# Patient Record
Sex: FEMALE | Race: WHITE | NOT HISPANIC OR LATINO | Employment: OTHER | URBAN - METROPOLITAN AREA
[De-identification: names, ages, dates, MRNs, and addresses within clinical notes are randomized per-mention and may not be internally consistent; named-entity substitution may affect disease eponyms.]

---

## 2023-09-07 LAB
ALANINE AMINOTRANSFERASE (SGPT) (U/L) IN SER/PLAS: 16 U/L (ref 7–45)
ALBUMIN (G/DL) IN SER/PLAS: 4.3 G/DL (ref 3.4–5)
ALKALINE PHOSPHATASE (U/L) IN SER/PLAS: 71 U/L (ref 33–136)
ANION GAP IN SER/PLAS: 12 MMOL/L (ref 10–20)
ASPARTATE AMINOTRANSFERASE (SGOT) (U/L) IN SER/PLAS: 18 U/L (ref 9–39)
BASOPHILS (10*3/UL) IN BLOOD BY AUTOMATED COUNT: 0.04 X10E9/L (ref 0–0.1)
BASOPHILS/100 LEUKOCYTES IN BLOOD BY AUTOMATED COUNT: 0.5 % (ref 0–2)
BILIRUBIN TOTAL (MG/DL) IN SER/PLAS: 1.3 MG/DL (ref 0–1.2)
CALCIUM (MG/DL) IN SER/PLAS: 9.3 MG/DL (ref 8.6–10.6)
CARBON DIOXIDE, TOTAL (MMOL/L) IN SER/PLAS: 28 MMOL/L (ref 21–32)
CHLORIDE (MMOL/L) IN SER/PLAS: 104 MMOL/L (ref 98–107)
COBALAMIN (VITAMIN B12) (PG/ML) IN SER/PLAS: 291 PG/ML (ref 211–911)
CREATININE (MG/DL) IN SER/PLAS: 0.78 MG/DL (ref 0.5–1.05)
EOSINOPHILS (10*3/UL) IN BLOOD BY AUTOMATED COUNT: 0.02 X10E9/L (ref 0–0.7)
EOSINOPHILS/100 LEUKOCYTES IN BLOOD BY AUTOMATED COUNT: 0.3 % (ref 0–6)
ERYTHROCYTE DISTRIBUTION WIDTH (RATIO) BY AUTOMATED COUNT: 14.4 % (ref 11.5–14.5)
ERYTHROCYTE MEAN CORPUSCULAR HEMOGLOBIN CONCENTRATION (G/DL) BY AUTOMATED: 30.5 G/DL (ref 32–36)
ERYTHROCYTE MEAN CORPUSCULAR VOLUME (FL) BY AUTOMATED COUNT: 98 FL (ref 80–100)
ERYTHROCYTES (10*6/UL) IN BLOOD BY AUTOMATED COUNT: 4.01 X10E12/L (ref 4–5.2)
GFR FEMALE: 81 ML/MIN/1.73M2
GLUCOSE (MG/DL) IN SER/PLAS: 105 MG/DL (ref 74–99)
HEMATOCRIT (%) IN BLOOD BY AUTOMATED COUNT: 39.4 % (ref 36–46)
HEMOGLOBIN (G/DL) IN BLOOD: 12 G/DL (ref 12–16)
IMMATURE GRANULOCYTES/100 LEUKOCYTES IN BLOOD BY AUTOMATED COUNT: 0.4 % (ref 0–0.9)
LEUKOCYTES (10*3/UL) IN BLOOD BY AUTOMATED COUNT: 7.5 X10E9/L (ref 4.4–11.3)
LYMPHOCYTES (10*3/UL) IN BLOOD BY AUTOMATED COUNT: 0.75 X10E9/L (ref 1.2–4.8)
LYMPHOCYTES/100 LEUKOCYTES IN BLOOD BY AUTOMATED COUNT: 9.9 % (ref 13–44)
MONOCYTES (10*3/UL) IN BLOOD BY AUTOMATED COUNT: 0.26 X10E9/L (ref 0.1–1)
MONOCYTES/100 LEUKOCYTES IN BLOOD BY AUTOMATED COUNT: 3.4 % (ref 2–10)
NEUTROPHILS (10*3/UL) IN BLOOD BY AUTOMATED COUNT: 6.44 X10E9/L (ref 1.2–7.7)
NEUTROPHILS/100 LEUKOCYTES IN BLOOD BY AUTOMATED COUNT: 85.5 % (ref 40–80)
NRBC (PER 100 WBCS) BY AUTOMATED COUNT: 0 /100 WBC (ref 0–0)
PLATELETS (10*3/UL) IN BLOOD AUTOMATED COUNT: 333 X10E9/L (ref 150–450)
POTASSIUM (MMOL/L) IN SER/PLAS: 3.7 MMOL/L (ref 3.5–5.3)
PROTEIN TOTAL: 6.9 G/DL (ref 6.4–8.2)
SODIUM (MMOL/L) IN SER/PLAS: 140 MMOL/L (ref 136–145)
UREA NITROGEN (MG/DL) IN SER/PLAS: 12 MG/DL (ref 6–23)

## 2023-09-12 LAB — METHYLMALONIC ACID, S: 0.13 UMOL/L (ref 0–0.4)

## 2023-10-10 ENCOUNTER — TELEPHONE (OUTPATIENT)
Dept: NEUROLOGY | Facility: HOSPITAL | Age: 68
End: 2023-10-10

## 2023-10-10 NOTE — TELEPHONE ENCOUNTER
Pt called stating that she had her last infusion on 9/19 at home.  Pt did not receive her IVIG meds from [facility] which was supposed to be at home last week.

## 2023-10-15 PROBLEM — I63.9 STROKE (MULTI): Status: ACTIVE | Noted: 2023-10-15

## 2023-10-15 PROBLEM — G70.00 MYASTHENIA (MULTI): Status: ACTIVE | Noted: 2023-10-15

## 2023-10-15 PROBLEM — I72.9 ANEURYSM (CMS-HCC): Status: ACTIVE | Noted: 2023-10-15

## 2023-10-15 PROBLEM — K21.9 ACID REFLUX: Status: ACTIVE | Noted: 2023-10-15

## 2023-10-15 PROBLEM — H53.2 DOUBLE VISION: Status: ACTIVE | Noted: 2023-10-15

## 2023-10-15 PROBLEM — R25.2 MUSCLE CRAMPS: Status: ACTIVE | Noted: 2023-10-15

## 2023-10-15 PROBLEM — G70.01: Status: ACTIVE | Noted: 2023-10-15

## 2023-10-15 PROBLEM — R53.83 FATIGUE: Status: ACTIVE | Noted: 2023-10-15

## 2023-10-15 PROBLEM — F41.9 ANXIETY: Status: ACTIVE | Noted: 2023-10-15

## 2023-10-15 PROBLEM — G47.00 INSOMNIA: Status: ACTIVE | Noted: 2023-10-15

## 2023-10-15 PROBLEM — M62.81 MUSCLE WEAKNESS: Status: ACTIVE | Noted: 2023-10-15

## 2023-10-15 RX ORDER — PYRIDOSTIGMINE BROMIDE 180 MG/1
1 TABLET, EXTENDED RELEASE ORAL NIGHTLY
COMMUNITY
End: 2024-06-03 | Stop reason: SDUPTHER

## 2023-10-15 RX ORDER — PANTOPRAZOLE SODIUM 40 MG/1
1 TABLET, DELAYED RELEASE ORAL DAILY
Status: ON HOLD | COMMUNITY
Start: 2022-01-19

## 2023-10-15 RX ORDER — PREDNISONE 5 MG/1
3 TABLET ORAL DAILY
COMMUNITY
Start: 2023-09-28 | End: 2023-12-27 | Stop reason: SDUPTHER

## 2023-10-15 RX ORDER — DIPHENHYDRAMINE HYDROCHLORIDE 50 MG/ML
50 INJECTION INTRAMUSCULAR; INTRAVENOUS
Status: ON HOLD | COMMUNITY
Start: 2023-01-09

## 2023-10-15 RX ORDER — SODIUM CHLORIDE 9 MG/ML
INJECTION, SOLUTION INTRAVENOUS
Status: ON HOLD | COMMUNITY
Start: 2023-03-30

## 2023-10-15 RX ORDER — UBIDECARENONE 75 MG
1 CAPSULE ORAL DAILY
Status: ON HOLD | COMMUNITY
Start: 2021-01-26

## 2023-10-15 RX ORDER — FOLIC ACID 1 MG/1
1 TABLET ORAL DAILY
COMMUNITY
Start: 2023-09-28 | End: 2023-11-08 | Stop reason: SDUPTHER

## 2023-10-15 RX ORDER — HUMAN IMMUNOGLOBULIN G 0.2 G/ML
LIQUID SUBCUTANEOUS
COMMUNITY
Start: 2023-06-27 | End: 2023-10-17

## 2023-10-15 RX ORDER — CEPHALEXIN 500 MG/1
1 CAPSULE ORAL 3 TIMES DAILY
Status: ON HOLD | COMMUNITY
Start: 2023-07-06

## 2023-10-15 RX ORDER — FERROUS SULFATE 325(65) MG
1 TABLET ORAL DAILY
COMMUNITY
Start: 2021-01-26 | End: 2024-03-18 | Stop reason: WASHOUT

## 2023-10-15 RX ORDER — PREDNISONE 10 MG/1
1 TABLET ORAL DAILY
Status: ON HOLD | COMMUNITY

## 2023-10-15 RX ORDER — METHOTREXATE 2.5 MG/1
5 TABLET ORAL
COMMUNITY
Start: 2023-10-02 | End: 2023-10-17 | Stop reason: SDUPTHER

## 2023-10-15 RX ORDER — FLUCONAZOLE 100 MG/1
TABLET ORAL
Status: ON HOLD | COMMUNITY
Start: 2022-11-15

## 2023-10-15 RX ORDER — CYCLOSPORINE 25 MG/1
1 CAPSULE, GELATIN COATED ORAL 2 TIMES DAILY
COMMUNITY
Start: 2023-06-05 | End: 2023-10-17

## 2023-10-15 RX ORDER — EPINEPHRINE 1 MG/ML
1 INJECTION INTRAMUSCULAR; INTRAVENOUS; SUBCUTANEOUS
Status: ON HOLD | COMMUNITY
Start: 2023-01-09

## 2023-10-15 RX ORDER — GABAPENTIN 100 MG/1
CAPSULE ORAL
COMMUNITY
Start: 2023-09-22 | End: 2023-12-27 | Stop reason: SDUPTHER

## 2023-10-15 RX ORDER — MUPIROCIN 20 MG/G
1 OINTMENT TOPICAL
Status: ON HOLD | COMMUNITY
Start: 2023-07-06

## 2023-10-15 RX ORDER — CYCLOSPORINE 50 MG/1
1 CAPSULE, LIQUID FILLED ORAL 2 TIMES DAILY
COMMUNITY
Start: 2023-05-18 | End: 2023-10-17

## 2023-10-15 RX ORDER — PYRIDOSTIGMINE BROMIDE 60 MG/1
1 TABLET ORAL 4 TIMES DAILY
Status: ON HOLD | COMMUNITY

## 2023-10-15 RX ORDER — DULOXETIN HYDROCHLORIDE 60 MG/1
1 CAPSULE, DELAYED RELEASE ORAL DAILY
Status: ON HOLD | COMMUNITY

## 2023-10-17 ENCOUNTER — OFFICE VISIT (OUTPATIENT)
Dept: NEUROLOGY | Facility: HOSPITAL | Age: 68
End: 2023-10-17
Payer: COMMERCIAL

## 2023-10-17 VITALS
DIASTOLIC BLOOD PRESSURE: 78 MMHG | TEMPERATURE: 96.8 F | HEART RATE: 69 BPM | RESPIRATION RATE: 18 BRPM | BODY MASS INDEX: 24.27 KG/M2 | SYSTOLIC BLOOD PRESSURE: 130 MMHG | HEIGHT: 63 IN | WEIGHT: 137 LBS

## 2023-10-17 DIAGNOSIS — G70.00 MYASTHENIA GRAVIS (MULTI): Primary | ICD-10-CM

## 2023-10-17 PROCEDURE — 99213 OFFICE O/P EST LOW 20 MIN: CPT | Performed by: PSYCHIATRY & NEUROLOGY

## 2023-10-17 PROCEDURE — 1036F TOBACCO NON-USER: CPT | Performed by: PSYCHIATRY & NEUROLOGY

## 2023-10-17 PROCEDURE — 1126F AMNT PAIN NOTED NONE PRSNT: CPT | Performed by: PSYCHIATRY & NEUROLOGY

## 2023-10-17 PROCEDURE — 99213 OFFICE O/P EST LOW 20 MIN: CPT | Mod: GC | Performed by: PSYCHIATRY & NEUROLOGY

## 2023-10-17 RX ORDER — METHOTREXATE 2.5 MG/1
17.5 TABLET ORAL
Qty: 175 TABLET | Refills: 0 | Status: SHIPPED | OUTPATIENT
Start: 2023-10-17 | End: 2024-03-18 | Stop reason: SDUPTHER

## 2023-10-17 ASSESSMENT — ENCOUNTER SYMPTOMS
OCCASIONAL FEELINGS OF UNSTEADINESS: 1
LOSS OF SENSATION IN FEET: 1

## 2023-10-17 ASSESSMENT — PAIN SCALES - GENERAL: PAINLEVEL: 0-NO PAIN

## 2023-10-17 NOTE — PATIENT INSTRUCTIONS
Great to see you in clinic today!  We'll plan to continue the IVIG every 3 weeks and see how you do.   For now, we'll increase the methotrexate to 17.5mg every week.   Keep supplementing with B12 daily and folate daily.   Next visit, we will talk about going down on the prednisone. For now, keep at 15mg daily.     Please give us a call if you need any refills.

## 2023-10-17 NOTE — PROGRESS NOTES
Date of Service: 10/17/2023  Patient: Page Huston  MRN: [id number]  Primary Care Physician: Annamarie Dickinson MD     History of Present Illness:    Ms. PAGE HUSTON is a 68 year woman who presents to neuromuscular clinic for follow-up of her seronegative myasthenia gravis (negative for both AChRab and MuskAb), diagnosed ~2017 on single fiber EMG. CT chest negative for thymoma.     She was and has been in a state of mild exacerbation of her MG for several months, with the following symptoms: generalized fatigue, dyspnea on exertion, frequent bothersome diplopia, mildly worsening dysphagia. While she had benefitted from immunoglobulin treatment in the past, she missed several treatments since June/July 2023 due to high out-of-pocket copay costs with home IVIG, and even higher costs when switched to subcutaneous Ig.     She was last seen in mid-Sept 2023.   Interval History:   Interval history:  -Overall, the co-pay issue has been resolved, she received a madison from [employer], which will help to cover her co-pay through next year.  - She was able to get one-time treatment of IVIG before her trip to [place] in late September 2023.  - There was some delay with receiving her IVIG delivered to her home, so her most recent home IVIG infusion through her port was on 10/12 and 10/13  -Her trip to [place] to visit her son went fairly well, but she does not tolerate warm weather well, and experienced frequent cramping every upper extremities and lower extremities, especially in her hands when she tries to use them.  -She notes continued double vision, blurry vision, symptoms seem to be worse at night, makes driving particularly difficult.  Saw her eye doctor who essentially told her her exam was normal at the time of examination.  -Some trouble with swallowing, food gets briefly stuck.  Had an episode with choking related to eating corn.  Also notes some weakness in her neck, more tasking with keeping her neck up  through the day.  Has noticed her voice sounds more hoarse.  -She continues to take prednisone 15 mg daily  -She continues the methotrexate 15 mg once a week, and reports she is supplementing with B12 and folate  -She reports taking Mestinon every 3-4 hours, about 4 tablets daily, really does feel it works and helps with an achiness and fatigue that she feels in her muscles, particularly when she is due for her next dose.    Allergies   Allergen Reactions    Prednisolone Unknown        Medications:    Current Outpatient Medications:     0.9 % sodium chloride (sodium chloride 0.9%) solution, As directed., Disp: , Rfl:     Adrenalin 1 mg/mL (1 mL) injection, 1 mL (1 mg). As directed., Disp: , Rfl:     cephalexin (Keflex) 500 mg capsule, Take 1 capsule (500 mg) by mouth 3 times a day., Disp: , Rfl:     cyanocobalamin (Vitamin B-12) 500 mcg tablet, Take 1 tablet (500 mcg) by mouth once daily., Disp: , Rfl:     diphenhydrAMINE (BENADryl) 50 mg/mL injection, 1 mL (50 mg). As directed., Disp: , Rfl:     DULoxetine (Cymbalta) 60 mg DR capsule, Take 1 capsule (60 mg) by mouth once daily., Disp: , Rfl:     ferrous sulfate 325 (65 Fe) MG tablet, Take 1 tablet (325 mg) by mouth once daily. With food, Disp: , Rfl:     fluconazole (Diflucan) 100 mg tablet, Take 2 tablets by mouth today and then 1 tablet every morninig for 14 days, Disp: , Rfl:     folic acid (Folvite) 1 mg tablet, Take 1 tablet (1 mg) by mouth once daily., Disp: , Rfl:     gabapentin (Neurontin) 100 mg capsule, TAKE ONE CAPSULE BY MOUTH AT BEDTIME FOR 3 DAYS THEN INCREASE TO 2 CAPSULES AT BEDTIME FOR 3 DAYS AND THEN INCREASE TO 3 CAPSULES AT BEDTIME, Disp: , Rfl:     immune globulin, human, (Gammagard Liquid 10%) infusion, INFUSE 60 GM DIVIDED OVER 2 DAYS EVERY 3 WEEKS VIA IMPLANTED PORT. PREMEDICATE WITH 650 MG ACETAMINOPHEN PO, 25 MG DIPHENHYDRAMINE PO,  MG HYDROCORTISONE IV PUSH. 1GM/KG. INFUSE OVER 3-4 HOURS, PATIENT CANNOT TOLERATE FASTER RATE.  "ADDITIONAL HYDRA, Disp: 600 mL, Rfl: 6    immune globulin, human, (Gammagard Liquid 10%) infusion, INFUSE 60 GM DIVIDED OVER 2 DAYS EVERY 3 WEEKS VIA IMPLANTED PORT. PATIENT CANNOT TOLERATE MORE THAN 20 GRAMS DAILY. PREMEDICATE WITH 650 MG ACETAMINOPHEN PO, 25 MG DIPHENHYDRAMINE PO,  MG HYDROCORTISONE IV PUSH. 1GM/KG. INFUSE OVER 3-4 HOURS, PAT, Disp: 600 mL, Rfl: 6    immune globulin, human, (Gammagard Liquid 10%) infusion, INFUSE 60 GM DIVIDED OVER 3 DAYS EVERY 3 WEEKS VIA IMPLANTED PORT. PATIENT CANNOT TOLERATE MORE THAN 20 GRAMS DAILY. PREMEDICATE WITH 650 MG ACETAMINOPHEN PO, 25 MG DIPHENHYDRAMINE PO,  MG HYDROCORTISONE IV PUSH. 1GM/KG. INFUSE OVER 3-4 HOURS, PAT, Disp: 600 mL, Rfl: 6    methotrexate (Trexall) 2.5 mg tablet, Take 7 tablets (17.5 mg total) by mouth 1 (one) time per week., Disp: 175 tablet, Rfl: 0    mupirocin (Bactroban) 2 % ointment, Apply 1 Application topically 3 times a day., Disp: , Rfl:     pantoprazole (ProtoNix) 40 mg EC tablet, Take 1 tablet (40 mg) by mouth once daily., Disp: , Rfl:     predniSONE (Deltasone) 10 mg tablet, Take 1 tablet (10 mg) by mouth once daily., Disp: , Rfl:     predniSONE (Deltasone) 5 mg tablet, Take 3 tablets (15 mg) by mouth once daily., Disp: , Rfl:     pyridostigmine (Mestinon) 180 mg ER tablet, Take 1 tablet (180 mg) by mouth once daily at bedtime., Disp: , Rfl:     pyridostigmine (Mestinon) 60 mg tablet, Take 1 tablet (60 mg) by mouth 4 times a day., Disp: , Rfl:     traZODone (Desyrel) 50 mg tablet, TAKE 1/2 TO 1 TABS AT BEDTIME, Disp: 30 tablet, Rfl: 1     Physical Exam:     /78   Pulse 69   Temp 36 °C (96.8 °F)   Resp 18   Ht 1.6 m (5' 3\")   Wt 62.1 kg (137 lb)   BMI 24.27 kg/m²     Brief Neurological Exam:  Voice sounds a little hoarser today compared to last visit.  Persistent left ptosis, stable compared to previous visits.  Extraocular movements are intact and full range, however does report horizontal diplopia at primary " "gaze, worsened with horizontal gaze.  Does better with puffed cheek test today than previously.  Tongue range of motion and strength is intact.  There was some fatigable weakness in her bilateral deltoids, though remaining motor exam was fairly normal.        Results:     The following labs, imaging, and results were personally reviewed and demonstrated:    Labs:  Lab Results   Component Value Date    HGBA1C 5.5 12/12/2021       Lab Results   Component Value Date    LVALLXDD23 291 09/07/2023     Lab Results   Component Value Date    IRON 18 (L) 05/07/2020    TIBC 223 (L) 05/07/2020    FERRITIN <8 (A) 05/07/2020     MAGNESIUM: No components found for: \"MAGNESIUM\"  No components found for: \"THRYOIDSTIMU\"  No results found for: \"NAINA\", \"ANATITER\"  No results found for: \"CKTOTAL\"  No results found for: \"SPEP\"  CBC:   Lab Results   Component Value Date    WBC 7.5 09/07/2023    HGB 12.0 09/07/2023    HCT 39.4 09/07/2023     09/07/2023     BMP:   Lab Results   Component Value Date     09/07/2023    K 3.7 09/07/2023     09/07/2023    CO2 28 09/07/2023    BUN 12 09/07/2023    CREATININE 0.78 09/07/2023    CALCIUM 9.3 09/07/2023    MG 2.13 10/09/2019    PHOS 3.5 12/14/2021     LFT:   Lab Results   Component Value Date    ALKPHOS 71 09/07/2023    BILITOT 1.3 (H) 09/07/2023    BILIDIR 0.1 05/04/2020    PROT 6.9 09/07/2023    ALBUMIN 4.3 09/07/2023    ALT 16 09/07/2023    AST 18 09/07/2023         Impression/Plan:     Problem List Items Addressed This Visit    None  Visit Diagnoses       Myasthenia gravis (CMS/HCC)    -  Primary    Relevant Medications    methotrexate (Trexall) 2.5 mg tablet          Impression:  Ms. AUTUMN BARNHART is a 68 year woman who presents to neuromuscular clinic for follow-up of her seronegative myasthenia gravis (negative for both AChRab and MuskAb), diagnosed ~2017 on single fiber EMG. CT chest negative for thymoma.     She was and has been in a state of mild exacerbation of her MG for " several months, with the following symptoms: generalized fatigue, dyspnea on exertion, frequent bothersome diplopia, mildly worsening dysphagia. While she had benefitted from immunoglobulin treatment in the past, she missed several treatments since June/July 2023 due to high out-of-pocket copay costs with home IVIG, and even higher costs when switched to subcutaneous Ig.     She is now back on track with her IVIG infusions at home through her port.  Though still fairly symptomatic, has only received 2 treatments thus far since resuming IVIG.    Plan:  - Continue home IVIG every 3 weeks, distributed over 2 days.  - Increase methotrexate from 15 mg to 17.5 mg every week (prescription updated today)  - Continue folate and B12 supplementation  - Continue Mestinon every 3-4 hours as needed  - Continue prednisone 15 mg daily, at next visit we will discuss decreasing dose if clinically stable or improved  - At next visit will see about rechecking B12 level  - We will monitor her dysphagia closely, she has declined to undergo a swallow evaluation with speech as this time    We will continue monitoring her to see if her symptoms improve with a few more consistent IVIG treatments.    She will call the office with any questions or concerns.      She has an appointment for follow up on 12/14 at 3:30PM.     Donta De Leon MD  Neuromuscular Fellow    The patient was seen, examined, and discussed with Dr. Shipley, Neuromuscular Attending.     -----------------------------------------------------------------------------------------------------------------------------  ATTENDING ATTESTATION    I saw patient with trainee and agree with the edits, history and exam that I helped formulate per above.    I personally spent 25 minutes on the day of the visit completing the review of the medical record and outside records, obtaining history and performing an appropriate physical exam, patient care, counseling and education, placing orders,  independently reviewing results, communicating with the patient/family and other providers, coordinating care and performing appropriate clinical documentation.    Elsy Shipley MD  Neuromuscular Neurology  [facility]  Office Phone Number: [phone]

## 2023-11-07 DIAGNOSIS — F41.9 ANXIETY: ICD-10-CM

## 2023-11-07 DIAGNOSIS — G47.00 INSOMNIA, UNSPECIFIED TYPE: ICD-10-CM

## 2023-11-08 ENCOUNTER — TELEPHONE (OUTPATIENT)
Dept: NEUROLOGY | Facility: HOSPITAL | Age: 68
End: 2023-11-08
Payer: COMMERCIAL

## 2023-11-08 DIAGNOSIS — G70.00 MYASTHENIA (MULTI): ICD-10-CM

## 2023-11-08 RX ORDER — FOLIC ACID 1 MG/1
1 TABLET ORAL DAILY
Qty: 90 TABLET | Refills: 3 | Status: ON HOLD | OUTPATIENT
Start: 2023-11-08 | End: 2024-11-07

## 2023-11-08 RX ORDER — TRAZODONE HYDROCHLORIDE 50 MG/1
TABLET ORAL
Qty: 30 TABLET | Refills: 11 | Status: ON HOLD | OUTPATIENT
Start: 2023-11-08 | End: 2024-11-07

## 2023-11-08 NOTE — TELEPHONE ENCOUNTER
----- Message from Rosales Álvarez RN sent at 11/8/2023 10:05 AM EST -----  Regarding: RE: Page Huston -  refills for Trazedone and Folic Acid  Contact: [phone]  Sent for your approval  ----- Message -----  From: Elsy Shipley MD  Sent: 11/8/2023   8:41 AM EST  To: Krissy Churchill; Rosales Álvarez RN  Subject: RE: Page Huston -  refills for Trazedone and #    [place],    Can you put in the folic acid as well?    I signed the trazadone.    Thanks!    Elsy  ----- Message -----  From: Krissy Churchill  Sent: 11/7/2023   1:16 PM EST  To: Elsy Shipley MD; Rosales Álvarez RN  Subject: Page Huston -  refills for Trazedone and Foli#    Patient just called in; needs refills on her Trazedone 50 mg -  1/2 to 1 at night; and folic acid 1 mg, taken once daily.  Pharmacy -  [facility].

## 2023-11-10 ENCOUNTER — APPOINTMENT (OUTPATIENT)
Dept: INFUSION THERAPY | Facility: CLINIC | Age: 68
End: 2023-11-10
Payer: COMMERCIAL

## 2023-12-14 ENCOUNTER — APPOINTMENT (OUTPATIENT)
Dept: NEUROLOGY | Facility: CLINIC | Age: 68
End: 2023-12-14
Payer: COMMERCIAL

## 2023-12-27 ENCOUNTER — TELEPHONE (OUTPATIENT)
Dept: NEUROLOGY | Facility: HOSPITAL | Age: 68
End: 2023-12-27

## 2023-12-27 DIAGNOSIS — G70.00 MYASTHENIA GRAVIS (MULTI): ICD-10-CM

## 2023-12-27 DIAGNOSIS — R25.2 MUSCLE CRAMPS: ICD-10-CM

## 2023-12-27 RX ORDER — PREDNISONE 5 MG/1
15 TABLET ORAL DAILY
Qty: 270 TABLET | Refills: 3 | Status: ON HOLD | OUTPATIENT
Start: 2023-12-27 | End: 2024-12-26

## 2023-12-27 RX ORDER — GABAPENTIN 100 MG/1
300 CAPSULE ORAL NIGHTLY
Qty: 270 CAPSULE | Refills: 3 | Status: ON HOLD | OUTPATIENT
Start: 2023-12-27 | End: 2024-12-26

## 2023-12-27 NOTE — TELEPHONE ENCOUNTER
Pt of [facility] needs a refill on Gabapentin 100mg, Prednisone 5mg.  [facility] [phone].

## 2024-01-31 ENCOUNTER — OFFICE VISIT (OUTPATIENT)
Dept: NEUROLOGY | Facility: HOSPITAL | Age: 69
End: 2024-01-31
Payer: COMMERCIAL

## 2024-01-31 VITALS
WEIGHT: 132 LBS | RESPIRATION RATE: 18 BRPM | TEMPERATURE: 96.6 F | DIASTOLIC BLOOD PRESSURE: 90 MMHG | HEIGHT: 63 IN | BODY MASS INDEX: 23.39 KG/M2 | SYSTOLIC BLOOD PRESSURE: 143 MMHG | HEART RATE: 73 BPM

## 2024-01-31 DIAGNOSIS — G70.00 MYASTHENIA GRAVIS (MULTI): ICD-10-CM

## 2024-01-31 DIAGNOSIS — E53.8 B12 DEFICIENCY: ICD-10-CM

## 2024-01-31 PROCEDURE — 1126F AMNT PAIN NOTED NONE PRSNT: CPT | Performed by: PSYCHIATRY & NEUROLOGY

## 2024-01-31 PROCEDURE — 1159F MED LIST DOCD IN RCRD: CPT | Performed by: PSYCHIATRY & NEUROLOGY

## 2024-01-31 PROCEDURE — 99213 OFFICE O/P EST LOW 20 MIN: CPT | Mod: GC | Performed by: PSYCHIATRY & NEUROLOGY

## 2024-01-31 PROCEDURE — 99213 OFFICE O/P EST LOW 20 MIN: CPT | Performed by: PSYCHIATRY & NEUROLOGY

## 2024-01-31 PROCEDURE — 1036F TOBACCO NON-USER: CPT | Performed by: PSYCHIATRY & NEUROLOGY

## 2024-01-31 ASSESSMENT — PAIN SCALES - GENERAL: PAINLEVEL: 0-NO PAIN

## 2024-01-31 NOTE — PROGRESS NOTES
Date of Service: 1/31/2024  Patient: Page Huston  MRN: [id number]  Referring Provider: No ref. provider found  Primary Care Physician: Annamarie Dickinson MD     History of Present Illness:    Ms. Huston is a 68 y.o. female who presents for evaluation of seronegative myasthenia gravis.     She continues to have blurred vision, even with wearing glasses. Hands are cramping. She is getting IVIG tomorrow. She does feel her symptoms will get better after IVIG. Despite getting IVIG, she continues to have double vision. She does have aching in her arms.     She is currently taking prednisone 10 mg, she lowered it from 15 sometimes last month. She has been open about her concern for weight gain and history of eating disorder, anorexia. Being on prednisone causes her a lot of worry with regards to weight gain. Thus, she cannot tolerate being on a high dose.     She also takes methotrexate 17.5 mg daily, Mestinon every 3.5 to 4 hours. Takes 180 ER Mestinon at night time only.    She does feel very symptomatic last week prior to her IVIG.    Frustrated.        Myasthenia Gravis History:    MG Type: Generalized, seronegative  Onset Date: 2008 or 2009  Onset Symptoms: intermittent generalized weakness, fatigability, intermittent double vision.  Immunosuppressant Medications: methotrexate currently, IVIG, prednisone 10 mg daily  Pyridostigmine: 60 mg every 3.5 to 4 hours; takes Mestinon 180 ER at night time only.  IVIG: yes  MG Crises: Yes  MG Exacerbations: Yes  Hospitalizations for MG: Yes  CT Scan: history of negative CT Chest  Comorbidities including malignancy and diabetes history: history of anorexia  Single Fiber (): No  Rep Stim ():  No  Labs: routine labs done  Failed Medications: azathioprine  Current symptoms: double vision, fatigue, extremity weakness     Ptosis: No  Diplopia: Yes  Hypernasal Speech: No  Dysarthria: No  Hoarseness: Mild  Hypophonia: No  Chewing Weakness/Fatigue: No  Dysphagia/choking: No  Arm  weakness: Aching, weakness is there, needs help with cutting foods at times  Leg Weakness: Tired as well, at night time  Dyspnea: On exertion.  Head drop/neck soreness: No  Tongue weakness: No      Review of Systems:  The systems were reviewed with pertinent positives and negatives documented in the HPI.     Problems Assessed/Relevant:  Problem List Items Addressed This Visit    None    No past medical history on file.  Past Surgical History:   Procedure Laterality Date    CT HEAD ANGIO W AND WO IV CONTRAST  3/17/2023    CT HEAD ANGIO W AND WO IV CONTRAST POR [id number] CT    HYSTERECTOMY  07/26/2017    Hysterectomy    IR CVC TUNNELED  5/5/2020    IR CVC TUNNELED 5/5/2020 [facility]GAC[facility]    MR HEAD ANGIO WO IV CONTRAST  2/16/2023    MR HEAD ANGIO WO IV CONTRAST POR CALLIE MR MOBILE    MR NECK ANGIO WO IV CONTRAST  2/16/2023    MR NECK ANGIO WO IV CONTRAST POR CALLIE LEVINE MOBILE     Family History   Problem Relation Name Age of Onset    Cancer Mother      Cancer Father      Cancer Sister      Cancer Brother       Social History     Tobacco Use    Smoking status: Never    Smokeless tobacco: Never   Substance Use Topics    Alcohol use: Yes     Comment: socially      Allergies   Allergen Reactions    Prednisolone Unknown        Medications:    Current Outpatient Medications:     0.9 % sodium chloride (sodium chloride 0.9%) solution, As directed., Disp: , Rfl:     Adrenalin 1 mg/mL (1 mL) injection, 1 mL (1 mg). As directed., Disp: , Rfl:     cephalexin (Keflex) 500 mg capsule, Take 1 capsule (500 mg) by mouth 3 times a day., Disp: , Rfl:     cyanocobalamin (Vitamin B-12) 500 mcg tablet, Take 1 tablet (500 mcg) by mouth once daily., Disp: , Rfl:     diphenhydrAMINE (BENADryl) 50 mg/mL injection, 1 mL (50 mg). As directed., Disp: , Rfl:     DULoxetine (Cymbalta) 60 mg DR capsule, Take 1 capsule (60 mg) by mouth once daily., Disp: , Rfl:     fluconazole (Diflucan) 100 mg tablet, Take 2 tablets by mouth today and then 1 tablet  every morninig for 14 days, Disp: , Rfl:     folic acid (Folvite) 1 mg tablet, Take 1 tablet (1 mg) by mouth once daily., Disp: 90 tablet, Rfl: 3    gabapentin (Neurontin) 100 mg capsule, Take 3 capsules (300 mg) by mouth once daily at bedtime., Disp: 270 capsule, Rfl: 3    immune globulin, human, (Gammagard Liquid 10%) infusion, INFUSE 60 GM DIVIDED OVER 2 DAYS EVERY 3 WEEKS VIA IMPLANTED PORT. PREMEDICATE WITH 650 MG ACETAMINOPHEN PO, 25 MG DIPHENHYDRAMINE PO,  MG HYDROCORTISONE IV PUSH. 1GM/KG. INFUSE OVER 3-4 HOURS, PATIENT CANNOT TOLERATE FASTER RATE. ADDITIONAL HYDRA, Disp: 600 mL, Rfl: 6    immune globulin, human, (Gammagard Liquid 10%) infusion, INFUSE 60 GM DIVIDED OVER 2 DAYS EVERY 3 WEEKS VIA IMPLANTED PORT. PATIENT CANNOT TOLERATE MORE THAN 20 GRAMS DAILY. PREMEDICATE WITH 650 MG ACETAMINOPHEN PO, 25 MG DIPHENHYDRAMINE PO,  MG HYDROCORTISONE IV PUSH. 1GM/KG. INFUSE OVER 3-4 HOURS, PAT, Disp: 600 mL, Rfl: 6    immune globulin, human, (Gammagard Liquid 10%) infusion, INFUSE 60 GM DIVIDED OVER 3 DAYS EVERY 3 WEEKS VIA IMPLANTED PORT. PATIENT CANNOT TOLERATE MORE THAN 20 GRAMS DAILY. PREMEDICATE WITH 650 MG ACETAMINOPHEN PO, 25 MG DIPHENHYDRAMINE PO,  MG HYDROCORTISONE IV PUSH. 1GM/KG. INFUSE OVER 3-4 HOURS, PAT, Disp: 600 mL, Rfl: 6    methotrexate (Trexall) 2.5 mg tablet, Take 7 tablets (17.5 mg total) by mouth 1 (one) time per week., Disp: 175 tablet, Rfl: 0    mupirocin (Bactroban) 2 % ointment, Apply 1 Application topically 3 times a day., Disp: , Rfl:     pantoprazole (ProtoNix) 40 mg EC tablet, Take 1 tablet (40 mg) by mouth once daily., Disp: , Rfl:     predniSONE (Deltasone) 10 mg tablet, Take 1 tablet (10 mg) by mouth once daily., Disp: , Rfl:     predniSONE (Deltasone) 5 mg tablet, Take 3 tablets (15 mg) by mouth once daily., Disp: 270 tablet, Rfl: 3    pyridostigmine (Mestinon) 180 mg ER tablet, Take 1 tablet (180 mg) by mouth once daily at bedtime., Disp: , Rfl:      "pyridostigmine (Mestinon) 60 mg tablet, Take 1 tablet (60 mg) by mouth 4 times a day., Disp: , Rfl:     traZODone (Desyrel) 50 mg tablet, TAKE 1/2 TO 1 TABS AT BEDTIME, Disp: 30 tablet, Rfl: 11    ferrous sulfate 325 (65 Fe) MG tablet, Take 1 tablet (325 mg) by mouth once daily. With food, Disp: , Rfl:        Physical Exam:     General Physical Exam:  /90   Pulse 73   Temp 35.9 °C (96.6 °F)   Resp 18   Ht 1.6 m (5' 3\")   Wt 59.9 kg (132 lb)   BMI 23.38 kg/m²      She is not in any acute distress.    Musculoskeletal: No scoliosis, lordosis, kyphosis, pes cavus, or hammertoes     Neurological Exam:   Mental status reveals: alert and oriented to person, place, and date. Speech is intact to conversation. Fund of knowledge is normal.     CN: Double vision in all directions; smile symmetric, no dysarthria during visit, did have a mild hoarse voice, no slurred speech    NF 5  Arm and leg strength 5 distally and proximally.    Results:     The following labs, imaging, and results were personally reviewed and demonstrated:    Labs:  Lab Results   Component Value Date    HGBA1C 5.5 12/12/2021       Lab Results   Component Value Date    [id number] 291 09/07/2023     VITAMIN D: No components found for: \"D25OHT\"  COPPER: No results found for: \"COPPER\"  ZINC: No components found for: \"ZINCLEVEL\"  B6: [ ]  FOLIC ACID: No components found for: \"FOLATELEVEL\"  IRON STUDIES:   Lab Results   Component Value Date    IRON 18 (L) 05/07/2020    TIBC 223 (L) 05/07/2020    FERRITIN <8 (A) 05/07/2020     MAGNESIUM: No components found for: \"MAGNESIUM\"  No components found for: \"THRYOIDSTIMU\"  No results found for: \"NAINA\", \"ANATITER\"  No results found for: \"CKTOTAL\"  No results found for: \"SPEP\"  CBC:   Lab Results   Component Value Date    WBC 7.5 09/07/2023    HGB 12.0 09/07/2023    HCT 39.4 09/07/2023     09/07/2023     BMP:   Lab Results   Component Value Date     09/07/2023    K 3.7 09/07/2023     09/07/2023 "    CO2 28 09/07/2023    BUN 12 09/07/2023    CREATININE 0.78 09/07/2023    CALCIUM 9.3 09/07/2023    MG 2.13 10/09/2019    PHOS 3.5 12/14/2021     LFT:   Lab Results   Component Value Date    ALKPHOS 71 09/07/2023    BILITOT 1.3 (H) 09/07/2023    BILIDIR 0.1 05/04/2020    PROT 6.9 09/07/2023    ALBUMIN 4.3 09/07/2023    ALT 16 09/07/2023    AST 18 09/07/2023       Impression/Plan:     Impression:  Page Huston is a 68 y.o. who presents for follow-up of seronegative myasthenia gravis on prednisone 10 mg daily, IVIG every 3 weeks. Has not tolerated azathioprine in the past, on methotrexate still with significant symptoms of constant double vision, fatigue, extremity weakness and some hoarseness of her voice.     Cannot increase prednisone high as she cannot tolerate a higher dose; she has a history of anorexia and related to this is concern for weight increase and body image. She is currently on prednisone 10 mg daily and she will see if she can tolerate a slightly higher dose of 15 mg daily.    Plan:  Plan:  - Continue home IVIG every 3 weeks, distributed over 2 days, will increase to 1.5 grams/kg ~ 90 grams over two days as despite immunosuppressant, steroids, she continues to have a high burden of symptoms.   - continue methotrexate 17.5 mg every week (prescription updated today)  - Continue folate and B12 supplementation  - Continue Mestinon every 3-4 hours as needed  - Increase prednisone 15 mg daily if tolerated. She has difficulty with higher dose of prednisone--cannot tolerate medication at higher dose.  - At next visit will see about rechecking B12 level  - referral to [facility]    -Counseled: You have myasthenia gravis and are immuno-suppressed.  You should avoid all LIVE vaccines.  You can receive all non-LIVE vaccines.    -Follow-up in [ ] months. Call our office for any questions, any new symptoms or worsening symptoms. If you are experiencing new or worsening difficulty swallowing, speaking or  breathing, go the the nearest emergency room to be admitted to the hospital for rescue therapy for your myasthenia gravis.    No orders of the defined types were placed in this encounter.       Myasthenia Gravis MEDS TO AVOID:  You have Myasthenia gravis and below are the medications that should not be used (contraindicated).     1. Absolute contraindications (are life-threatening)   Curare    D-penicillamine   Botulinum toxin- Botox   Interferon alpha  *               Neuromuscular paralytic agents used in general anesthesia such as succinycholine, rocuronium, vecuronium, etc.  2. Contraindications (should be avoided)   Antibiotics-  o Aminoglycosides- Gentamycin, Kanamycin, Amikacin, Neomycin, Streptomycin,      Tobramycin, Netilmycin, Paromomycin, spectinomycin,      Vancomycin  o Macrolides- Azithromycin (Z-pack), Erythromycin, Clarithromycin      (Biaxin), Telithromycin   o Fluoroquinolones Ciprofloxacin (Cipro), Norfloxacin, Levofloxacin (Levaquin)      Anti-malarials- Chloroquine, hydroxychloroquine (Plaquinal)   Anti-Fungals- Voriconazole   Anti-arrhythmics- Quinidine, Procainamide, Etafenone, Peruvoside   Magnesium- Oral tablets, IV magnesium replacement.     3. Use with Caution- may exacerbate weakness in some myasthenics   Antihypertensives  o Calcium channel blockers- Verapamil, Nifedipine, Felodipine   o Beta blockers- Propanalol, Atenolol, Acebutolol, Practolol, Oxprenolol, Sotalol,   Nadolol, and Ophthalmic Timolol   Lithium      Reviewed and approved by TAMY RIVERA on 1/31/24 at 10:01 AM.    I personally spent 25 minutes on the day of the visit completing the review of the medical record and outside records, obtaining history and performing an appropriate physical exam, patient care, counseling and education, placing orders, independently reviewing results, communicating with the patient/family and other providers, coordinating care and performing appropriate clinical documentation.

## 2024-02-07 ENCOUNTER — TELEPHONE (OUTPATIENT)
Dept: GASTROENTEROLOGY | Facility: CLINIC | Age: 69
End: 2024-02-07
Payer: COMMERCIAL

## 2024-02-07 NOTE — TELEPHONE ENCOUNTER
----- Message from Toma Pierre RN sent at 2/7/2024  1:55 PM EST -----  Regarding: COLONSCOPY  PT IS OUT, SHE IS NOT A SCREENING, ANEMIC, AND SEEING A DOCTOR IN [place]. THANK YOU.

## 2024-02-15 DIAGNOSIS — G70.01: ICD-10-CM

## 2024-02-15 DIAGNOSIS — H53.2 DOUBLE VISION: ICD-10-CM

## 2024-03-18 ENCOUNTER — OFFICE VISIT (OUTPATIENT)
Dept: NEUROLOGY | Facility: HOSPITAL | Age: 69
End: 2024-03-18
Payer: COMMERCIAL

## 2024-03-18 VITALS
HEART RATE: 75 BPM | TEMPERATURE: 95.3 F | DIASTOLIC BLOOD PRESSURE: 95 MMHG | SYSTOLIC BLOOD PRESSURE: 155 MMHG | RESPIRATION RATE: 18 BRPM

## 2024-03-18 DIAGNOSIS — E61.1 IRON DEFICIENCY: ICD-10-CM

## 2024-03-18 DIAGNOSIS — R53.83 OTHER FATIGUE: ICD-10-CM

## 2024-03-18 DIAGNOSIS — G70.00 MYASTHENIA GRAVIS (MULTI): Primary | ICD-10-CM

## 2024-03-18 PROCEDURE — 1036F TOBACCO NON-USER: CPT | Performed by: PSYCHIATRY & NEUROLOGY

## 2024-03-18 PROCEDURE — 1126F AMNT PAIN NOTED NONE PRSNT: CPT | Performed by: PSYCHIATRY & NEUROLOGY

## 2024-03-18 PROCEDURE — 99214 OFFICE O/P EST MOD 30 MIN: CPT | Mod: GC | Performed by: PSYCHIATRY & NEUROLOGY

## 2024-03-18 PROCEDURE — 1159F MED LIST DOCD IN RCRD: CPT | Performed by: PSYCHIATRY & NEUROLOGY

## 2024-03-18 PROCEDURE — 99214 OFFICE O/P EST MOD 30 MIN: CPT | Performed by: PSYCHIATRY & NEUROLOGY

## 2024-03-18 RX ORDER — METHOTREXATE 2.5 MG/1
20 TABLET ORAL
Qty: 96 TABLET | Refills: 0 | Status: ON HOLD | OUTPATIENT
Start: 2024-03-18 | End: 2024-06-16

## 2024-03-18 ASSESSMENT — PAIN SCALES - GENERAL: PAINLEVEL: 0-NO PAIN

## 2024-03-18 NOTE — PROGRESS NOTES
Neuromuscular Office Visit - Follow up/Subsequent Encounter    Date of Service: 3/18/2024  Patient: Page Huston  MRN: [id number]  Referring Provider: No ref. provider found  Primary Care Physician: Annamarie Dickinson MD       Impression/Plan:   Impression:  Page Huston is a 69 y.o. who presents for follow-up of seronegative myasthenia gravis on prednisone 15 mg daily, IVIG 1.5g/kg every 3 weeks. Has not tolerated azathioprine in the past, on methotrexate 17.5mg weekdly still with significant symptoms of constant double vision, fatigue, extremity weakness and some hoarseness of her voice.      Cannot increase prednisone high as she cannot tolerate a higher dose; she has a history of anorexia and related to this is concern for weight increase and body image. She is currently on prednisone 15 mg daily and tolerating okay.      Iron is low and %sat is low. Slightly low Hemoglobin.     Plan:  - Continue home IVIG every 3 weeks, distributed over 2 days, stay at 1.5 grams/kg ~ 90 grams over two days as despite immunosuppressant, steroids, she continues to have a high burden of symptoms.   - increase methotrexate to 20.0 mg every week (prescription updated today)  - Continue folate and B12 supplementation  - Continue Mestinon every 3-4 hours as needed  - Continue prednisone 15 mg daily as tolerated. She has difficulty with higher dose of prednisone--cannot tolerate medication at higher dose.  - Will see Dr. Neil April 4th for appointment regarding her persistent diplopia.      - Lab work for MG and for further workup of her chronic fatigue: LRP4 ab testing, TSH, T4, Iron, TIBC, transferrin, ferritin  - Referral to heme for evaluation and management of iron deficiency anemia    -Counseled: You have myasthenia gravis and are immuno-suppressed.  You should avoid all LIVE vaccines.  You can receive all non-LIVE vaccines.     -Follow-up in 3 months. Call our office for any questions, any new symptoms or worsening symptoms. If  you are experiencing new or worsening difficulty swallowing, speaking or breathing, go the the nearest emergency room to be admitted to the hospital for rescue therapy for your myasthenia gravis.    Medications to consider in subsequent visits (discussed with patient today) - francis Martinez MD  Neuromuscular Fellow    The patient was seen, examined, and discussed with Dr. Elsy Shipley, Neuromuscular Attending.         Myasthenia Gravis MEDS TO AVOID:  You have Myasthenia gravis and below are the medications that should not be used (contraindicated).     1. Absolute contraindications (are life-threatening)             Curare              D-penicillamine             Botulinum toxin- Botox             Interferon alpha  *               Neuromuscular paralytic agents used in general anesthesia such as succinycholine, rocuronium, vecuronium, etc.  2. Contraindications (should be avoided)             Antibiotics-  o          Aminoglycosides- Gentamycin, Kanamycin, Amikacin, Neomycin, Streptomycin,      Tobramycin, Netilmycin, Paromomycin, spectinomycin,      Vancomycin  o          Macrolides- Azithromycin (Z-pack), Erythromycin, Clarithromycin      (Biaxin), Telithromycin   o          Fluoroquinolones Ciprofloxacin (Cipro), Norfloxacin, Levofloxacin (Levaquin)                Anti-malarials- Chloroquine, hydroxychloroquine (Plaquinal)             Anti-Fungals- Voriconazole             Anti-arrhythmics- Quinidine, Procainamide, Etafenone, Peruvoside             Magnesium- Oral tablets, IV magnesium replacement.     3. Use with Caution- may exacerbate weakness in some myasthenics             Antihypertensives  o          Calcium channel blockers- Verapamil, Nifedipine, Felodipine   o          Beta blockers- Propanalol, Atenolol, Acebutolol, Practolol, Oxprenolol, Sotalol,   Nadolol, and Ophthalmic Timolol             Lithium      Orders Placed This Encounter   Procedures    LRP4 Autoantibody; [place]; 1483  - Miscellaneous Test     LRP4 Autoantibody  [facility], Test ID: 1483  Serum separator or Red top tube   Spin down sample than refrigerate   Minium volume: 1 mL     Standing Status:   Future     Standing Expiration Date:   3/18/2025     Order Specific Question:   What is the name of the test you wish to perform?     Answer:   LRP4 Autoantibody     Order Specific Question:   Which lab do you wish to perform this test? (For assistance call Client Services at [phone])     Answer:   [facility]     Order Specific Question:   What is the reference lab test ID? (For assistance call Client Services at [phone])     Answer:   [id number]     Order Specific Question:   Release result to MyChart     Answer:   Immediate    TSH     Standing Status:   Future     Standing Expiration Date:   3/18/2025     Order Specific Question:   Release result to MyChart     Answer:   Immediate [1]    T4     Standing Status:   Future     Standing Expiration Date:   3/18/2025     Order Specific Question:   Release result to MyChart     Answer:   Immediate [1]    Iron and TIBC     Standing Status:   Future     Standing Expiration Date:   3/18/2025     Order Specific Question:   Release result to MyChart     Answer:   Immediate [1]    Ferritin     Standing Status:   Future     Standing Expiration Date:   3/18/2025     Order Specific Question:   Release result to MyChart     Answer:   Immediate [1]    Transferrin     Standing Status:   Future     Standing Expiration Date:   3/18/2025     Order Specific Question:   Release result to MyChart     Answer:   Immediate [1]    Referral to Hematology and Oncology     Standing Status:   Future     Standing Expiration Date:   9/18/2024     Referral Priority:   Routine     Referral Type:   SCC Consult     Referral Reason:   Specialty Services Required     Requested Specialty:   Hematology and Oncology     Number of Visits Requested:   1          History of Present Illness:    Ms. Huston is a 69 y.o. female who  "presents for evaluation of seronegative myasthenia gravis. She was last seen 1/31/2024.     Interval History:   She expresses frustration as diplopia and fatigue remain persistent.   She continues to work as a  and the double vision makes it difficult for her to see numbers accurately and she struggles to \"focus\" the images together.     Fatigue remains a constant issue and some days limits her ability to do the things she needs to, wanting to sleep as the time.     She notes occasional ptosis, fighting to keep her eyes open.     She notes some benefit after each IVIG infusion. Last infusion was 4 days ago, does get mild headache with the infusions.     MG related Medications currently taking:  - IVIG 1.5g/kg every 3 weeks  - prednisone 15mg daily  - Methotrexate 20.0 mg weekly, Mestinon every 3.5 to 4 hours. Takes 180 ER Mestinon at night time only.     Myasthenia Gravis History:    MG Type: Generalized, seronegative  Onset Date: 2008 or 2009  Onset Symptoms: intermittent generalized weakness, fatigability, intermittent double vision.  Immunosuppressant Medications: methotrexate currently, IVIG, prednisone 10 mg daily  Pyridostigmine: 60 mg every 3.5 to 4 hours; takes Mestinon 180 ER at night time only.  IVIG: yes  MG Crises: Yes  MG Exacerbations: Yes  Hospitalizations for MG: Yes  CT Scan: history of negative CT Chest  Comorbidities including malignancy and diabetes history: history of anorexia  Single Fiber (): No  Rep Stim ():  No  Labs: routine labs done  Failed Medications: azathioprine  Current symptoms: double vision, fatigue, extremity weakness     Ptosis: Yes  Diplopia: Yes  Hypernasal Speech: No  Dysarthria: No  Hoarseness: Mild  Hypophonia: No  Chewing Weakness/Fatigue: No  Dysphagia/choking: No  Arm weakness: Aching, weakness is there, needs help with cutting foods at times  Leg Weakness: Tired as well, at night time  Dyspnea: On exertion.  Head drop/neck soreness: No  Tongue weakness: No      "   Review of Systems:  The systems were reviewed with pertinent positives and negatives documented in the HPI.     Allergies   Allergen Reactions    Prednisolone Unknown        Medications:    Current Outpatient Medications:     0.9 % sodium chloride (sodium chloride 0.9%) solution, As directed., Disp: , Rfl:     Adrenalin 1 mg/mL (1 mL) injection, 1 mL (1 mg). As directed., Disp: , Rfl:     cephalexin (Keflex) 500 mg capsule, Take 1 capsule (500 mg) by mouth 3 times a day., Disp: , Rfl:     cyanocobalamin (Vitamin B-12) 500 mcg tablet, Take 1 tablet (500 mcg) by mouth once daily., Disp: , Rfl:     diphenhydrAMINE (BENADryl) 50 mg/mL injection, 1 mL (50 mg). As directed., Disp: , Rfl:     DULoxetine (Cymbalta) 60 mg DR capsule, Take 1 capsule (60 mg) by mouth once daily., Disp: , Rfl:     ferrous sulfate 325 (65 Fe) MG tablet, Take 1 tablet (325 mg) by mouth once daily. With food, Disp: , Rfl:     fluconazole (Diflucan) 100 mg tablet, Take 2 tablets by mouth today and then 1 tablet every morninig for 14 days, Disp: , Rfl:     folic acid (Folvite) 1 mg tablet, Take 1 tablet (1 mg) by mouth once daily., Disp: 90 tablet, Rfl: 3    gabapentin (Neurontin) 100 mg capsule, Take 3 capsules (300 mg) by mouth once daily at bedtime., Disp: 270 capsule, Rfl: 3    methotrexate (Trexall) 2.5 mg tablet, Take 8 tablets (20 mg total) by mouth 1 (one) time per week., Disp: 96 tablet, Rfl: 0    mupirocin (Bactroban) 2 % ointment, Apply 1 Application topically 3 times a day., Disp: , Rfl:     pantoprazole (ProtoNix) 40 mg EC tablet, Take 1 tablet (40 mg) by mouth once daily., Disp: , Rfl:     predniSONE (Deltasone) 10 mg tablet, Take 1 tablet (10 mg) by mouth once daily., Disp: , Rfl:     predniSONE (Deltasone) 5 mg tablet, Take 3 tablets (15 mg) by mouth once daily., Disp: 270 tablet, Rfl: 3    pyridostigmine (Mestinon) 180 mg ER tablet, Take 1 tablet (180 mg) by mouth once daily at bedtime., Disp: , Rfl:     pyridostigmine (Mestinon)  60 mg tablet, Take 1 tablet (60 mg) by mouth 4 times a day., Disp: , Rfl:     traZODone (Desyrel) 50 mg tablet, TAKE 1/2 TO 1 TABS AT BEDTIME, Disp: 30 tablet, Rfl: 11     Physical Exam:     BP (!) 155/95   Pulse 75   Temp 35.2 °C (95.3 °F)   Resp 18     Brief Neurological Exam:  Diplopia worse with lateral gaze, mild with primary position, upgaze and downgaze.   Fatigueable ptosis after 20-30sec.  Face is symmetric. Tongue range of motion normal. Mild hoarseness to voice, no dysarthria.   Some decreased strength in left shoulder abduction, bilateral hip flexion 4/5  NF 5/5  Cannot stand from seated position with arms crossed.     Results:     The following labs, imaging, and results were personally reviewed and demonstrated:    Labs:    IRON STUDIES:   Lab Results   Component Value Date    IRON 18 (L) 05/07/2020    TIBC 223 (L) 05/07/2020    FERRITIN <8 (A) 05/07/2020       CBC:   Lab Results   Component Value Date    WBC 7.5 09/07/2023    HGB 12.0 09/07/2023    HCT 39.4 09/07/2023     09/07/2023       -----------------------------------------------------------------------------------------------------------------------------  ATTENDING ATTESTATION    I saw patient with trainee and agree with the edits, history and exam that I helped formulate per above.    I personally spent 30 minutes on the day of the visit completing the review of the medical record and outside records, obtaining history and performing an appropriate physical exam, patient care, counseling and education, independently reviewing results, communicating with the patient, coordinating care and performing appropriate clinical documentation.    Elsy Shipley MD  Neuromuscular Neurology  [facility]  Office Phone Number: [phone]

## 2024-03-18 NOTE — PATIENT INSTRUCTIONS
We will plan to send for another rarer antibody test which can cause myasthenia gravis.  This antibody is called LRP4. We will give you a printed order to have this drawn right before your next IVIG; please give this to your infusion nurse prior to receiving your next IVIG infusion.    In addition, we will re-check your iron studies and thyroid function.     For now, we will get you to a therapeutic level of methotrexate at 20mg every week (8 tablets).     When you see Dr. Neil in April, our main question for him is if you have a fixed ocular alignment deficit that can be corrected by prisms, or if there is a fluctuation in your ocular muscle strength that prisms would not help with and if there are other options to help with your double vision.     We will send a referral to hematology for them to evaluate for iron deficiency anemia and best way to treat this. Anemia is a common cause of fatigue.

## 2024-04-03 NOTE — PROGRESS NOTES
Assessment and Plan    03/26/2023 CTA head, which I personally reviewed, shows no lesion.  02/16/2023 MRI brain without contrast & MRA head & neck, which I personally reviewed, show bihemispheric primarily posterior periventricular white matter FLAIR lesions that are presumed ischemic.    ~2017 single fiber EMG positive.    Lab Results   Component Value Date/Time    [id number] 291 09/07/2023 0146    [id number] 149 (L) 05/12/2020 0336    [id number] 193 (L) 05/07/2020 0603    METHYLACID 0.13 09/07/2023 0146    FOLATE 9.6 05/12/2020 0336    FOLATE 14.5 05/07/2020 0603      12/06/2024 folate wnl.  12/29/2020 ESR 16. CRP <0.4 mg/dL.  02/27/2019 acetylcholine receptor binding, blocking & modulating antibodies, MuSK ab negative.    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    She has esotropia worse in both lateral gazes. 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 Cranial nerve (CN) VI palsies are the main other differential diagnosis consideration. She does not have much to suggest thyroid eye disease on examination. We discussed possible imaging for other causes and management with prisms and possibly strabismus surgery.    Plan    Follow up in 2-3 months with stereo plates. (Dilated 4/4/2024)

## 2024-04-04 ENCOUNTER — OFFICE VISIT (OUTPATIENT)
Dept: OPHTHALMOLOGY | Facility: CLINIC | Age: 69
End: 2024-04-04
Payer: COMMERCIAL

## 2024-04-04 DIAGNOSIS — G70.01: ICD-10-CM

## 2024-04-04 DIAGNOSIS — H53.2 DOUBLE VISION: ICD-10-CM

## 2024-04-04 DIAGNOSIS — H50.00 ESOTROPIA: Primary | ICD-10-CM

## 2024-04-04 PROCEDURE — 92060 SENSORIMOTOR EXAMINATION: CPT | Performed by: PSYCHIATRY & NEUROLOGY

## 2024-04-04 PROCEDURE — 99205 OFFICE O/P NEW HI 60 MIN: CPT | Performed by: PSYCHIATRY & NEUROLOGY

## 2024-04-04 ASSESSMENT — ENCOUNTER SYMPTOMS
CARDIOVASCULAR NEGATIVE: 0
HEMATOLOGIC/LYMPHATIC NEGATIVE: 0
RESPIRATORY NEGATIVE: 0
PSYCHIATRIC NEGATIVE: 0
NEUROLOGICAL NEGATIVE: 0
EYES NEGATIVE: 0
ENDOCRINE NEGATIVE: 0
MUSCULOSKELETAL NEGATIVE: 0
CONSTITUTIONAL NEGATIVE: 0
GASTROINTESTINAL NEGATIVE: 0
ALLERGIC/IMMUNOLOGIC NEGATIVE: 0

## 2024-04-04 ASSESSMENT — TONOMETRY
OS_IOP_MMHG: 12
OD_IOP_MMHG: 12
IOP_METHOD: GOLDMANN APPLANATION

## 2024-04-04 ASSESSMENT — CUP TO DISC RATIO
OS_RATIO: 0.4
OD_RATIO: 0.4

## 2024-04-04 ASSESSMENT — SLIT LAMP EXAM - LIDS
COMMENTS: NORMAL
COMMENTS: NORMAL

## 2024-04-04 ASSESSMENT — EXTERNAL EXAM - RIGHT EYE: OD_EXAM: NORMAL

## 2024-04-04 ASSESSMENT — VISUAL ACUITY
OS_SC: 20/30
METHOD: SNELLEN - LINEAR
CORRECTION_TYPE: GLASSES
OD_SC: 20/50+1
OS_PH_CC: 20/20-1
OD_PH_CC: 20/30

## 2024-04-04 ASSESSMENT — EXTERNAL EXAM - LEFT EYE: OS_EXAM: NORMAL

## 2024-04-04 NOTE — LETTER
April 4, 2024     Nilson Canchola    Patient: Page Huston   YOB: 1955   Date of Visit: 4/4/2024     Dear Dr. Nilson Canchola:    I am writing to share my findings regarding our shared patient Page Huston from her visit with me on 4/4/2024.    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    The diplopia started about 8 years ago. She was diagnosed with diplopia.    She reports constant diplopia. She has seen ophthalmologist Dr. Nilson Canchola and neuromuscular neurologist Dr. Elsy Shipley with myasthenia gravis diagnosed. Treatment at her last visit with Dr. Shipley 3/18/2024 was prednisone, IVIG and methotrexate with intolerance of azathioprine in the past.     She reports offset is horizontal. Her last time without diplopia was about 8 months ago. She does have some ptosis moreso on the left. She has glasses prism that is not helpful enough. She can achieve single vision with them, but if she looks around, it takes her some time to adjust.  Last edited by Riaz Neil MD PhD on 4/4/2024  1:31 PM.        Diagnoses    Diagnoses and all orders for this visit:  Esotropia (Primary)  Myasthenia gravis with exacerbation, generalized (CMS/HCC)  -     Referral to Neurology  Double vision  -     Referral to Neurology    Assessment and Plan    03/26/2023 CTA head, which I personally reviewed, shows no lesion.  02/16/2023 MRI brain without contrast & MRA head & neck, which I personally reviewed, show bihemispheric primarily posterior periventricular white matter FLAIR lesions that are presumed ischemic.    ~2017 single fiber EMG positive.    Lab Results   Component Value Date/Time    [id number] 291 09/07/2023 0146    [id number] 149 (L) 05/12/2020 0336    HETVOYHP18 193 (L) 05/07/2020 0603    METHYLACID 0.13 09/07/2023 0146    FOLATE 9.6 05/12/2020 0336    FOLATE 14.5 05/07/2020 0603      12/06/2024 folate  wnl.  12/29/2020 ESR 16. CRP <0.4 mg/dL.  02/27/2019 acetylcholine receptor binding, blocking & modulating antibodies, MuSK ab negative.    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    She has esotropia worse in both lateral gazes. 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 Cranial nerve (CN) VI palsies are the main other differential diagnosis consideration. She does not have much to suggest thyroid eye disease on examination. We discussed possible imaging for other causes and management with prisms and possibly strabismus surgery.    Plan    Follow up in 2-3 months with stereo plates. (Dilated 4/4/2024)      Below you will find my full examination. I appreciate the opportunity to see Page Huston today and to share in her care with you. Please contact me if you have questions for me regarding this visit or if I can be of assistance to another of your patients with neuro-ophthalmological problems.    Sincerely,    Riaz Neil MD PhD    CC:   Elsy Shipley MD      Base Eye Exam       Visual Acuity (Snellen - Linear)         Right Left    Dist sc 20/50+1 20/30    Dist ph cc 20/30 20/20-1      Correction: Glasses              Tonometry (Goldmann Applanation, 1:12 PM)         Right Left    Pressure 12 12              Pupils         Dark Light Shape React APD    Right 5 3 Round Brisk None    Left 5 3 Round Brisk None              Extraocular Movement         Right Left     -- 0 --   0  0   -- 0 --    -- 0 --   0  -2   -- 0 --                 Neuro/Psych       Oriented x3: Yes              Dilation       Both eyes: 1% Mydriacyl & 2.5% Dmitry  @ 1:22 PM                  Additional Tests       Color         Right Left    Ishihara 11 11              Stereo       Fly: +    Animals: 0    Circles: 0                  Cranial Nerves       CN V         Right Left    Overall  Normal Normal              CN IX-X         Right Left    Overall Normal Normal              CN VII         Right Left    Overall Normal Normal              CN XI         Right Left    Overall Normal Normal              CN VIII         Right Left    Overall Normal Normal              CN XII         Right Left    Overall Normal Normal                  Strabismus Exam       Reading #1   (Edited by: Riaz Neil MD PhD)      Method: Bolwell distance without glasses      Distance Near Near +3DS N Bifocals                      - - 0 - -  ET 12 - - 0 - -                      ET 14 0  0  ET 9 0  -2  ET 18                     - - 0 - -  ET 9 - - 0 - -                           Reading #2   (Edited by: Riaz Neil MD PhD)      Method: Bolwell distance with prism glasses on, 9 PD MARC OU      Distance Near Near +3DS N Bifocals                      - - - - - -   - - - - - -                       - -  - -  Ortho  - -  - -                       - - - - - -   - - - - - -                               Slit Lamp and Fundus Exam       External Exam         Right Left    External Normal Normal              Slit Lamp Exam         Right Left    Lids/Lashes Normal Normal    Conjunctiva/Sclera Pinguecula Pinguecula    Cornea Clear Clear    Anterior Chamber Deep and quiet Deep and quiet    Iris Round and reactive Round and reactive    Lens 1+ Nuclear sclerosis, 2+ Cortical cataract 1+ Nuclear sclerosis, 1+ Cortical cataract    Anterior Vitreous Normal Normal              Fundus Exam         Right Left    Disc Normal Normal    C/D Ratio 0.4 0.4    Macula few drusen few drusen    Vessels Normal Normal    Periphery drusen along arcades & other vessels drusen along arcades & other vessels

## 2024-06-03 ENCOUNTER — OFFICE VISIT (OUTPATIENT)
Dept: NEUROLOGY | Facility: HOSPITAL | Age: 69
End: 2024-06-03
Payer: COMMERCIAL

## 2024-06-03 DIAGNOSIS — D50.9 IRON DEFICIENCY ANEMIA, UNSPECIFIED IRON DEFICIENCY ANEMIA TYPE: ICD-10-CM

## 2024-06-03 DIAGNOSIS — G70.00 MYASTHENIA GRAVIS (MULTI): Primary | ICD-10-CM

## 2024-06-03 PROCEDURE — 99214 OFFICE O/P EST MOD 30 MIN: CPT | Performed by: PSYCHIATRY & NEUROLOGY

## 2024-06-03 PROCEDURE — 99214 OFFICE O/P EST MOD 30 MIN: CPT | Mod: GC | Performed by: PSYCHIATRY & NEUROLOGY

## 2024-06-03 PROCEDURE — 1036F TOBACCO NON-USER: CPT | Performed by: PSYCHIATRY & NEUROLOGY

## 2024-06-03 RX ORDER — PYRIDOSTIGMINE BROMIDE 180 MG/1
180 TABLET, EXTENDED RELEASE ORAL NIGHTLY
Qty: 30 TABLET | Refills: 11 | Status: ON HOLD | OUTPATIENT
Start: 2024-06-03 | End: 2025-06-03

## 2024-06-03 NOTE — PROGRESS NOTES
Neuromuscular Office Visit - Follow up/Subsequent Encounter    Date of Service: 6/3/2024  Patient: Page Huston  MRN: [id number]  Referring Provider: No ref. provider found  Primary Care Physician: Annamarie Dickinson MD       Impression/Plan:   Page Huston is a 69 y.o. who presents for follow-up of seronegative myasthenia gravis on prednisone 10 mg daily, IVIG 1.5g/kg every 3 weeks. Has not tolerated azathioprine in the past, now on methotrexate 20 mg weekly with continued bothersome symptoms of constant double vision, daily fatigue, dyspnea with exertion, extremity weakness and some hoarseness of her voice affecting her quality of life.      Cannot increase prednisone; concern for weight gain, has a history of eating disorder. She recently decreased her prednisone to 10 mg daily.     Her prior iron studies showed low iron (Dec 2023), %sat (Dec 2023), low Hemoglobin 9.5 (May 2024).  She was recommended to undergo GI scope. She reports taking a daily iron oral supplementation. We wonder if iron infusions  might be a consideration as well as appropriate workup for causes of iron deficiency anemia - as they may be contributors to her fatigue and dyspnea with exertion. Fatigue and dyspnea with exertion can also be seen in myasthenia gravis.    Her diplopia, dysphagia are symptoms she is also currently experiencing.     She has failed high dose IVIG at 1.5 grams/kg every 3 weeks. We cannot increase prednisone due to side effects. She has not tolerated Imuran or cyclosporine in the past. Her myasthenia gravis has not been controlled with methotrexate which she is currently on and she is experiencing side effects of a change in taste and mouth pain with it.     Thus, she has tried 3 traditional immunosuppressants--Imuran, Cyclosporine and Methotrexate and either experienced severe side effects or on methotrexate currently--has not been controlled in terms of myasthenia gravis. The copay for Subcutaneous Ig was too high and  "not feasible.    Also not controlled on current overall regimen of IVIG 1.5 gram/kg every 3 weeks, methotrexate, and prednisone 10 mg daily, mestinon 60 mg QID and nightly Mestinon  mg. We cannot increase prednisone due to side effects as well.    Thus we are requesting approval for a medication that works in a different mechanism, a FcRn inhibitor, efgartigimod. There is clinical trial evidence supporting its use in seronegative myasthenia gravis patients. There is clinical trial data that supports it use in seronegative myasthenia gravis patients.     \" Art SINGH Jr, Merari V, Sushil T, Amadou C, Hima S, De Michelle JL, Patty H, Meiabeba A, Juaquin SR, Dana M, Qamar HERNANDEZ, [place] Bear B, Romy S, Keya FRANKLIN, Topher K, Stan J, Bety R; ADAPT+ Study Group. Long-term safety, tolerability, and efficacy of efgartigimod (ADAPT+): interim results from a phase 3 open-label extension study in participants with generalized myasthenia gravis. Front Neurol. 2024 Jan 17;14:6008242. doi: 10.3389/fneur.[id number]. PMID: 26790707; PMCID: [id number]. The major findings from this trial as it pertains to seronegative patients was that for seronegative patients, they had a similar significant reduction in MG-ADL score when compared to seropositive patients at a mean of week 3 in the cycle with receiving efgartigomid. A reduction in total IgG levels was also seen in seronegative patients.\"    Current MG ADL score: 11  MGFA Class IIIb    Plan:  - We will apply for insurance approval for Vyvgart; will obtain a quote on out of pocket costs  - She will continue methotrexate 20 mg weekly for now, prednisone 10mg, folate and B12 supplementation  - Referral to GI to screen for possible causes of her iron deficiency  - We will attempt to reach out to her PCP regarding appropriateness and possibility of Iron infusions for iron deficiency. Though, she is scheduled to see Hematology on 7/23/2024 regarding iron deficiency " anemia and was encouraged to make this appointment.   - Refilled mestinon ER today    - She is to stop by the lab for labs ordered since last visit: LRP4 ab testing, TSH, T4, Iron, TIBC, transferrin, ferritin     Donta De Leon MD  Neuromuscular Fellow    The patient was seen, examined, and discussed with  , Neuromuscular Attending.       Myasthenia Gravis MEDS TO AVOID:  You have Myasthenia gravis and below are the medications that should not be used (contraindicated).     1. Absolute contraindications (are life-threatening)             Curare              D-penicillamine             Botulinum toxin- Botox             Interferon alpha  *               Neuromuscular paralytic agents used in general anesthesia such as succinycholine, rocuronium, vecuronium, etc.  2. Contraindications (should be avoided)             Antibiotics-  o          Aminoglycosides- Gentamycin, Kanamycin, Amikacin, Neomycin, Streptomycin,      Tobramycin, Netilmycin, Paromomycin, spectinomycin,      Vancomycin  o          Macrolides- Azithromycin (Z-pack), Erythromycin, Clarithromycin      (Biaxin), Telithromycin   o          Fluoroquinolones Ciprofloxacin (Cipro), Norfloxacin, Levofloxacin (Levaquin)                Anti-malarials- Chloroquine, hydroxychloroquine (Plaquinal)             Anti-Fungals- Voriconazole             Anti-arrhythmics- Quinidine, Procainamide, Etafenone, Peruvoside             Magnesium- Oral tablets, IV magnesium replacement.     3. Use with Caution- may exacerbate weakness in some myasthenics             Antihypertensives  o          Calcium channel blockers- Verapamil, Nifedipine, Felodipine   o          Beta blockers- Propanalol, Atenolol, Acebutolol, Practolol, Oxprenolol, Sotalol,   Nadolol, and Ophthalmic Timolol             Lithium      History of Present Illness:    Ms. Huston is a 69 y.o. female who presents for evaluation of seronegative myasthenia gravis. She was last seen 3/18/2024.     Interval History:    Double vision remains a daily issue.  She wears corrective glasses.  She saw Dr. Riaz Neil in April 2024.    Fatigue remains a daily issue for her as well, and she finds himself often needing to take naps in the afternoon.  Her significant other tells her she has been harder to awaken from sleep than before.    She becomes short of breath with any increased levels of physical activity, brisk walking, working in the yard.     She continues working as a  difficult for her medical expenses, 5 days a week, 23 hours total /week.     She reports some acute back pain she has been dealing with, as well as a episode this past weekend of feeling disconnected/disoriented and mouth twisting/distorted in a strange position.  -------  She was unable to stop by the lab for the LRP 4, iron panel, and thyroid function studies ordered last visit.    Her most recent labs from [facility] home infusion in MAY 2024 showed hemoglobin of 9.5. Which is decreased from 10.5 from FEB 2024.   -------  Medications:  - Methotrexate, 20 mg weekly.  Recently she stopped the medication for 2 weeks as a trial and found that it improved her symptoms of sore/burning sensation mouth, as well as bitter taste/dysgeusia.  She has resumed it again recently.  - She continues the IVIG every 3 weeks  - She decreased her prednisone form 15mg to 10mg daily out of concern for weight gain  - She takes mestinon 3-4 times daily and takes 180 ER Mestinon at night time only.     Myasthenia Gravis History:    MG Type: Generalized, seronegative  Onset Date: 2008 or 2009  Onset Symptoms: intermittent generalized weakness, fatigability, intermittent double vision.  Immunosuppressant Medications: methotrexate currently, IVIG, prednisone 10 mg daily  Pyridostigmine: 60 mg every 3.5 to 4 hours; takes Mestinon 180 ER at night time only.  IVIG: yes  MG Crises: Yes  MG Exacerbations: Yes  Hospitalizations for MG: Yes  CT Scan: history of negative CT  Chest  Comorbidities including malignancy and diabetes history: history of anorexia  Single Fiber: No  Rep Stim:  No  Labs: routine labs done  Failed Medications: azathioprine, cyclosporine--both had side effects  Other medications: Copay for subcutaneous Ig high and thus not feasible to take, also during brief trial had infusion site skin changes  Current symptoms: double vision, fatigue, extremity weakness, dyspnea on exertion, dysphagia.     Ptosis: Mild, not today.  Diplopia: Yes  Hypernasal Speech: No  Dysarthria: No  Hoarseness: Mild  Hypophonia: No  Chewing Weakness/Fatigue: No  Dysphagia/choking: Some coughing with foods; sensation of something/globus sensation in her throat  Arm weakness: Aching, weakness is there, needs help with cutting foods at times due to hand cramps  Leg Weakness: Tired as well, at night time  Dyspnea: On exertion, with an increased level of physical activity (brisk walking, working in the yard  Head drop/neck soreness: No  Tongue weakness: No    SubQ immunoglobulin was tried in the past, however the co-pay was higher than IVIG, so she was switched back to IVIG.      Allergies   Allergen Reactions    Prednisolone Unknown      Medications:    Current Outpatient Medications:     0.9 % sodium chloride (sodium chloride 0.9%) solution, As directed., Disp: , Rfl:     Adrenalin 1 mg/mL (1 mL) injection, 1 mL (1 mg). As directed., Disp: , Rfl:     cephalexin (Keflex) 500 mg capsule, Take 1 capsule (500 mg) by mouth 3 times a day., Disp: , Rfl:     cyanocobalamin (Vitamin B-12) 500 mcg tablet, Take 1 tablet (500 mcg) by mouth once daily., Disp: , Rfl:     diphenhydrAMINE (BENADryl) 50 mg/mL injection, 1 mL (50 mg). As directed., Disp: , Rfl:     DULoxetine (Cymbalta) 60 mg DR capsule, Take 1 capsule (60 mg) by mouth once daily., Disp: , Rfl:     fluconazole (Diflucan) 100 mg tablet, Take 2 tablets by mouth today and then 1 tablet every morninig for 14 days, Disp: , Rfl:     folic acid  (Folvite) 1 mg tablet, Take 1 tablet (1 mg) by mouth once daily., Disp: 90 tablet, Rfl: 3    gabapentin (Neurontin) 100 mg capsule, Take 3 capsules (300 mg) by mouth once daily at bedtime., Disp: 270 capsule, Rfl: 3    methotrexate (Trexall) 2.5 mg tablet, Take 8 tablets (20 mg total) by mouth 1 (one) time per week., Disp: 96 tablet, Rfl: 0    mupirocin (Bactroban) 2 % ointment, Apply 1 Application topically 3 times a day., Disp: , Rfl:     pantoprazole (ProtoNix) 40 mg EC tablet, Take 1 tablet (40 mg) by mouth once daily., Disp: , Rfl:     predniSONE (Deltasone) 10 mg tablet, Take 1 tablet (10 mg) by mouth once daily., Disp: , Rfl:     predniSONE (Deltasone) 5 mg tablet, Take 3 tablets (15 mg) by mouth once daily., Disp: 270 tablet, Rfl: 3    pyridostigmine (Mestinon) 180 mg ER tablet, Take 1 tablet (180 mg) by mouth once daily at bedtime., Disp: , Rfl:     pyridostigmine (Mestinon) 60 mg tablet, Take 1 tablet (60 mg) by mouth 4 times a day., Disp: , Rfl:     traZODone (Desyrel) 50 mg tablet, TAKE 1/2 TO 1 TABS AT BEDTIME, Disp: 30 tablet, Rfl: 11     Physical Exam:     There were no vitals taken for this visit.    Brief Neurological Exam:  With corrective glasses off, horizontal diplopia is present with primary gaze, worsened by lateral gaze.     NO fatigable ptosis, Single breath count 25.     Neck flexion and extension were full strength.     Face is symmetric. Mild hoarseness to voice, no dysarthria.   Some decreased strength in left shoulder abduction which is stable compared to last visit.     Normal strength in bilateral lower extremities, though did develop cramping in her left thigh when testing knee flexion and extension.     Results:     The following labs, imaging, and results were personally reviewed and demonstrated:    Labs:  Lab Results   Component Value Date    HGBA1C 5.5 12/12/2021       Lab Results   Component Value Date    SBTSXIYR20 291 09/07/2023     IRON STUDIES:   Lab Results   Component  Value Date    IRON 18 (L) 05/07/2020    TIBC 223 (L) 05/07/2020    FERRITIN <8 (A) 05/07/2020     CBC:   Lab Results   Component Value Date    WBC 7.5 09/07/2023    HGB 12.0 09/07/2023    HCT 39.4 09/07/2023     09/07/2023     BMP:   Lab Results   Component Value Date     09/07/2023    K 3.7 09/07/2023     09/07/2023    CO2 28 09/07/2023    BUN 12 09/07/2023    CREATININE 0.78 09/07/2023    CALCIUM 9.3 09/07/2023    MG 2.13 10/09/2019    PHOS 3.5 12/14/2021     LFT:   Lab Results   Component Value Date    ALKPHOS 71 09/07/2023    BILITOT 1.3 (H) 09/07/2023    BILIDIR 0.1 05/04/2020    PROT 6.9 09/07/2023    ALBUMIN 4.3 09/07/2023    ALT 16 09/07/2023    AST 18 09/07/2023       -----------------------------------------------------------------------------------------------------------------------------  ATTENDING ATTESTATION    I saw patient with trainee and agree with the edits, history and exam that I helped formulate per above.    I personally spent 30 minutes on the day of the visit completing the review of the medical record and outside records, obtaining history and performing an appropriate physical exam, patient care, counseling and education, independently reviewing results, communicating with the patient, coordinating care.    Elsy Shipley MD  Neuromuscular Neurology  [facility]  Office Phone Number: [phone]

## 2024-06-03 NOTE — PATIENT INSTRUCTIONS
GI referral - evaluate for possible causes for iron deficiency.   2.   We will reach out to your PCP about Iron infusions.   3.   Please still go to your hematology appointment on 7/23.   4.   We will initiate process for Vyvgart and see if it can get approved and an estimate on cost.   5.   Please stop by the  lab when you get a chance.     I renewed the mestinon.     For the meantime, please continue the methotrexate until we hear back about the Vyvgart. And continued the prednisone 10mg daily.     We will follow up with you in September.

## 2024-06-05 ENCOUNTER — CLINICAL SUPPORT (OUTPATIENT)
Dept: EMERGENCY MEDICINE | Facility: HOSPITAL | Age: 69
End: 2024-06-05
Payer: COMMERCIAL

## 2024-06-05 ENCOUNTER — APPOINTMENT (OUTPATIENT)
Dept: OPHTHALMOLOGY | Facility: CLINIC | Age: 69
End: 2024-06-05
Payer: COMMERCIAL

## 2024-06-05 PROCEDURE — 36415 COLL VENOUS BLD VENIPUNCTURE: CPT | Performed by: EMERGENCY MEDICINE

## 2024-06-05 PROCEDURE — 83540 ASSAY OF IRON: CPT

## 2024-06-05 PROCEDURE — 80048 BASIC METABOLIC PNL TOTAL CA: CPT | Performed by: EMERGENCY MEDICINE

## 2024-06-05 PROCEDURE — 99285 EMERGENCY DEPT VISIT HI MDM: CPT

## 2024-06-05 PROCEDURE — 83880 ASSAY OF NATRIURETIC PEPTIDE: CPT | Performed by: EMERGENCY MEDICINE

## 2024-06-05 PROCEDURE — 84484 ASSAY OF TROPONIN QUANT: CPT | Performed by: EMERGENCY MEDICINE

## 2024-06-05 PROCEDURE — 82248 BILIRUBIN DIRECT: CPT

## 2024-06-05 PROCEDURE — 82728 ASSAY OF FERRITIN: CPT

## 2024-06-05 PROCEDURE — 80061 LIPID PANEL: CPT

## 2024-06-05 PROCEDURE — 93005 ELECTROCARDIOGRAM TRACING: CPT

## 2024-06-05 ASSESSMENT — COLUMBIA-SUICIDE SEVERITY RATING SCALE - C-SSRS
2. HAVE YOU ACTUALLY HAD ANY THOUGHTS OF KILLING YOURSELF?: NO
1. IN THE PAST MONTH, HAVE YOU WISHED YOU WERE DEAD OR WISHED YOU COULD GO TO SLEEP AND NOT WAKE UP?: NO
6. HAVE YOU EVER DONE ANYTHING, STARTED TO DO ANYTHING, OR PREPARED TO DO ANYTHING TO END YOUR LIFE?: NO

## 2024-06-05 ASSESSMENT — PAIN DESCRIPTION - LOCATION: LOCATION: CHEST

## 2024-06-05 ASSESSMENT — PAIN - FUNCTIONAL ASSESSMENT: PAIN_FUNCTIONAL_ASSESSMENT: 0-10

## 2024-06-05 ASSESSMENT — PAIN DESCRIPTION - FREQUENCY: FREQUENCY: INTERMITTENT

## 2024-06-05 ASSESSMENT — PAIN DESCRIPTION - DESCRIPTORS: DESCRIPTORS: SQUEEZING

## 2024-06-05 ASSESSMENT — PAIN SCALES - GENERAL: PAINLEVEL_OUTOF10: 7

## 2024-06-06 ENCOUNTER — APPOINTMENT (OUTPATIENT)
Dept: RADIOLOGY | Facility: HOSPITAL | Age: 69
End: 2024-06-06
Payer: COMMERCIAL

## 2024-06-06 ENCOUNTER — APPOINTMENT (OUTPATIENT)
Dept: CARDIOLOGY | Facility: HOSPITAL | Age: 69
End: 2024-06-06
Payer: COMMERCIAL

## 2024-06-06 ENCOUNTER — APPOINTMENT (OUTPATIENT)
Dept: OTHER | Facility: HOSPITAL | Age: 69
End: 2024-06-06
Payer: COMMERCIAL

## 2024-06-06 ENCOUNTER — HOSPITAL ENCOUNTER (INPATIENT)
Facility: HOSPITAL | Age: 69
End: 2024-06-06
Attending: STUDENT IN AN ORGANIZED HEALTH CARE EDUCATION/TRAINING PROGRAM | Admitting: STUDENT IN AN ORGANIZED HEALTH CARE EDUCATION/TRAINING PROGRAM
Payer: COMMERCIAL

## 2024-06-06 DIAGNOSIS — Z86.73 HISTORY OF TIA (TRANSIENT ISCHEMIC ATTACK): ICD-10-CM

## 2024-06-06 DIAGNOSIS — G70.01: ICD-10-CM

## 2024-06-06 DIAGNOSIS — E55.9 VITAMIN D DEFICIENCY: ICD-10-CM

## 2024-06-06 DIAGNOSIS — R07.9 CHEST PAIN, UNSPECIFIED TYPE: Primary | ICD-10-CM

## 2024-06-06 DIAGNOSIS — D50.9 IRON DEFICIENCY ANEMIA, UNSPECIFIED IRON DEFICIENCY ANEMIA TYPE: ICD-10-CM

## 2024-06-06 LAB
ABO GROUP (TYPE) IN BLOOD: NORMAL
ALBUMIN SERPL BCP-MCNC: 4.1 G/DL (ref 3.4–5)
ALP SERPL-CCNC: 72 U/L (ref 33–136)
ALT SERPL W P-5'-P-CCNC: 17 U/L (ref 7–45)
ANION GAP SERPL CALC-SCNC: 12 MMOL/L (ref 10–20)
ANTIBODY SCREEN: NORMAL
APPEARANCE UR: CLEAR
APTT PPP: 27 SECONDS (ref 27–38)
AST SERPL W P-5'-P-CCNC: 27 U/L (ref 9–39)
ATRIAL RATE: 69 BPM
BASOPHILS # BLD AUTO: 0.05 X10*3/UL (ref 0–0.1)
BASOPHILS NFR BLD AUTO: 0.8 %
BILIRUB DIRECT SERPL-MCNC: 0.1 MG/DL (ref 0–0.3)
BILIRUB SERPL-MCNC: 0.7 MG/DL (ref 0–1.2)
BILIRUB UR STRIP.AUTO-MCNC: NEGATIVE MG/DL
BNP SERPL-MCNC: 15 PG/ML (ref 0–99)
BUN SERPL-MCNC: 24 MG/DL (ref 6–23)
CA-I BLD-SCNC: 1.02 MMOL/L (ref 1.1–1.33)
CA-I BLD-SCNC: 1.37 MMOL/L (ref 1.1–1.33)
CALCIUM SERPL-MCNC: 9.4 MG/DL (ref 8.6–10.6)
CARDIAC TROPONIN I PNL SERPL HS: 14 NG/L (ref 0–34)
CARDIAC TROPONIN I PNL SERPL HS: 16 NG/L (ref 0–34)
CHLORIDE SERPL-SCNC: 102 MMOL/L (ref 98–107)
CHOLEST SERPL-MCNC: 200 MG/DL (ref 0–199)
CHOLESTEROL/HDL RATIO: 2.3
CO2 SERPL-SCNC: 27 MMOL/L (ref 21–32)
COLOR UR: COLORLESS
CREAT SERPL-MCNC: 0.76 MG/DL (ref 0.5–1.05)
D DIMER PPP FEU-MCNC: 1426 NG/ML FEU
EGFRCR SERPLBLD CKD-EPI 2021: 84 ML/MIN/1.73M*2
EOSINOPHIL # BLD AUTO: 0.05 X10*3/UL (ref 0–0.4)
EOSINOPHIL NFR BLD AUTO: 0.8 %
ERYTHROCYTE [DISTWIDTH] IN BLOOD BY AUTOMATED COUNT: 16.9 % (ref 11.5–14.5)
ERYTHROCYTE [DISTWIDTH] IN BLOOD BY AUTOMATED COUNT: 17.2 % (ref 11.5–14.5)
EST. AVERAGE GLUCOSE BLD GHB EST-MCNC: 117 MG/DL
FERRITIN SERPL-MCNC: 17 NG/ML (ref 8–150)
FIBRINOGEN PPP-MCNC: 321 MG/DL (ref 200–400)
FLUAV RNA RESP QL NAA+PROBE: NOT DETECTED
FLUBV RNA RESP QL NAA+PROBE: NOT DETECTED
GLUCOSE SERPL-MCNC: 90 MG/DL (ref 74–99)
GLUCOSE UR STRIP.AUTO-MCNC: NORMAL MG/DL
HBA1C MFR BLD: 5.7 %
HCT VFR BLD AUTO: 29.4 % (ref 36–46)
HCT VFR BLD AUTO: 35.3 % (ref 36–46)
HCYS SERPL-SCNC: 8.17 UMOL/L (ref 5–13.9)
HDLC SERPL-MCNC: 88.8 MG/DL
HGB BLD-MCNC: 11.3 G/DL (ref 12–16)
HGB BLD-MCNC: 9.8 G/DL (ref 12–16)
IMM GRANULOCYTES # BLD AUTO: 0.01 X10*3/UL (ref 0–0.5)
IMM GRANULOCYTES NFR BLD AUTO: 0.2 % (ref 0–0.9)
INR PPP: 1 (ref 0.9–1.1)
IRON SATN MFR SERPL: 8 % (ref 25–45)
IRON SERPL-MCNC: 36 UG/DL (ref 35–150)
KETONES UR STRIP.AUTO-MCNC: NEGATIVE MG/DL
LDLC SERPL CALC-MCNC: 94 MG/DL
LEUKOCYTE ESTERASE UR QL STRIP.AUTO: ABNORMAL
LYMPHOCYTES # BLD AUTO: 2.65 X10*3/UL (ref 0.8–3)
LYMPHOCYTES NFR BLD AUTO: 41.1 %
MCH RBC QN AUTO: 27 PG (ref 26–34)
MCH RBC QN AUTO: 27.5 PG (ref 26–34)
MCHC RBC AUTO-ENTMCNC: 32 G/DL (ref 32–36)
MCHC RBC AUTO-ENTMCNC: 33.3 G/DL (ref 32–36)
MCV RBC AUTO: 83 FL (ref 80–100)
MCV RBC AUTO: 84 FL (ref 80–100)
MONOCYTES # BLD AUTO: 0.68 X10*3/UL (ref 0.05–0.8)
MONOCYTES NFR BLD AUTO: 10.6 %
MUCOUS THREADS #/AREA URNS AUTO: NORMAL /LPF
NEUTROPHILS # BLD AUTO: 3 X10*3/UL (ref 1.6–5.5)
NEUTROPHILS NFR BLD AUTO: 46.5 %
NITRITE UR QL STRIP.AUTO: NEGATIVE
NON HDL CHOLESTEROL: 111 MG/DL (ref 0–149)
NRBC BLD-RTO: 0 /100 WBCS (ref 0–0)
NRBC BLD-RTO: 0 /100 WBCS (ref 0–0)
P AXIS: 60 DEGREES
P OFFSET: 192 MS
P ONSET: 141 MS
PH UR STRIP.AUTO: 7.5 [PH]
PLATELET # BLD AUTO: 242 X10*3/UL (ref 150–450)
PLATELET # BLD AUTO: 277 X10*3/UL (ref 150–450)
POTASSIUM SERPL-SCNC: 3.9 MMOL/L (ref 3.5–5.3)
PR INTERVAL: 156 MS
PROT SERPL-MCNC: 7.5 G/DL (ref 6.4–8.2)
PROT UR STRIP.AUTO-MCNC: NEGATIVE MG/DL
PROTHROMBIN TIME: 11.4 SECONDS (ref 9.8–12.8)
Q ONSET: 219 MS
QRS COUNT: 11 BEATS
QRS DURATION: 78 MS
QT INTERVAL: 402 MS
QTC CALCULATION(BAZETT): 430 MS
QTC FREDERICIA: 421 MS
R AXIS: 22 DEGREES
RBC # BLD AUTO: 3.56 X10*6/UL (ref 4–5.2)
RBC # BLD AUTO: 4.18 X10*6/UL (ref 4–5.2)
RBC # UR STRIP.AUTO: NEGATIVE /UL
RBC #/AREA URNS AUTO: NORMAL /HPF
RH FACTOR (ANTIGEN D): NORMAL
SARS-COV-2 RNA RESP QL NAA+PROBE: NOT DETECTED
SODIUM SERPL-SCNC: 137 MMOL/L (ref 136–145)
SP GR UR STRIP.AUTO: 1.02
T AXIS: 46 DEGREES
T OFFSET: 420 MS
TIBC SERPL-MCNC: 445 UG/DL (ref 240–445)
TRIGL SERPL-MCNC: 88 MG/DL (ref 0–149)
TSH SERPL-ACNC: 1.35 MIU/L (ref 0.44–3.98)
UIBC SERPL-MCNC: 409 UG/DL (ref 110–370)
UROBILINOGEN UR STRIP.AUTO-MCNC: NORMAL MG/DL
VENTRICULAR RATE: 69 BPM
VLDL: 18 MG/DL (ref 0–40)
WBC # BLD AUTO: 6.4 X10*3/UL (ref 4.4–11.3)
WBC # BLD AUTO: 7.5 X10*3/UL (ref 4.4–11.3)
WBC #/AREA URNS AUTO: NORMAL /HPF

## 2024-06-06 PROCEDURE — 99221 1ST HOSP IP/OBS SF/LOW 40: CPT

## 2024-06-06 PROCEDURE — 84484 ASSAY OF TROPONIN QUANT: CPT | Performed by: EMERGENCY MEDICINE

## 2024-06-06 PROCEDURE — 82330 ASSAY OF CALCIUM: CPT | Mod: 91

## 2024-06-06 PROCEDURE — C1752 CATH,HEMODIALYSIS,SHORT-TERM: HCPCS

## 2024-06-06 PROCEDURE — 36415 COLL VENOUS BLD VENIPUNCTURE: CPT | Performed by: EMERGENCY MEDICINE

## 2024-06-06 PROCEDURE — 2500000002 HC RX 250 W HCPCS SELF ADMINISTERED DRUGS (ALT 637 FOR MEDICARE OP, ALT 636 FOR OP/ED)

## 2024-06-06 PROCEDURE — 99223 1ST HOSP IP/OBS HIGH 75: CPT

## 2024-06-06 PROCEDURE — 2500000001 HC RX 250 WO HCPCS SELF ADMINISTERED DRUGS (ALT 637 FOR MEDICARE OP)

## 2024-06-06 PROCEDURE — 71046 X-RAY EXAM CHEST 2 VIEWS: CPT

## 2024-06-06 PROCEDURE — 83921 ORGANIC ACID SINGLE QUANT: CPT

## 2024-06-06 PROCEDURE — 2500000004 HC RX 250 GENERAL PHARMACY W/ HCPCS (ALT 636 FOR OP/ED)

## 2024-06-06 PROCEDURE — 84443 ASSAY THYROID STIM HORMONE: CPT

## 2024-06-06 PROCEDURE — 87636 SARSCOV2 & INF A&B AMP PRB: CPT

## 2024-06-06 PROCEDURE — 2500000004 HC RX 250 GENERAL PHARMACY W/ HCPCS (ALT 636 FOR OP/ED): Performed by: RADIOLOGY

## 2024-06-06 PROCEDURE — 6A551Z3 PHERESIS OF PLASMA, MULTIPLE: ICD-10-PCS | Performed by: STUDENT IN AN ORGANIZED HEALTH CARE EDUCATION/TRAINING PROGRAM

## 2024-06-06 PROCEDURE — 85025 COMPLETE CBC W/AUTO DIFF WBC: CPT | Performed by: STUDENT IN AN ORGANIZED HEALTH CARE EDUCATION/TRAINING PROGRAM

## 2024-06-06 PROCEDURE — 1100000001 HC PRIVATE ROOM DAILY

## 2024-06-06 PROCEDURE — 02HV33Z INSERTION OF INFUSION DEVICE INTO SUPERIOR VENA CAVA, PERCUTANEOUS APPROACH: ICD-10-PCS | Performed by: RADIOLOGY

## 2024-06-06 PROCEDURE — 83036 HEMOGLOBIN GLYCOSYLATED A1C: CPT

## 2024-06-06 PROCEDURE — 36556 INSERT NON-TUNNEL CV CATH: CPT | Performed by: RADIOLOGY

## 2024-06-06 PROCEDURE — 82330 ASSAY OF CALCIUM: CPT | Performed by: STUDENT IN AN ORGANIZED HEALTH CARE EDUCATION/TRAINING PROGRAM

## 2024-06-06 PROCEDURE — 77001 FLUOROGUIDE FOR VEIN DEVICE: CPT | Performed by: RADIOLOGY

## 2024-06-06 PROCEDURE — 36415 COLL VENOUS BLD VENIPUNCTURE: CPT

## 2024-06-06 PROCEDURE — P9045 ALBUMIN (HUMAN), 5%, 250 ML: HCPCS | Mod: JZ

## 2024-06-06 PROCEDURE — 81003 URINALYSIS AUTO W/O SCOPE: CPT | Performed by: STUDENT IN AN ORGANIZED HEALTH CARE EDUCATION/TRAINING PROGRAM

## 2024-06-06 PROCEDURE — 71275 CT ANGIOGRAPHY CHEST: CPT

## 2024-06-06 PROCEDURE — 36514 APHERESIS PLASMA: CPT

## 2024-06-06 PROCEDURE — 7100000009 HC PHASE TWO TIME - INITIAL BASE CHARGE

## 2024-06-06 PROCEDURE — 71046 X-RAY EXAM CHEST 2 VIEWS: CPT | Performed by: RADIOLOGY

## 2024-06-06 PROCEDURE — 2550000001 HC RX 255 CONTRASTS: Performed by: STUDENT IN AN ORGANIZED HEALTH CARE EDUCATION/TRAINING PROGRAM

## 2024-06-06 PROCEDURE — 83090 ASSAY OF HOMOCYSTEINE: CPT

## 2024-06-06 PROCEDURE — 85027 COMPLETE CBC AUTOMATED: CPT

## 2024-06-06 PROCEDURE — 85379 FIBRIN DEGRADATION QUANT: CPT

## 2024-06-06 PROCEDURE — 2500000001 HC RX 250 WO HCPCS SELF ADMINISTERED DRUGS (ALT 637 FOR MEDICARE OP): Performed by: STUDENT IN AN ORGANIZED HEALTH CARE EDUCATION/TRAINING PROGRAM

## 2024-06-06 PROCEDURE — 85384 FIBRINOGEN ACTIVITY: CPT | Performed by: STUDENT IN AN ORGANIZED HEALTH CARE EDUCATION/TRAINING PROGRAM

## 2024-06-06 PROCEDURE — 94150 VITAL CAPACITY TEST: CPT

## 2024-06-06 PROCEDURE — 36010 PLACE CATHETER IN VEIN: CPT | Performed by: RADIOLOGY

## 2024-06-06 PROCEDURE — C1894 INTRO/SHEATH, NON-LASER: HCPCS

## 2024-06-06 PROCEDURE — 2780000003 HC OR 278 NO HCPCS

## 2024-06-06 PROCEDURE — 2500000005 HC RX 250 GENERAL PHARMACY W/O HCPCS

## 2024-06-06 PROCEDURE — 86901 BLOOD TYPING SEROLOGIC RH(D): CPT | Performed by: STUDENT IN AN ORGANIZED HEALTH CARE EDUCATION/TRAINING PROGRAM

## 2024-06-06 PROCEDURE — 71275 CT ANGIOGRAPHY CHEST: CPT | Performed by: RADIOLOGY

## 2024-06-06 PROCEDURE — 85610 PROTHROMBIN TIME: CPT | Performed by: STUDENT IN AN ORGANIZED HEALTH CARE EDUCATION/TRAINING PROGRAM

## 2024-06-06 PROCEDURE — 94760 N-INVAS EAR/PLS OXIMETRY 1: CPT

## 2024-06-06 PROCEDURE — 2500000004 HC RX 250 GENERAL PHARMACY W/ HCPCS (ALT 636 FOR OP/ED): Performed by: STUDENT IN AN ORGANIZED HEALTH CARE EDUCATION/TRAINING PROGRAM

## 2024-06-06 PROCEDURE — 7100000010 HC PHASE TWO TIME - EACH INCREMENTAL 1 MINUTE

## 2024-06-06 PROCEDURE — 2720000007 HC OR 272 NO HCPCS

## 2024-06-06 RX ORDER — CALCIUM GLUCONATE 20 MG/ML
4223 INJECTION, SOLUTION INTRAVENOUS ONCE
Status: COMPLETED | OUTPATIENT
Start: 2024-06-06 | End: 2024-06-06

## 2024-06-06 RX ORDER — FERROUS SULFATE 325(65) MG
65 TABLET ORAL
Status: DISCONTINUED | OUTPATIENT
Start: 2024-06-07 | End: 2024-06-13 | Stop reason: HOSPADM

## 2024-06-06 RX ORDER — LIDOCAINE 560 MG/1
1 PATCH PERCUTANEOUS; TOPICAL; TRANSDERMAL DAILY
Status: DISCONTINUED | OUTPATIENT
Start: 2024-06-06 | End: 2024-06-13 | Stop reason: HOSPADM

## 2024-06-06 RX ORDER — CHOLECALCIFEROL (VITAMIN D3) 25 MCG
5000 TABLET ORAL DAILY
Status: DISCONTINUED | OUTPATIENT
Start: 2024-06-06 | End: 2024-06-13 | Stop reason: HOSPADM

## 2024-06-06 RX ORDER — PYRIDOSTIGMINE BROMIDE 180 MG/1
180 TABLET, EXTENDED RELEASE ORAL DAILY
Status: DISCONTINUED | OUTPATIENT
Start: 2024-06-06 | End: 2024-06-06

## 2024-06-06 RX ORDER — POLYETHYLENE GLYCOL 3350 17 G/17G
17 POWDER, FOR SOLUTION ORAL DAILY
Status: DISCONTINUED | OUTPATIENT
Start: 2024-06-06 | End: 2024-06-13 | Stop reason: HOSPADM

## 2024-06-06 RX ORDER — ENOXAPARIN SODIUM 100 MG/ML
40 INJECTION SUBCUTANEOUS EVERY 24 HOURS
Status: DISCONTINUED | OUTPATIENT
Start: 2024-06-06 | End: 2024-06-13 | Stop reason: HOSPADM

## 2024-06-06 RX ORDER — ACETAMINOPHEN 325 MG/1
650 TABLET ORAL EVERY 6 HOURS PRN
Status: DISCONTINUED | OUTPATIENT
Start: 2024-06-06 | End: 2024-06-13 | Stop reason: HOSPADM

## 2024-06-06 RX ORDER — PYRIDOSTIGMINE BROMIDE 60 MG/1
60 TABLET ORAL
Status: DISCONTINUED | OUTPATIENT
Start: 2024-06-06 | End: 2024-06-06

## 2024-06-06 RX ORDER — DULOXETIN HYDROCHLORIDE 60 MG/1
60 CAPSULE, DELAYED RELEASE ORAL DAILY
Status: DISCONTINUED | OUTPATIENT
Start: 2024-06-06 | End: 2024-06-13 | Stop reason: HOSPADM

## 2024-06-06 RX ORDER — FENTANYL CITRATE 50 UG/ML
INJECTION, SOLUTION INTRAMUSCULAR; INTRAVENOUS
Status: COMPLETED | OUTPATIENT
Start: 2024-06-06 | End: 2024-06-06

## 2024-06-06 RX ORDER — PYRIDOSTIGMINE BROMIDE 60 MG/1
60 TABLET ORAL 4 TIMES DAILY
Status: DISCONTINUED | OUTPATIENT
Start: 2024-06-06 | End: 2024-06-06

## 2024-06-06 RX ORDER — PREDNISONE 10 MG/1
10 TABLET ORAL DAILY
Status: DISCONTINUED | OUTPATIENT
Start: 2024-06-06 | End: 2024-06-06

## 2024-06-06 RX ORDER — ACETAMINOPHEN 325 MG/1
975 TABLET ORAL ONCE AS NEEDED
Status: COMPLETED | OUTPATIENT
Start: 2024-06-06 | End: 2024-06-06

## 2024-06-06 RX ORDER — METHOTREXATE 2.5 MG/1
20 TABLET ORAL
Status: DISCONTINUED | OUTPATIENT
Start: 2024-06-06 | End: 2024-06-13 | Stop reason: HOSPADM

## 2024-06-06 RX ORDER — HEPARIN SODIUM 1000 [USP'U]/ML
1000 INJECTION, SOLUTION INTRAVENOUS; SUBCUTANEOUS ONCE
Status: COMPLETED | OUTPATIENT
Start: 2024-06-06 | End: 2024-06-06

## 2024-06-06 RX ORDER — ALBUMIN HUMAN 50 G/1000ML
12.5 SOLUTION INTRAVENOUS
Status: COMPLETED | OUTPATIENT
Start: 2024-06-06 | End: 2024-06-06

## 2024-06-06 RX ORDER — CALCIUM CARBONATE 200(500)MG
1500 TABLET,CHEWABLE ORAL EVERY 5 MIN PRN
Status: DISCONTINUED | OUTPATIENT
Start: 2024-06-06 | End: 2024-06-06 | Stop reason: HOSPADM

## 2024-06-06 RX ORDER — TRAZODONE HYDROCHLORIDE 50 MG/1
25 TABLET ORAL NIGHTLY PRN
Status: DISCONTINUED | OUTPATIENT
Start: 2024-06-06 | End: 2024-06-09

## 2024-06-06 RX ORDER — CALCIUM GLUCONATE 20 MG/ML
1 INJECTION, SOLUTION INTRAVENOUS ONCE
Status: COMPLETED | OUTPATIENT
Start: 2024-06-06 | End: 2024-06-06

## 2024-06-06 RX ORDER — METHOTREXATE 2.5 MG/1
20 TABLET ORAL
Status: DISCONTINUED | OUTPATIENT
Start: 2024-06-06 | End: 2024-06-06

## 2024-06-06 RX ORDER — GABAPENTIN 300 MG/1
300 CAPSULE ORAL NIGHTLY
Status: DISCONTINUED | OUTPATIENT
Start: 2024-06-06 | End: 2024-06-13 | Stop reason: HOSPADM

## 2024-06-06 RX ORDER — PANTOPRAZOLE SODIUM 40 MG/1
40 TABLET, DELAYED RELEASE ORAL DAILY
Status: DISCONTINUED | OUTPATIENT
Start: 2024-06-06 | End: 2024-06-13 | Stop reason: HOSPADM

## 2024-06-06 RX ORDER — DOCUSATE SODIUM 100 MG/1
100 CAPSULE, LIQUID FILLED ORAL 2 TIMES DAILY
Status: DISCONTINUED | OUTPATIENT
Start: 2024-06-06 | End: 2024-06-13 | Stop reason: HOSPADM

## 2024-06-06 RX ORDER — PYRIDOSTIGMINE BROMIDE 180 MG/1
180 TABLET, EXTENDED RELEASE ORAL NIGHTLY
Status: DISCONTINUED | OUTPATIENT
Start: 2024-06-06 | End: 2024-06-13 | Stop reason: HOSPADM

## 2024-06-06 RX ORDER — NAPROXEN SODIUM 220 MG/1
324 TABLET, FILM COATED ORAL ONCE
Status: COMPLETED | OUTPATIENT
Start: 2024-06-06 | End: 2024-06-06

## 2024-06-06 RX ORDER — METHOTREXATE 2.5 MG/1
20 TABLET ORAL
Status: DISCONTINUED | OUTPATIENT
Start: 2024-06-09 | End: 2024-06-06

## 2024-06-06 RX ORDER — FOLIC ACID 1 MG/1
1 TABLET ORAL DAILY
Status: DISCONTINUED | OUTPATIENT
Start: 2024-06-06 | End: 2024-06-13 | Stop reason: HOSPADM

## 2024-06-06 RX ORDER — PREDNISONE 10 MG/1
10 TABLET ORAL DAILY
Status: DISCONTINUED | OUTPATIENT
Start: 2024-06-06 | End: 2024-06-13 | Stop reason: HOSPADM

## 2024-06-06 RX ORDER — PYRIDOSTIGMINE BROMIDE 60 MG/1
60 TABLET ORAL EVERY 4 HOURS
Status: DISCONTINUED | OUTPATIENT
Start: 2024-06-06 | End: 2024-06-07

## 2024-06-06 RX ADMIN — CALCIUM GLUCONATE 4223 MG: 20 INJECTION, SOLUTION INTRAVENOUS at 16:36

## 2024-06-06 RX ADMIN — ACETAMINOPHEN 650 MG: 325 TABLET ORAL at 17:55

## 2024-06-06 RX ADMIN — ACETAMINOPHEN 975 MG: 325 TABLET ORAL at 21:27

## 2024-06-06 RX ADMIN — PYRIDOSTIGMINE BROMIDE 60 MG: 60 TABLET ORAL at 13:13

## 2024-06-06 RX ADMIN — Medication 5000 UNITS: at 09:55

## 2024-06-06 RX ADMIN — ALBUMIN HUMAN 12.5 G: 0.05 INJECTION, SOLUTION INTRAVENOUS at 17:24

## 2024-06-06 RX ADMIN — ASPIRIN 81 MG 324 MG: 81 TABLET ORAL at 02:08

## 2024-06-06 RX ADMIN — LIDOCAINE 1 PATCH: 4 PATCH TOPICAL at 21:19

## 2024-06-06 RX ADMIN — GABAPENTIN 300 MG: 300 CAPSULE ORAL at 21:19

## 2024-06-06 RX ADMIN — HEPARIN SODIUM 1100 UNITS: 1000 INJECTION INTRAVENOUS; SUBCUTANEOUS at 17:49

## 2024-06-06 RX ADMIN — PREDNISONE 10 MG: 10 TABLET ORAL at 10:04

## 2024-06-06 RX ADMIN — ALBUMIN HUMAN 12.5 G: 0.05 INJECTION, SOLUTION INTRAVENOUS at 17:17

## 2024-06-06 RX ADMIN — PYRIDOSTIGMINE BROMIDE 180 MG: 180 TABLET ORAL at 21:18

## 2024-06-06 RX ADMIN — ENOXAPARIN SODIUM 40 MG: 40 INJECTION, SOLUTION SUBCUTANEOUS at 09:55

## 2024-06-06 RX ADMIN — POLYETHYLENE GLYCOL 3350 17 G: 17 POWDER, FOR SOLUTION ORAL at 21:22

## 2024-06-06 RX ADMIN — FOLIC ACID 1 MG: 1 TABLET ORAL at 09:55

## 2024-06-06 RX ADMIN — ACETAMINOPHEN 650 MG: 325 TABLET ORAL at 09:55

## 2024-06-06 RX ADMIN — TRAZODONE HYDROCHLORIDE 25 MG: 50 TABLET ORAL at 21:17

## 2024-06-06 RX ADMIN — DULOXETINE HYDROCHLORIDE 60 MG: 60 CAPSULE, DELAYED RELEASE ORAL at 09:55

## 2024-06-06 RX ADMIN — PYRIDOSTIGMINE BROMIDE 60 MG: 60 TABLET ORAL at 16:29

## 2024-06-06 RX ADMIN — ALBUMIN HUMAN 12.5 G: 0.05 INJECTION, SOLUTION INTRAVENOUS at 16:50

## 2024-06-06 RX ADMIN — IOHEXOL 80 ML: 350 INJECTION, SOLUTION INTRAVENOUS at 05:13

## 2024-06-06 RX ADMIN — DOCUSATE SODIUM 100 MG: 100 CAPSULE, LIQUID FILLED ORAL at 21:18

## 2024-06-06 RX ADMIN — PYRIDOSTIGMINE BROMIDE 60 MG: 60 TABLET ORAL at 21:18

## 2024-06-06 RX ADMIN — PANTOPRAZOLE SODIUM 40 MG: 40 TABLET, DELAYED RELEASE ORAL at 09:55

## 2024-06-06 RX ADMIN — ALBUMIN HUMAN 12.5 G: 0.05 INJECTION, SOLUTION INTRAVENOUS at 17:32

## 2024-06-06 RX ADMIN — ALBUMIN HUMAN 12.5 G: 0.05 INJECTION, SOLUTION INTRAVENOUS at 17:39

## 2024-06-06 RX ADMIN — ALBUMIN HUMAN 12.5 G: 0.05 INJECTION, SOLUTION INTRAVENOUS at 16:39

## 2024-06-06 RX ADMIN — ALBUMIN HUMAN 12.5 G: 0.05 INJECTION, SOLUTION INTRAVENOUS at 17:47

## 2024-06-06 RX ADMIN — ALBUMIN HUMAN 12.5 G: 0.05 INJECTION, SOLUTION INTRAVENOUS at 16:56

## 2024-06-06 RX ADMIN — ALBUMIN HUMAN 12.5 G: 0.05 INJECTION, SOLUTION INTRAVENOUS at 17:03

## 2024-06-06 RX ADMIN — ALBUMIN HUMAN 12.5 G: 0.05 INJECTION, SOLUTION INTRAVENOUS at 17:10

## 2024-06-06 RX ADMIN — CALCIUM GLUCONATE 1 G: 20 INJECTION, SOLUTION INTRAVENOUS at 13:11

## 2024-06-06 RX ADMIN — PYRIDOSTIGMINE BROMIDE 60 MG: 60 TABLET ORAL at 10:55

## 2024-06-06 RX ADMIN — METHOTREXATE 20 MG: 2.5 TABLET ORAL at 10:59

## 2024-06-06 RX ADMIN — DOCUSATE SODIUM 100 MG: 100 CAPSULE, LIQUID FILLED ORAL at 09:55

## 2024-06-06 RX ADMIN — FENTANYL CITRATE 50 MCG: 50 INJECTION, SOLUTION INTRAMUSCULAR; INTRAVENOUS at 15:25

## 2024-06-06 SDOH — ECONOMIC STABILITY: INCOME INSECURITY: IN THE LAST 12 MONTHS, WAS THERE A TIME WHEN YOU WERE NOT ABLE TO PAY THE MORTGAGE OR RENT ON TIME?: NO

## 2024-06-06 SDOH — SOCIAL STABILITY: SOCIAL INSECURITY: HAVE YOU HAD THOUGHTS OF HARMING ANYONE ELSE?: NO

## 2024-06-06 SDOH — SOCIAL STABILITY: SOCIAL INSECURITY: ARE YOU OR HAVE YOU BEEN THREATENED OR ABUSED PHYSICALLY, EMOTIONALLY, OR SEXUALLY BY ANYONE?: NO

## 2024-06-06 SDOH — ECONOMIC STABILITY: TRANSPORTATION INSECURITY
IN THE PAST 12 MONTHS, HAS LACK OF TRANSPORTATION KEPT YOU FROM MEETINGS, WORK, OR FROM GETTING THINGS NEEDED FOR DAILY LIVING?: NO

## 2024-06-06 SDOH — ECONOMIC STABILITY: HOUSING INSECURITY
IN THE LAST 12 MONTHS, WAS THERE A TIME WHEN YOU DID NOT HAVE A STEADY PLACE TO SLEEP OR SLEPT IN A SHELTER (INCLUDING NOW)?: NO

## 2024-06-06 SDOH — SOCIAL STABILITY: SOCIAL INSECURITY: ARE THERE ANY APPARENT SIGNS OF INJURIES/BEHAVIORS THAT COULD BE RELATED TO ABUSE/NEGLECT?: NO

## 2024-06-06 SDOH — ECONOMIC STABILITY: TRANSPORTATION INSECURITY
IN THE PAST 12 MONTHS, HAS THE LACK OF TRANSPORTATION KEPT YOU FROM MEDICAL APPOINTMENTS OR FROM GETTING MEDICATIONS?: NO

## 2024-06-06 SDOH — SOCIAL STABILITY: SOCIAL INSECURITY: DO YOU FEEL UNSAFE GOING BACK TO THE PLACE WHERE YOU ARE LIVING?: NO

## 2024-06-06 SDOH — SOCIAL STABILITY: SOCIAL INSECURITY: ABUSE: ADULT

## 2024-06-06 SDOH — SOCIAL STABILITY: SOCIAL INSECURITY: WERE YOU ABLE TO COMPLETE ALL THE BEHAVIORAL HEALTH SCREENINGS?: NO

## 2024-06-06 SDOH — SOCIAL STABILITY: SOCIAL INSECURITY: HAS ANYONE EVER THREATENED TO HURT YOUR FAMILY OR YOUR PETS?: NO

## 2024-06-06 SDOH — ECONOMIC STABILITY: HOUSING INSECURITY: IN THE LAST 12 MONTHS, HOW MANY PLACES HAVE YOU LIVED?: 1

## 2024-06-06 SDOH — ECONOMIC STABILITY: INCOME INSECURITY: HOW HARD IS IT FOR YOU TO PAY FOR THE VERY BASICS LIKE FOOD, HOUSING, MEDICAL CARE, AND HEATING?: NOT HARD AT ALL

## 2024-06-06 SDOH — SOCIAL STABILITY: SOCIAL INSECURITY: HAVE YOU HAD ANY THOUGHTS OF HARMING ANYONE ELSE?: NO

## 2024-06-06 SDOH — SOCIAL STABILITY: SOCIAL INSECURITY: DOES ANYONE TRY TO KEEP YOU FROM HAVING/CONTACTING OTHER FRIENDS OR DOING THINGS OUTSIDE YOUR HOME?: NO

## 2024-06-06 ASSESSMENT — COGNITIVE AND FUNCTIONAL STATUS - GENERAL
PATIENT BASELINE BEDBOUND: NO
PATIENT BASELINE BEDBOUND: NO
MOBILITY SCORE: 24
MOBILITY SCORE: 24
DAILY ACTIVITIY SCORE: 24

## 2024-06-06 ASSESSMENT — PATIENT HEALTH QUESTIONNAIRE - PHQ9
2. FEELING DOWN, DEPRESSED OR HOPELESS: NOT AT ALL
1. LITTLE INTEREST OR PLEASURE IN DOING THINGS: NOT AT ALL
SUM OF ALL RESPONSES TO PHQ9 QUESTIONS 1 & 2: 0

## 2024-06-06 ASSESSMENT — PAIN SCALES - GENERAL
PAINLEVEL_OUTOF10: 0 - NO PAIN
PAINLEVEL_OUTOF10: 10 - WORST POSSIBLE PAIN
PAINLEVEL_OUTOF10: 0 - NO PAIN
PAINLEVEL_OUTOF10: 9
PAINLEVEL_OUTOF10: 0 - NO PAIN
PAINLEVEL_OUTOF10: 6
PAINLEVEL_OUTOF10: 0 - NO PAIN

## 2024-06-06 ASSESSMENT — PAIN - FUNCTIONAL ASSESSMENT
PAIN_FUNCTIONAL_ASSESSMENT: 0-10

## 2024-06-06 ASSESSMENT — LIFESTYLE VARIABLES
HOW OFTEN DO YOU HAVE 6 OR MORE DRINKS ON ONE OCCASION: NEVER
HOW OFTEN DO YOU HAVE A DRINK CONTAINING ALCOHOL: 2-3 TIMES A WEEK
AUDIT-C TOTAL SCORE: 3
AUDIT-C TOTAL SCORE: 3
HOW MANY STANDARD DRINKS CONTAINING ALCOHOL DO YOU HAVE ON A TYPICAL DAY: 1 OR 2
SKIP TO QUESTIONS 9-10: 1

## 2024-06-06 ASSESSMENT — HEART SCORE
HISTORY: HIGHLY SUSPICIOUS
TROPONIN: LESS THAN OR EQUAL TO NORMAL LIMIT
RISK FACTORS: 1-2 RISK FACTORS
ECG: NORMAL
HEART SCORE: 5
AGE: 65+

## 2024-06-06 ASSESSMENT — ACTIVITIES OF DAILY LIVING (ADL)
FEEDING YOURSELF: INDEPENDENT
HEARING - RIGHT EAR: FUNCTIONAL
WALKS IN HOME: INDEPENDENT
TOILETING: INDEPENDENT
BATHING: INDEPENDENT
ADEQUATE_TO_COMPLETE_ADL: YES
GROOMING: INDEPENDENT
PATIENT'S MEMORY ADEQUATE TO SAFELY COMPLETE DAILY ACTIVITIES?: YES
DRESSING YOURSELF: INDEPENDENT
HEARING - LEFT EAR: FUNCTIONAL
JUDGMENT_ADEQUATE_SAFELY_COMPLETE_DAILY_ACTIVITIES: YES

## 2024-06-06 ASSESSMENT — PAIN DESCRIPTION - LOCATION
LOCATION: NECK
LOCATION: BACK

## 2024-06-06 ASSESSMENT — PAIN DESCRIPTION - DESCRIPTORS: DESCRIPTORS: SQUEEZING

## 2024-06-06 NOTE — SIGNIFICANT EVENT
"Post-Rounding Updates:    Page Huston is a 69 y.o. female with a PMHx of seronegative myasthenia gravis (follows with Dr. Shipley), iron deficiency anemia (pending endoscopy with GI to evaluate for bleed) who presents to [facility] for worsening dyspnea on exertion, chest pain and reported \"abnormal EKG\" c/f MG exacerbation vs cardiac etiology. Admitted to neurology.    Subjective  Pt seen in her bed this morning lying down. Says that SOB has remained the same with exertion and prolonged talking. C/o \"achey\" pain between her shoulder blades, which is non-radiating and is not tender to palpation. Pt notes that her left upper eyelid is drooping today and that she has worsening hoarseness of her voice. Also states that she has not received her MG medications since 6:30 PM last evening. Says that she feels weaker. Denies dysphagia and fevers/chills.    Objective  MG-specific exam:  Ptosis on sustained upgaze after 5 seconds left eye and 18 seconds right eye  Single breath count: 22  Neck flexion: 5-/5  Neck extension: 4/5  +Dysarthria    Mental State:  Orientation: A&Ox4  Language: Intact for comprehension, repetition, expression, naming  Thought processes: Logical, organized  Concentration: Intact  Fund of knowledge: Appropriate    Cranial Nerves  - I/III: PERRL  - II: Visual fields intact to confrontation bilaterally tested, individually, and together  - III, IV, VI: EOM full to pursuit without nystagmus. Ptosis of left eyelid.   - V: V1-V3 sensation intact bilaterally  - VII: Face muscles symmetric with smile, eye closure, eyebrow raising, and cheek puffing  - VIII: Intact to interview  - IX, X: Palate elevated symmetrically, bilaterally. Hoarseness of voice  - XI: 5/5 strength on shoulder shrug bilaterally  - XII: Tongue midline without atrophy or fasciculations, no thrush    MOTOR EXAM:  Muscle bulk and tone were normal in UE and LE  No fasciculations, tremor, or other abnormal movements present     STRENGTH:      R      " "    L  Deltoid             5       4+  Biceps              5        5  Triceps             5        5                   5         5     Hip flexion       5-         5-  Quadriceps      5-         5-  Hamstrings      5-         5-  DorsiFlex          5-        5-  PlantarFlex       5-        5-     REFLEXES:        R          L  Biceps              3          3  Triceps             3          3  Brachioradialis 3          3  Patellar             3         3  Achilles            2          2  Plantar             Down      Down  No clonus     COORDINATION: Intact on finger to nose bilaterally  SENSORY: Intact to light touch, pin prick in bilateral UE and LE   GAIT: Deferred due to pt safety.    Assessment  Page Huston is a 69 y.o. female with a PMHx of seronegative myasthenia gravis (follows with Dr. Shipley), iron deficiency anemia (pending outpatient endoscopy with GI to evaluate for bleed) who presents to [facility] for worsening dyspnea on exertion, chest pain and reported \"abnormal EKG\" c/f MG exacerbation vs cardiac etiology. Admitted to neurology for MG exacerbation    Pt has continued fatigue, mild neck flex/ext weakness, increased weakness LE>UE, worsening ptosis L>R, and worsening hoarseness of voice that may be attributed to MG exacerbation/flare. Pt still c/o dyspnea on exertion, substernal chest pain, new back pain that may be due to cardiac etiology despite normal trop/EKG. Iron deficiency anemia may be contributing to her fatigue and shortness of breath.     Plan  #Exertional Dyspnea  #Chest pain  ::LDL 94, A1C 5.7  ::TSH WNL  - TTE to evaluate for cardiac etiology  - Cardiology consult given exertional dyspnea and chest pain, appreciate recs    #MG exacerbation  #Seronegative MG  - Outpatient neuromuscular specialist, Dr Shipley, aware of admission  - PLEX x 3 days   - Hold IVIG to avoid risk of hypercoagulation in the setting of a potential cardiac etiology for SOB  - NIF/VC q6h  - C/w home mestinon IR " 60q4 and ER 180mg at bedtime  - C/w home methotrexate 20mg qweek and prednisone 10mg  - C/w home gabapentin 300mg at bedtime  - LRP4 Ab sent per Dr Shipley recs    #Normocytic anemia  ::baseline Hg 12, on admission Hg 9.8  ::Folic acid, B12 WNL  ::Iron 36, TIBC 445, %Sat 8, ferritin 17  - C/w home folic acid 1mg  - Daily CBC to trend hgb  - Monitor for s/s bleeding  - Homocysteine and MMA pending  - Consulted medicine, appreciate recs    Kristy Greenfield, MS3  [employer]    I participated and contributed to the above medical student note including the HPI, physical exam, assessment, and plan. I agree with the above information.    Kristi Sánchez MD  Neurology PGY2

## 2024-06-06 NOTE — CONSULTS
"Internal Medicine Consult Note    Page Huston is a 69 y.o. year old female patient with PMHx of MG (on prednisone, methotrexate and IVIG), bullemia, and Iron deficiency anemia who is admitted for CP and exertional SOB as well as concern for Myasthenic crisis. Internal medicine was consulted for evaluation of chronic anemia and management.      Subjective   The patient presented to [facility] for worsening dyspnea on exertion, chest pain and reported \"abnormal EKG\" c/f MG exacerbation vs cardiac etiology. Patient was seen by her PCP today and sent to the ED for abnormal EKG. She denies any radiating chest pain to her back or her abdomen. She denies history of MI, hypertension, diabetes. She states she has had progressively worsening chest pain for the past few weeks and shortness of breath and now has some conversational dyspnea. She also endorses weakness in her hands and numbness around her mouth. She has double vision consistent with her myasthenia.     Internal Medicine consult team consulted for evaluation of chronic anemia. Patient has previously followed with hematology, per last inpatient hematology note from 5/12/2020 at that time the patient was started on IV iron and EGD/colonoscopy were recommended to rule out bleed. Per my chart review it does not appear that the patient has had those procedures complete.    Per discussion with the patient she states that she started taking 45mg iron supplements daily roughly two months ago. She was planned to have an EGD completed but was not due to anesthesia concerns given her MG. She reports no melena, hematochezia or hematemesis.         Physical Exam:  Constitutional: No acute distress, normal appearing  Head/Neck: Normocephalic, Atraumatic, Trachea midline  Eyes: Diplopia present, L. Eyelid drooping at pt's baseline  ENT: Mucous membranes moist, no lesions, no nasal discharge  Cardio: Heart RRR, clear S1 & S2, no murmurs or rubs appreciated, capillary refill < 2 " sec  Respiratory: Lungs cta b/l, good respiratory effort, mildly diminshed aeration bilaterally, no crackles, rhonchi or wheezes appreciated  Abdominal: Soft, nontender, nondistended,  Extremities: No peripheral edema, radial and DP pulses 2+ b/l  Skin: warm and dry, no redness, bruising or bleeding  Neuro: Aox3, CN grossly intact, moving all 4 extremities  Behavioral: appropriate mood and behavior    Echo: 2024  - Pending    EC24:  Normal sinus rhythm  Low voltage QRS  Cannot rule out Anterior infarct , age undetermined  Abnormal ECG  When compared with ECG of 04-MAY-2020 17:41,  QRS duration has increased  Minimal criteria for Anterior infarct are now Present  T wave inversion no longer evident in Inferior leads  Nonspecific T wave abnormality now evident in Anterior leads           Objective     Vitals:    24 0737   BP:    Pulse: 81   Resp:    Temp:    SpO2:        No intake or output data in the 24 hours ending 24 1149    Results for orders placed or performed during the hospital encounter of 24 (from the past 24 hour(s))   ECG 12 lead   Result Value Ref Range    Ventricular Rate 69 BPM    Atrial Rate 69 BPM    MS Interval 156 ms    QRS Duration 78 ms    QT Interval 402 ms    QTC Calculation(Bazett) 430 ms    P Axis 60 degrees    R Axis 22 degrees    T Axis 46 degrees    QRS Count 11 beats    Q Onset 219 ms    P Onset 141 ms    P Offset 192 ms    T Offset 420 ms    QTC Fredericia 421 ms   Basic metabolic panel   Result Value Ref Range    Glucose 90 74 - 99 mg/dL    Sodium 137 136 - 145 mmol/L    Potassium 3.9 3.5 - 5.3 mmol/L    Chloride 102 98 - 107 mmol/L    Bicarbonate 27 21 - 32 mmol/L    Anion Gap 12 10 - 20 mmol/L    Urea Nitrogen 24 (H) 6 - 23 mg/dL    Creatinine 0.76 0.50 - 1.05 mg/dL    eGFR 84 >60 mL/min/1.73m*2    Calcium 9.4 8.6 - 10.6 mg/dL   B-Type Natriuretic Peptide   Result Value Ref Range    BNP 15 0 - 99 pg/mL   Troponin I, High Sensitivity, Initial   Result Value  Ref Range    Troponin I, High Sensitivity 16 0 - 34 ng/L   Hepatic function panel   Result Value Ref Range    Albumin 4.1 3.4 - 5.0 g/dL    Bilirubin, Total 0.7 0.0 - 1.2 mg/dL    Bilirubin, Direct 0.1 0.0 - 0.3 mg/dL    Alkaline Phosphatase 72 33 - 136 U/L    ALT 17 7 - 45 U/L    AST 27 9 - 39 U/L    Total Protein 7.5 6.4 - 8.2 g/dL   Ferritin   Result Value Ref Range    Ferritin 17 8 - 150 ng/mL   Iron and TIBC   Result Value Ref Range    Iron 36 35 - 150 ug/dL    UIBC 409 (H) 110 - 370 ug/dL    TIBC 445 240 - 445 ug/dL    % Saturation 8 (L) 25 - 45 %   Troponin, High Sensitivity, 1 Hour   Result Value Ref Range    Troponin I, High Sensitivity 14 0 - 34 ng/L   TSH with reflex to Free T4 if abnormal   Result Value Ref Range    Thyroid Stimulating Hormone 1.35 0.44 - 3.98 mIU/L   Sars-CoV-2 PCR   Result Value Ref Range    Coronavirus 2019, PCR Not Detected Not Detected   Influenza A, and B PCR   Result Value Ref Range    Flu A Result Not Detected Not Detected    Flu B Result Not Detected Not Detected   D-dimer, quantitative   Result Value Ref Range    D-Dimer Non VTE, Quant (ng/mL FEU) 1,426 (H) <=500 ng/mL FEU   CBC and Auto Differential   Result Value Ref Range    WBC 6.4 4.4 - 11.3 x10*3/uL    nRBC 0.0 0.0 - 0.0 /100 WBCs    RBC 3.56 (L) 4.00 - 5.20 x10*6/uL    Hemoglobin 9.8 (L) 12.0 - 16.0 g/dL    Hematocrit 29.4 (L) 36.0 - 46.0 %    MCV 83 80 - 100 fL    MCH 27.5 26.0 - 34.0 pg    MCHC 33.3 32.0 - 36.0 g/dL    RDW 16.9 (H) 11.5 - 14.5 %    Platelets 242 150 - 450 x10*3/uL    Neutrophils % 46.5 40.0 - 80.0 %    Immature Granulocytes %, Automated 0.2 0.0 - 0.9 %    Lymphocytes % 41.1 13.0 - 44.0 %    Monocytes % 10.6 2.0 - 10.0 %    Eosinophils % 0.8 0.0 - 6.0 %    Basophils % 0.8 0.0 - 2.0 %    Neutrophils Absolute 3.00 1.60 - 5.50 x10*3/uL    Immature Granulocytes Absolute, Automated 0.01 0.00 - 0.50 x10*3/uL    Lymphocytes Absolute 2.65 0.80 - 3.00 x10*3/uL    Monocytes Absolute 0.68 0.05 - 0.80 x10*3/uL     Eosinophils Absolute 0.05 0.00 - 0.40 x10*3/uL    Basophils Absolute 0.05 0.00 - 0.10 x10*3/uL   Coagulation Screen   Result Value Ref Range    Protime 11.4 9.8 - 12.8 seconds    INR 1.0 0.9 - 1.1    aPTT 27 27 - 38 seconds   Calcium, ionized   Result Value Ref Range    POCT Calcium, Ionized 1.02 (L) 1.1 - 1.33 mmol/L   Fibrinogen   Result Value Ref Range    Fibrinogen 321 200 - 400 mg/dL       CT angio chest for pulmonary embolism    Result Date: 6/6/2024  Interpreted By:  Ann Herrera,  and Simba Thompson STUDY: CT ANGIO CHEST FOR PULMONARY EMBOLISM;  6/6/2024 5:17 am   INDICATION: Signs/Symptoms:SOB, elevated dimer.   COMPARISON: None   ACCESSION NUMBER(S): [id number]   ORDERING CLINICIAN: PRINCE COLE   TECHNIQUE: Helical data acquisition of the chest was obtained after intravenous administration of 80 ML Omnipaque 350, as per PE protocol. Images were reformatted in coronal and sagittal planes. Axial and coronal maximum intensity projection (MIP) images were created and reviewed.   FINDINGS: POTENTIAL LIMITATIONS OF THE STUDY: The assessment is limited by respiratory motion.   HEART AND VESSELS: No discrete filling defects within the main pulmonary artery or its branches to segmental level. Please note that, assessment of subsegmental branches is limited and small peripheral emboli are not entirely excluded. Main pulmonary artery and its branches are normal in caliber.   The thoracic aorta normal in course and caliber. Mild/minimal coronary artery calcifications are seen. Please note, the study is not optimized for evaluation of coronary arteries.   Right chest MediPort in place with the tip in the distal SVC.   The cardiac chambers are not enlarged.   There is no pericardial effusion seen.   MEDIASTINUM AND ODALIS, LOWER NECK AND AXILLA: The visualized thyroid gland is within normal limits. No evidence of thoracic lymphadenopathy by CT criteria. Esophagus appears within normal limits as seen.    LUNGS AND AIRWAYS: The trachea and central airways are patent. No endobronchial lesion is seen.There are atelectatic changes of the dependent portions of bilateral lungs.   The bilateral lungs are clear without evidence of focal consolidation, pleural effusion, or pneumothorax.     UPPER ABDOMEN: Small hiatal hernia. There are two hypodense lesions in the visualized portion of the liver measuring up to 1 cm, incompletely characterized but statistically representing cysts or small hemangioma..       CHEST WALL AND OSSEOUS STRUCTURES: Chest wall is within normal limits. No acute osseous pathology.There are no suspicious osseous lesions.       1. No evidence of acute pulmonary embolism to segmental level. Please note that, assessment of subsegmental branches is limited and small peripheral emboli are not entirely excluded. 2. No CT evidence of acute process in the chest. 3. Mild/minimal coronary artery calcification.   I personally reviewed the images/study and I agree with the findings as stated by resident physician Dr. Jay Mariano . This study was interpreted at [place], [place].   MACRO: None   Signed by: Ann Mcgovern 6/6/2024 6:53 AM Dictation workstation:   [id number]    ECG 12 lead    Result Date: 6/6/2024  Normal sinus rhythm Low voltage QRS Cannot rule out Anterior infarct , age undetermined Abnormal ECG When compared with ECG of 04-MAY-2020 17:41, QRS duration has increased Minimal criteria for Anterior infarct are now Present T wave inversion no longer evident in Inferior leads Nonspecific T wave abnormality now evident in Anterior leads See ED provider note for full interpretation and clinical correlation Confirmed by Berkley Babcock ([id number]) on 6/6/2024 4:46:03 AM    XR chest 2 views    Result Date: 6/6/2024  Interpreted By:  Yinka Saucedo and Afshari Mirak Sohrab STUDY: XR CHEST 2 VIEWS;  6/6/2024 1:31 am   INDICATION:  Signs/Symptoms:chest vpain.   COMPARISON: None.   ACCESSION NUMBER(S): [id number]   ORDERING CLINICIAN: NICOLE CARRASCO   FINDINGS: Right chest MediPort in place with the tip overlying upper SVC.   No consolidation, pleural effusion, or pneumothorax. Heart size is normal. No acute osseous abnormality. Diffuse osteopenia. Multilevel spinal degenerative change. Upper abdomen and superficial soft tissues are unremarkable.       1. No acute cardiopulmonary process.   I personally reviewed the images/study and I agree with the findings as stated by resident physician Dr. Jay Mariano . This study was interpreted at [place], [place].   Signed by: Yinka Saucedo 6/6/2024 3:50 AM Dictation workstation:   [id number]          Assessment   Page Huston is a 69 y.o. year old female patient with PMHx of MG (on prednisone, methotrexate and IVIG), bullemia, and Iron deficiency anemia who is admitted for CP and exertional SOB as well as concern for Myasthenic crisis. Internal medicine was consulted for evaluation of chronic anemia and management. Patient has previously been seen by hematology and at that time was recommended for endoscopy to evaluate for GI bleed as well as plans to start on iron supplementation which does not appear to have been started.      #Iron Deficiency Anemia  #Chronic Anemia  #Exertional SOB  ::Baseline Hgb ~ 9.0-10.0  ::Hgb on admission 9.8  ::Iron Studies: Iron 36, TIBC 445, UIBC 409, %Saturation 8%, Ferritin 17, RDW 16.9  ::MMA 0.13, Trops 14   ::D-Dimer: 1426, CT-PE negative  ::TTE pending  ::Patient taking 45mg oral iron daily  Recommendations:  - Patient has low normal levels of ferritin and iron as well as low %saturation consistent with UVALDO  - Recommend oral Ferrous Sulfate 325mg daily  - Given lack of symptoms for GI bleed and patient's relatively stable Hgb of 9.8 an EGD and colonoscopy do not seem indicated at this time given the  patient's increased risk under anesthesia.   - Patient's symptoms seem more likely related to cause outside of anemia as patient Hgb is near baseline and there has been no acute change.       The internal medicine consult service will sign off at this time, we thank you for the consult.       This patient was seen and discussed with the attending physician.    Andre Mack MD, PGY-1

## 2024-06-06 NOTE — POST-PROCEDURE NOTE
Pagedung Hsuton [id number]       Procedure type: Plasma Exchange    Replacement Fluids: 100% Albumin     Procedure Completed Without complications.    Attending notified of completion status. See flow sheet(s) for additional details.  Post-Vitals listed below.    Post-procedure vitals:  Vitals 1810  BP: 164/81  Temp: 36.5 °C (97.7 °F)  Heart Rate: 59  Resp: 20  SpO2: 98 %        Ramil Reyes, RN

## 2024-06-06 NOTE — Clinical Note
Right internal jugular trialysis placed. Catheter aspirated, capped, flushed, and locked. CHG dressing placed. Dressing clean, dry, and intact. Pt received 50 mcg fentantyl IVP. VAA. Pt transferred to RPCU, RPCU RN received report.

## 2024-06-06 NOTE — CONSULTS
"GENERAL NEUROLOGY CONSULT NOTE    Page Huston is a 69 y.o. female with a PMHx of seronegative myasthenia gravis (follows with Dr. Shipley), iron deficiency anemia (pending endoscopy with GI to evaluate for bleed) who presents to [facility] for worsening dyspnea on exertion, chest pain and reported \"abnormal EKG\" c/f MG exacerbation vs cardiac etiology. Neurology consulted for further evaluation/management.    Relevant History per Chart Review:  MG Type: Generalized, seronegative  Onset Date: 2008 or 2009  Onset Symptoms: intermittent generalized weakness, fatigability, intermittent double vision.  Immunosuppressant Medications: methotrexate currently, IVIG, prednisone 10 mg daily  Pyridostigmine: 60 mg every 3.5 to 4 hours; takes Mestinon 180 ER at night time only.  IVIG: yes  MG Crises: Yes  MG Exacerbations: Yes  Hospitalizations for MG: Yes  CT Scan: history of negative CT Chest  Comorbidities including malignancy and diabetes history: history of anorexia  Single Fiber: No  Rep Stim:  No  Labs: routine labs done  Failed Medications: azathioprine  Current symptoms: double vision, fatigue, extremity weakness    She follows outpatient with Dr. Shipley (last seen on 6/3/2024). At that time she was complaining of diplopia, generalized fatigue, dyspnea on exertion, generalized weakness, dysphagia and hoarseness. Exam notable for horizontal diplopia with glasses off, Plan was to continue current regimen as below and apply for insurance approval for [employer].    Her current myasthenia regimen is:  - 10mg prednisone daily (cannot increase because of her chronic wei of bulimia- she has an eating disorder and her perceptions of her weight plays on her mind. This cannot be increased due to her fear of gaining weight)  - IVIG 1.5g/kg every 3 weeks (last dose 5/22/2024)  - Methotrexate 20mg weekly (side effects of taste change and mouth painful)  - Pyridostigmine ER 180mg qHS    She notably does have iron-deficiency anemia for " which she has been referred to GI for scope. She is on iron supplements PO but has been recommended IV iron which has not been started.     Last MG exacerbation was in 2021 for which she did 3 days of IVIG. She had an exacerbation in 2019 for which she got 4 days of PLEX.    History of Present Illness:   History obtained from interview with patient. Patient notes that over the past month, she has been having shortness of breath on exertion that has progressively gotten worse, limiting her exercise tolerance. It is now to the point that she gets short of breath with walking to the bathroom or even with prolonged talking. She is also c/o chest pain, described as substernal chest pressure without radiation. Of note, she had another episode of chest pain a week ago when she was shopping, described as severe substernal chest pressure, that resolved without intervention. Pt also notes generalized weakness, that feels worse than her baseline. She also endorses generalized fatigue at baseline, that has worsened over the past few weeks. She also notes intermittent numbness/tingling affecting her fingers and around the mouth. Otherwise, reports that her double vision and hoarseness is at baseline. She denies any dysphagia, neck weakness, fever/chills, URI/UTI symptoms, bloody stools, nausea/vomiting.     ROS: All systems reviewed and were negative except as above    Home Medications:    Current Outpatient Medications   Medication Instructions    0.9 % sodium chloride (sodium chloride 0.9%) solution As directed.    Adrenalin 1 mg, As directed.<BR>    cephalexin (Keflex) 500 mg capsule 1 capsule, oral, 3 times daily    cyanocobalamin (Vitamin B-12) 500 mcg tablet 1 tablet, oral, Daily    diphenhydrAMINE (BENADRYL) 50 mg, As directed.<BR>    DULoxetine (Cymbalta) 60 mg DR capsule 1 capsule, oral, Daily    fluconazole (Diflucan) 100 mg tablet Take 2 tablets by mouth today and then 1 tablet every morninig for 14 days    folic acid  (FOLVITE) 1 mg, oral, Daily    gabapentin (NEURONTIN) 300 mg, oral, Nightly    methotrexate (TREXALL) 20 mg, oral, Once Weekly    mupirocin (Bactroban) 2 % ointment 1 Application, Topical, 3 times daily RT    pantoprazole (ProtoNix) 40 mg EC tablet 1 tablet, oral, Daily    predniSONE (Deltasone) 10 mg tablet 1 tablet, oral, Daily    predniSONE (DELTASONE) 15 mg, oral, Daily    pyridostigmine (Mestinon) 60 mg tablet 1 tablet, oral, 4 times daily    pyridostigmine (MESTINON) 180 mg, oral, Nightly    traZODone (Desyrel) 50 mg tablet TAKE 1/2 TO 1 TABS AT BEDTIME     Past Medical History:    has no past medical history on file.    Past Surgical History:    has a past surgical history that includes Hysterectomy (07/26/2017); IR CVC tunneled (5/5/2020); MR angio head wo IV contrast (2/16/2023); MR angio neck wo IV contrast (2/16/2023); and CT angio head w and wo IV contrast (3/17/2023).    Allergies:   No Known Allergies    Family History:   Family History   Problem Relation Name Age of Onset    Cancer Mother      Cancer Father      Cancer Sister      Cancer Brother         Past Social History:   Social History     Tobacco Use    Smoking status: Never    Smokeless tobacco: Never   Substance Use Topics    Alcohol use: Yes     Comment: socially    Drug use: Never       Vitals:   Temperature:  [36.6 °C (97.9 °F)] 36.6 °C (97.9 °F)  Heart Rate:  [80] 80  Respirations:  [16] 16  BP: (154)/(95) 154/95    Physical Exam:   MG-specific exam:  Eyelid ptosis noted on sustained upgaze starting at 30 seconds  Single breath count: 19, 22  Neck flexion: 5-/5  Neck extension: 4/5  NIF: -30, -35    GENERAL APPEARANCE:  No distress, alert, interactive and cooperative.     CARDIOVASCULAR: Regular rate and rhythm. Radial pulses +2 and equal. No swelling, varicosities, edema, or tenderness to palpation.      MENTAL STATE:   Orientation was normal to time, place and person. Language testing was normal for comprehension, repetition, expression,  and naming. Able to follow complex commands across midline.    OPHTHALMOSCOPIC:   The ophthalmoscopic exam was deferred.    CRANIAL NERVES:   CN 2   Visual fields full to confrontation.   CN 3, 4, 6   Pupils round, 4 mm in diameter, equally reactive to light. Lids symmetric; no ptosis at baseline. EOMs normal alignment, full range with normal saccades, pursuit and convergence. No nystagmus.   CN 5   Facial sensation intact bilaterally.   CN 7   Normal and symmetric facial strength. Nasolabial folds symmetric.   CN 8   Hearing intact to conversation.  CN 9/10  Palate elevates symmetrically.   CN 11   Normal strength of shoulder shrug and neck turning.   CN 12   Tongue midline, with normal bulk and strength; no fasciculations.     MOTOR:   Muscle bulk and tone were normal in both upper and lower extremities.   No fasciculations, tremor or other abnormal movements were present.                                  R          L  Shoulder abd       5          4+  Elbow flex.           5-         5-  Elbow ext.            5           5        5      5    Hip flex.              5-         5-  Knee flex.            5          5  Knee ext.             5          5  Dorsiflex             5          5  Plantarflex         5          5    REFLEXES:                       R          L  Biceps:         3          3  Triceps:        3          3  Knee:           3          3  Ankle:          2          2    Babinski: toes downgoing to plantar stimulation. No clonus or other pathologic reflexes present.     SENSORY:   In both upper and lower extremities, sensation was intact to light touch and pin-prick.    COORDINATION:    In both upper extremities, finger-nose-finger was intact without dysmetria or overshoot.   In both lower extremities, heel-to-shin was intact.      GAIT:   Testing was deferred for pt safety.     Labs:   Troponin 16  BNP 15  Glucose 90  RFP unremarkable    Imaging:  No MRI head results found for the past 14  "days  No CT head results found for the past 14 days      Assessment:  Page Huston is a 69 y.o. female with a PMHx of seronegative myasthenia gravis (follows with Dr. Shipley), iron deficiency anemia (pending endoscopy with GI to evaluate for bleed) who presents to [facility] for worsening dyspnea on exertion, chest pain and reported \"abnormal EKG\" c/f MG exacerbation vs cardiac etiology. Neurology consulted for further evaluation/management. Pt c/o progressive dyspnea on exertion, substernal chest pain and worsened generalized fatigue/weakness as well as intermittent numbness/tingling. Exam notable for eyelid ptosis on sustained upgaze, single breath count ~20, NIF -30, mild proximal weakness, mild neck flex/ext weakness. Despite normal trop/EKG, pt's exertional dyspnea and chest pain c/f cardiac etiology vs related to her iron deficiency anemia. However, given presence of non-specific symptoms of generalized fatigue/weakness, cannot definitively rule out MG exacerbation/flare. Would recommend completing cardiac workup per ED. If all workup negative for acute abnormalities then, will plan to admit to Neurology service for treatment of presumed MG exacerbation, likely with PLEX x5 days.     Recommendations:  - Cardiac workup per ED  - C/w pyridostigmine ER 180mg qHS  - If all workup negative for acute abnormalities, will plan to admit to neurology for treatment of MG exacerbation, likely with PLEX x5 days  - Avoid all meds listed below due to risk of exacerbating MG    Myasthenia Gravis MEDS TO AVOID:  1. Absolute contraindications (are life-threatening)             Curare              D-penicillamine             Botulinum toxin- Botox             Interferon alpha  *               Neuromuscular paralytic agents used in general anesthesia such as succinycholine, rocuronium, vecuronium, etc.    2. Contraindications (should be avoided)             Antibiotics-  o          Aminoglycosides- Gentamycin, Kanamycin, Amikacin, " Neomycin, Streptomycin,      Tobramycin, Netilmycin, Paromomycin, spectinomycin,      Vancomycin  o          Macrolides- Azithromycin (Z-pack), Erythromycin, Clarithromycin      (Biaxin), Telithromycin   o          Fluoroquinolones Ciprofloxacin (Cipro), Norfloxacin, Levofloxacin (Levaquin)                Anti-malarials- Chloroquine, hydroxychloroquine (Plaquinal)             Anti-Fungals- Voriconazole             Anti-arrhythmics- Quinidine, Procainamide, Etafenone, Peruvoside             Magnesium- Oral tablets, IV magnesium replacement.    3. Use with Caution- may exacerbate weakness in some myasthenics             Antihypertensives  o          Calcium channel blockers- Verapamil, Nifedipine, Felodipine   o          Beta blockers- Propanalol, Atenolol, Acebutolol, Practolol, Oxprenolol, Sotalol,   Nadolol, and Ophthalmic Timolol             Lithium      This patient will be formally staffed with the attending, Dr. Magdalena Jamison, in the morning.    Demetrio Dailey MD  PGY-2, Neurology

## 2024-06-06 NOTE — CONSULTS
Reason for consult:   Therapeutic plasma exchange (TPE) for Myasthenia gravis - Acute, short-term treatment (ASFA 2023: Category I, Grade 1B).    HPI:   Page Huston is a 69 y.o. female with a PMHx of seronegative myasthenia gravis who presented to [facility] for worsening dyspnea on exertion, chest pain and abnormal EKG. She also developed symptoms of ptosis and hoarseness of voice. Cardiac up work up was negative.     Transfusion Medicine was consulted for a series of TPE procedures to help with further management.    Past medical history:   History reviewed. No pertinent past medical history.     Past surgical history:  Past Surgical History:   Procedure Laterality Date    CT HEAD ANGIO W AND WO IV CONTRAST  3/17/2023    CT HEAD ANGIO W AND WO IV CONTRAST POR [id number] CT    HYSTERECTOMY  07/26/2017    Hysterectomy    IR CVC TUNNELED  5/5/2020    IR CVC TUNNELED 5/5/2020 [facility] CLINICAL LEGACY    MR HEAD ANGIO WO IV CONTRAST  2/16/2023    MR HEAD ANGIO WO IV CONTRAST POR [facility] MR [facility]BILE    MR NECK ANGIO WO IV CONTRAST  2/16/2023    MR NECK ANGIO WO IV CONTRAST POR CALLIE MR MOBILE        Allergies:   No Known Allergies    ROS (limited):  ROS per chart.    Medications:    Current Facility-Administered Medications:     acetaminophen (Tylenol) tablet 650 mg, 650 mg, oral, q6h PRN, Kristi Sánchez MD, 650 mg at 06/06/24 0955    cholecalciferol (Vitamin D-3) tablet 5,000 Units, 5,000 Units, oral, Daily, Kristi Sánchez MD, 5,000 Units at 06/06/24 0955    docusate sodium (Colace) capsule 100 mg, 100 mg, oral, BID, Severine L Kako, MD, 100 mg at 06/06/24 0955    DULoxetine (Cymbalta) DR capsule 60 mg, 60 mg, oral, Daily, Kristi Sánchez MD, 60 mg at 06/06/24 0955    enoxaparin (Lovenox) syringe 40 mg, 40 mg, subcutaneous, q24h, Severine L Kako, MD, 40 mg at 06/06/24 0955    folic acid (Folvite) tablet 1 mg, 1 mg, oral, Daily, Severine L Kako, MD, 1 mg at 06/06/24 0955    gabapentin (Neurontin) capsule 300 mg, 300 mg, oral, Nightly,  "Severine L Kako, MD    methotrexate (Trexall) tablet 20 mg, 20 mg, oral, Every Sunday, Kristi Sánchez MD, 20 mg at 06/06/24 1059    pantoprazole (ProtoNix) EC tablet 40 mg, 40 mg, oral, Daily, Severine L Kako, MD, 40 mg at 06/06/24 0955    polyethylene glycol (Glycolax, Miralax) packet 17 g, 17 g, oral, Daily, Severine L Kako, MD    predniSONE (Deltasone) tablet 10 mg, 10 mg, oral, Daily, Kristi Sánchez MD, 10 mg at 06/06/24 1004    pyridostigmine (Mestinon) ER tablet 180 mg, 180 mg, oral, Nightly, Severine L Kako, MD    pyridostigmine (Mestinon) tablet 60 mg, 60 mg, oral, q4h, Kristi Sánchez MD, 60 mg at 06/06/24 1313    traZODone (Desyrel) tablet 25 mg, 25 mg, oral, Nightly PRN, Severine L Kako, MD     Vitals:  Visit Vitals  BP (!) 148/108   Pulse 65   Temp 36.2 °C (97.2 °F) (Temporal)   Resp 20   Ht 1.6 m (5' 3\")   Wt 59.9 kg (132 lb)   SpO2 98%   BMI 23.38 kg/m²   Smoking Status Never   BSA 1.63 m²        Labs:  WBC   Date/Time Value Ref Range Status   06/06/2024 03:48 AM 6.4 4.4 - 11.3 x10*3/uL Final     Hemoglobin   Date/Time Value Ref Range Status   06/06/2024 03:48 AM 9.8 (L) 12.0 - 16.0 g/dL Final     Hematocrit   Date/Time Value Ref Range Status   06/06/2024 03:48 AM 29.4 (L) 36.0 - 46.0 % Final     Platelets   Date/Time Value Ref Range Status   06/06/2024 03:48  150 - 450 x10*3/uL Final        Protime   Date/Time Value Ref Range Status   06/06/2024 06:19 AM 11.4 9.8 - 12.8 seconds Final     INR   Date/Time Value Ref Range Status   06/06/2024 06:19 AM 1.0 0.9 - 1.1 Final     aPTT   Date/Time Value Ref Range Status   06/06/2024 06:19 AM 27 27 - 38 seconds Final     Fibrinogen   Date/Time Value Ref Range Status   06/06/2024 06:19  200 - 400 mg/dL Final        POCT Calcium, Ionized   Date/Time Value Ref Range Status   06/06/2024 06:19 AM 1.02 (L) 1.1 - 1.33 mmol/L Final     Comment:     The performance characteristics of ionized calcium tested  in heparinized plasma or serum have been validated by " the  individual  laboratory site where testing is performed.   Testing on heparinized plasma or serum is not approved by   the FDA; however, such approval is not necessary.        Assessment/Plan:  69 y.o. female with Myasthenia gravis - Acute, short-term treatment (ASFA 2023: Category I, Grade 1B)  1. Explained the procedure and obtained consent from the patient.  2. Vascular access to be placed and maintained by clinical team.  3. First TPE (3 total) tentatively scheduled for 6/6/2024.

## 2024-06-06 NOTE — SIGNIFICANT EVENT
Sent in by PCP/neurology. Hx of myasthenia gravis, having CP and SOB, neuro asked to be paged when roomed for potential MG crisis.

## 2024-06-06 NOTE — ED TRIAGE NOTES
Patient reports chest pain and shortness of breath on exertion for 3 weeks. Patient was seen at her PCP today who sent her to the ED for abnormal EKG.

## 2024-06-06 NOTE — ED PROVIDER NOTES
CC: Chest Pain     History provided by: Patient  Limitations to History: None    HPI:  Patient is a 69-year-old female with history of myasthenia gravis on prednisone, iron deficiency anemia who presents with chest pain.  She also endorses shortness of breath on exertion for 3 weeks.  Patient was seen by her PCP today and sent to the ED for abnormal EKG.  Additionally, there is concern for myasthenic crisis and neurology is aware and they will evaluate patient in the ED. my evaluation, patient endorses a pressure-like feeling on top of her chest.  She denies any radiating chest pain to her back or her abdomen.  She denies history of MI, hypertension, diabetes.  She states she has had progressively worsening chest pain for the past few weeks and shortness of breath and now has some conversational dyspnea.  She also endorses weakness in her hands and numbness around her mouth.  She has double vision consistent with her myasthenia.  She notes some difficulty swallowing.  She denies history of blood clots, malignancy, fevers, chills, leg swelling, blood thinner use.  Patient states her PCP said she had T wave inversions in 3 leads in her EKG.    External Records Reviewed:  I reviewed prior ED visits, Care Everywhere, discharge summaries and outpatient records as appropriate.   ???????????????????????????????????????????????????????????????  Triage Vitals:  T 36.6 °C (97.9 °F)  HR 80  BP (!) 154/95  RR 16  O2 100 % None (Room air)    Physical Exam  Vitals and nursing note reviewed.   Constitutional:       General: She is not in acute distress.     Appearance: Normal appearance.   HENT:      Head: Normocephalic and atraumatic.   Eyes:      Conjunctiva/sclera: Conjunctivae normal.   Cardiovascular:      Rate and Rhythm: Normal rate and regular rhythm.   Pulmonary:      Effort: Pulmonary effort is normal. No respiratory distress.      Breath sounds: Normal breath sounds.   Abdominal:      General: Abdomen is flat.       Palpations: Abdomen is soft.   Musculoskeletal:         General: Normal range of motion.      Cervical back: Normal range of motion and neck supple.   Skin:     General: Skin is warm and dry.   Neurological:      General: No focal deficit present.      Mental Status: She is alert and oriented to person, place, and time. Mental status is at baseline.      GCS: GCS eye subscore is 4. GCS verbal subscore is 5. GCS motor subscore is 6.      Cranial Nerves: Cranial nerves 2-12 are intact.      Motor: Motor function is intact.      Coordination: Coordination is intact.      Gait: Gait is intact.      Comments: Perioral numbness, 4-5 strength bilateral handgrip, no pronator drift, 5 out of 5 strength with plantarflexion no ptosis, extraocular movements intact   Psychiatric:         Mood and Affect: Mood normal.         Behavior: Behavior normal.        ???????????????????????????????????????????????????????????????  ED Course/Treatment/Medical Decision Making  MDM:  Patient is a 69-year-old female who presents with chest pain shortness of breath.  Differential diagnoses are broad and include ACS, PE, DVT, myasthenia crisis.  No signs of fluid overload on examination.  Overall, neurologic exam is unremarkable.  She has subjective numbness around her mouth, strength is 4 out of 5 in bilateral hands, ambulatory, no facial droop, ptosis or slurred speech.  Neurology at bedside evaluating patient as well.  Patient given 324 mg of aspirin for chest pain.  She has not had a cardiac workup in the past.  Patient has a low risk Wells score and I will obtain D-dimer.  I consulted respiratory therapy for NIF.      ED Course:  ED Course as of 06/06/24 0536   Thu Jun 06, 2024   0019 EKG reviewed normal sinus rhythm rate 69, DE interval 1 and 56 ms, QRS 78 ms, QTc 430 ms, no acute ST segment elevations or depressions, normal axis, appears similar to prior [SA]   0158 D-dimer ordered and patient given 324mg of aspirin. NIF wnl at -35 [SA]    0434 D-dimer elevated will follow up with CT PE  [SA]   0434 CBC, troponin, BNP overall wnl there is anemia of 9.8 however no signs of overt bleeding [SA]   0436 Plan for admission to medicine vs neurology for MG exacerbation and cardiac risk stratification, patient agreeable [SA]   0536 CT PE preliminary read negative, accepted by neurology [SA]      ED Course User Index  [SA] Alexandra Melissa DO         Diagnoses as of 06/06/24 0536   Chest pain, unspecified type       EKG Interpretation:  See ED Course/Below:    Independent Interpretation of Studies:  I independently interpreted labs/imaging as stated in ED Course or below.    Differential diagnoses considered include but are not limited to: See MDM/Below:    Social Determinants Limiting Care:  None identified    Discussion of Management with Other Providers: See MDM/Below:    Disposition:  Admitted    Alexandra Melissa, EM, PGY-2    I reviewed the case with the attending ED physician. The attending ED physician agrees with the plan. Patient and/or patient´s representative was counseled regarding labs, imaging, likely diagnosis, and plan. All questions were answered.    Disclaimer: This note was dictated by speech recognition.  Attempt at proofreading was made to minimize errors.  Errors in transcription may be present.  Please call if questions.    Procedures ? Crispy Gamer last updated 6/6/2024 5:36 AM        Alexandra Melissa DO  Resident  06/06/24 0536

## 2024-06-06 NOTE — PROGRESS NOTES
06/06/24 1012   Discharge Planning   Living Arrangements Alone   Assistance Needed None   Type of Residence Private residence   Number of Stairs to Enter Residence 0   Number of Stairs Within Residence 0   Do you have animals or pets at home? Yes   Type of Animals or Pets 1 dog   Who is requesting discharge planning? Provider   Home or Post Acute Services None   Patient expects to be discharged to: Home     Met with patient and introduced myself as Care Coordinator and member of the discharge planning team.  Patient was admitted for further evaluation of complaints of chest pain. She has a history of Myasthenia Gravis. She plans to discharge to home. She is independent in ADL's. She does not use an assistive device. Her PCP is Dr. Dickinson. She uses [facility]. No home care needs were identified. Care Coordinator will continue to follow for home going needs. Palma Gong RN

## 2024-06-06 NOTE — H&P
"GENERAL NEUROLOGY H&P  *Copied from initial consult note     Page Huston is a 69 y.o. female with a PMHx of seronegative myasthenia gravis (follows with Dr. Shipley), iron deficiency anemia (pending endoscopy with GI to evaluate for bleed) who presents to [facility] for worsening dyspnea on exertion, chest pain and reported \"abnormal EKG\" c/f MG exacerbation vs cardiac etiology. Neurology consulted for further evaluation/management.     Relevant History per Chart Review:  MG Type: Generalized, seronegative  Onset Date: 2008 or 2009  Onset Symptoms: intermittent generalized weakness, fatigability, intermittent double vision.  Immunosuppressant Medications: methotrexate currently, IVIG, prednisone 10 mg daily  Pyridostigmine: 60 mg every 3.5 to 4 hours; takes Mestinon 180 ER at night time only.  IVIG: yes  MG Crises: Yes  MG Exacerbations: Yes  Hospitalizations for MG: Yes  CT Scan: history of negative CT Chest  Comorbidities including malignancy and diabetes history: history of anorexia  Single Fiber: No  Rep Stim:  No  Labs: routine labs done  Failed Medications: azathioprine  Current symptoms: double vision, fatigue, extremity weakness     She follows outpatient with Dr. Shipley (last seen on 6/3/2024). At that time she was complaining of diplopia, generalized fatigue, dyspnea on exertion, generalized weakness, dysphagia and hoarseness. Exam notable for horizontal diplopia with glasses off, Plan was to continue current regimen as below and apply for insurance approval for [employer].     Her current myasthenia regimen is:  - 10mg prednisone daily (cannot increase because of her chronic wei of bulimia- she has an eating disorder and her perceptions of her weight plays on her mind. This cannot be increased due to her fear of gaining weight)  - IVIG 1.5g/kg every 3 weeks (last dose 5/22/2024)  - Methotrexate 20mg weekly (side effects of taste change and mouth painful)  - Pyridostigmine ER 180mg qHS     She notably does have " iron-deficiency anemia for which she has been referred to GI for scope. She is on iron supplements PO but has been recommended IV iron which has not been started.      Last MG exacerbation was in 2021 for which she did 3 days of IVIG. She had an exacerbation in 2019 for which she got 4 days of PLEX.     History of Present Illness:   History obtained from interview with patient. Patient notes that over the past month, she has been having shortness of breath on exertion that has progressively gotten worse, limiting her exercise tolerance. It is now to the point that she gets short of breath with walking to the bathroom or even with prolonged talking. She is also c/o chest pain, described as substernal chest pressure without radiation. Of note, she had another episode of chest pain a week ago when she was shopping, described as severe substernal chest pressure, that resolved without intervention. Pt also notes generalized weakness, that feels worse than her baseline. She also endorses generalized fatigue at baseline, that has worsened over the past few weeks. She also notes intermittent numbness/tingling affecting her fingers and around the mouth. Otherwise, reports that her double vision and hoarseness is at baseline. She denies any dysphagia, neck weakness, fever/chills, URI/UTI symptoms, bloody stools, nausea/vomiting.      ROS: All systems reviewed and were negative except as above     Home Medications:         Current Outpatient Medications   Medication Instructions    0.9 % sodium chloride (sodium chloride 0.9%) solution As directed.    Adrenalin 1 mg, As directed.<BR>    cephalexin (Keflex) 500 mg capsule 1 capsule, oral, 3 times daily    cyanocobalamin (Vitamin B-12) 500 mcg tablet 1 tablet, oral, Daily    diphenhydrAMINE (BENADRYL) 50 mg, As directed.<BR>    DULoxetine (Cymbalta) 60 mg DR capsule 1 capsule, oral, Daily    fluconazole (Diflucan) 100 mg tablet Take 2 tablets by mouth today and then 1 tablet  every morninig for 14 days    folic acid (FOLVITE) 1 mg, oral, Daily    gabapentin (NEURONTIN) 300 mg, oral, Nightly    methotrexate (TREXALL) 20 mg, oral, Once Weekly    mupirocin (Bactroban) 2 % ointment 1 Application, Topical, 3 times daily RT    pantoprazole (ProtoNix) 40 mg EC tablet 1 tablet, oral, Daily    predniSONE (Deltasone) 10 mg tablet 1 tablet, oral, Daily    predniSONE (DELTASONE) 15 mg, oral, Daily    pyridostigmine (Mestinon) 60 mg tablet 1 tablet, oral, 4 times daily    pyridostigmine (MESTINON) 180 mg, oral, Nightly    traZODone (Desyrel) 50 mg tablet TAKE 1/2 TO 1 TABS AT BEDTIME      Past Medical History:    has no past medical history on file.     Past Surgical History:    has a past surgical history that includes Hysterectomy (07/26/2017); IR CVC tunneled (5/5/2020); MR angio head wo IV contrast (2/16/2023); MR angio neck wo IV contrast (2/16/2023); and CT angio head w and wo IV contrast (3/17/2023).     Allergies:   No Known Allergies     Family History:   Family History          Family History   Problem Relation Name Age of Onset    Cancer Mother        Cancer Father        Cancer Sister        Cancer Brother                Past Social History:   Social History            Tobacco Use    Smoking status: Never    Smokeless tobacco: Never   Substance Use Topics    Alcohol use: Yes       Comment: socially    Drug use: Never         Vitals:   Temperature:  [36.6 °C (97.9 °F)] 36.6 °C (97.9 °F)  Heart Rate:  [80] 80  Respirations:  [16] 16  BP: (154)/(95) 154/95     Physical Exam:   MG-specific exam:  Eyelid ptosis noted on sustained upgaze starting at 30 seconds  Single breath count: 19, 22  Neck flexion: 5-/5  Neck extension: 4/5  NIF: -30, -35     GENERAL APPEARANCE:  No distress, alert, interactive and cooperative.      CARDIOVASCULAR: Regular rate and rhythm. Radial pulses +2 and equal. No swelling, varicosities, edema, or tenderness to palpation.      MENTAL STATE:   Orientation was normal  to time, place and person. Language testing was normal for comprehension, repetition, expression, and naming. Able to follow complex commands across midline.     OPHTHALMOSCOPIC:   The ophthalmoscopic exam was deferred.     CRANIAL NERVES:   CN 2      Visual fields full to confrontation.   CN 3, 4, 6   Pupils round, 4 mm in diameter, equally reactive to light. Lids symmetric; no ptosis at baseline. EOMs normal alignment, full range with normal saccades, pursuit and convergence. No nystagmus.   CN 5   Facial sensation intact bilaterally.   CN 7   Normal and symmetric facial strength. Nasolabial folds symmetric.   CN 8   Hearing intact to conversation.  CN 9/10  Palate elevates symmetrically.   CN 11   Normal strength of shoulder shrug and neck turning.   CN 12   Tongue midline, with normal bulk and strength; no fasciculations.      MOTOR:   Muscle bulk and tone were normal in both upper and lower extremities.   No fasciculations, tremor or other abnormal movements were present.                                   R          L  Shoulder abd       5          4+  Elbow flex.           5-         5-  Elbow ext.            5           5                        5           5     Hip flex.              5-         5-  Knee flex.            5          5  Knee ext.             5          5  Dorsiflex             5          5  Plantarflex         5          5     REFLEXES:                       R          L  Biceps:         3          3  Triceps:        3          3  Knee:           3          3  Ankle:          2          2     Babinski: toes downgoing to plantar stimulation. No clonus or other pathologic reflexes present.      SENSORY:   In both upper and lower extremities, sensation was intact to light touch and pin-prick.     COORDINATION:    In both upper extremities, finger-nose-finger was intact without dysmetria or overshoot.   In both lower extremities, heel-to-shin was intact.       GAIT:   Testing was deferred for  "pt safety.      Labs:   Troponin 16  BNP 15  Glucose 90  RFP unremarkable     Imaging:  No MRI head results found for the past 14 days  No CT head results found for the past 14 days        Assessment:  Page Huston is a 69 y.o. female with a PMHx of seronegative myasthenia gravis (follows with Dr. Shipley), iron deficiency anemia (pending endoscopy with GI to evaluate for bleed) who presents to [facility] for worsening dyspnea on exertion, chest pain and reported \"abnormal EKG\" c/f MG exacerbation vs cardiac etiology. Neurology consulted for further evaluation/management. Pt c/o progressive dyspnea on exertion, substernal chest pain and worsened generalized fatigue/weakness as well as intermittent numbness/tingling. Exam notable for eyelid ptosis on sustained upgaze, single breath count ~20, NIF -30, mild proximal weakness, mild neck flex/ext weakness. Despite normal trop/EKG, pt's exertional dyspnea and chest pain c/f cardiac etiology vs related to her iron deficiency anemia. However, given presence of non-specific symptoms of generalized fatigue/weakness, cannot definitively rule out MG exacerbation/flare. Will admit to Neurology service for further workup and consideration of PLEX.     Plan:  #Progressive exertional dyspnea   #C/f MG exacerbation  ::Trop 16 > 14, BNP 15  ::D-dimer 1426 but CTPE negative for PE  - TTE to evaluate for cardiac etiology   - Consider cards consult given exertional dyspnea and chest pain  - Consider IR consult for line placement to initiate PLEX   - Ordered pre-PLEX labs: coags, ionized calcium, fibrinogen  - NIF/VC q6h  - C/w home mestinon IR 60q4 and ER 180mg at bedtime  - C/w home methotrexate 20mg qweek and prednisone 10mg  - C/w home gabapentin 300mg at bedtime  - Holding duloxetine given unsure if pt taking, will need to clarify    #Iron deficiency anemia  - C/w home folic acid 1mg  - Daily CBC to trend hgb  - Monitor for s/s bleeding    #Ulcer ppx  - C/w home pantoprazole 40mg   "   #Insomnia   - C/w home trazodone 25mg prn qHS    Myasthenia Gravis MEDS TO AVOID:  1. Absolute contraindications (are life-threatening)             Curare              D-penicillamine             Botulinum toxin- Botox             Interferon alpha  *               Neuromuscular paralytic agents used in general anesthesia such as succinycholine, rocuronium, vecuronium, etc.     2. Contraindications (should be avoided)             Antibiotics-  o          Aminoglycosides- Gentamycin, Kanamycin, Amikacin, Neomycin, Streptomycin,      Tobramycin, Netilmycin, Paromomycin, spectinomycin,      Vancomycin  o          Macrolides- Azithromycin (Z-pack), Erythromycin, Clarithromycin      (Biaxin), Telithromycin   o          Fluoroquinolones Ciprofloxacin (Cipro), Norfloxacin, Levofloxacin (Levaquin)                Anti-malarials- Chloroquine, hydroxychloroquine (Plaquinal)             Anti-Fungals- Voriconazole             Anti-arrhythmics- Quinidine, Procainamide, Etafenone, Peruvoside             Magnesium- Oral tablets, IV magnesium replacement.     3. Use with Caution- may exacerbate weakness in some myasthenics             Antihypertensives  o          Calcium channel blockers- Verapamil, Nifedipine, Felodipine   o          Beta blockers- Propanalol, Atenolol, Acebutolol, Practolol, Oxprenolol, Sotalol,   Nadolol, and Ophthalmic Timolol             Lithium      This patient will be formally staffed with the attending, Dr. Magdalena Jamison, in the morning.     Demetrio Dailey MD  PGY-2, Neurology

## 2024-06-06 NOTE — PRE-PROCEDURE NOTE
INTERVENTIONAL RADIOLOGY PRE-PROCEDURE NOTE    Page Huston is a 69 y.o. female with PMHx of myasthenia gravis who presents to the interventional radiology department for placement of trialysis line.    Procedure: Placement of internal jugular trialysis catheter.    Indication for procedure: The encounter diagnosis was Chest pain, unspecified type.    History reviewed. No pertinent past medical history.   Past Surgical History:   Procedure Laterality Date    CT HEAD ANGIO W AND WO IV CONTRAST  3/17/2023    CT HEAD ANGIO W AND WO IV CONTRAST POR [id number] CT    HYSTERECTOMY  07/26/2017    Hysterectomy    IR CVC TUNNELED  5/5/2020    IR CVC TUNNELED 5/5/2020 [facility]    MR HEAD ANGIO WO IV CONTRAST  2/16/2023    MR HEAD ANGIO WO IV CONTRAST POR [facility]    MR NECK ANGIO WO IV CONTRAST  2/16/2023    MR NECK ANGIO WO IV CONTRAST POR [facility]       Relevant Labs:   Lab Results   Component Value Date    CREATININE 0.76 06/05/2024    EGFR 84 06/05/2024    INR 1.0 06/06/2024    PROTIME 11.4 06/06/2024       Planned Sedation/Anesthesia: None- Patient had a small amount of food this morning. Fentanyl only.     Directed physical examination:    General: Normal appearance, behavior, cognition and NAD  Heart: Heart regular rate and rhythm  Lungs: No increased work of breathing  Neurologic: Left ptosis       Current Facility-Administered Medications:     acetaminophen (Tylenol) tablet 650 mg, 650 mg, oral, q6h PRN, Kristi Sánchez MD, 650 mg at 06/06/24 0955    calcium carbonate (Tums) chewable tablet 1,500 mg, 1,500 mg, oral, q5 min PRN, Yonathan Cameron MD    calcium gluconate in NS apheresis IV 4,223 mg, 4,223 mg, intravenous, Once, Yonathan Cameron MD    cholecalciferol (Vitamin D-3) tablet 5,000 Units, 5,000 Units, oral, Daily, Kristi Sánchez MD, 5,000 Units at 06/06/24 0955    docusate sodium (Colace) capsule 100 mg, 100 mg, oral, BID, Severine L Kako, MD, 100 mg at 06/06/24 0955    DULoxetine (Cymbalta)   capsule 60 mg, 60 mg, oral, Daily, Kristi Sánchez MD, 60 mg at 06/06/24 0955    enoxaparin (Lovenox) syringe 40 mg, 40 mg, subcutaneous, q24h, Severine L Kako, MD, 40 mg at 06/06/24 0955    [START ON 6/7/2024] ferrous sulfate (325 mg ferrous sulfate) tablet 1 tablet, 65 mg of iron, oral, Daily with breakfast, Kristi Sánchez MD    folic acid (Folvite) tablet 1 mg, 1 mg, oral, Daily, Severine L Kako, MD, 1 mg at 06/06/24 0955    gabapentin (Neurontin) capsule 300 mg, 300 mg, oral, Nightly, Severine L Kako, MD    heparin 1,000 unit/mL injection 1,000 Units, 1,000 Units, intravenous, Once, Yonathan Cameron MD    heparin 1,000 unit/mL injection 1,000 Units, 1,000 Units, intravenous, Once, Yonathan Cameron MD    methotrexate (Trexall) tablet 20 mg, 20 mg, oral, Every Sunday, Kristi Sánchez MD, 20 mg at 06/06/24 1059    pantoprazole (ProtoNix) EC tablet 40 mg, 40 mg, oral, Daily, Severine L Kako, MD, 40 mg at 06/06/24 0955    plasma exchange albumin human 5 % bottle 12.5 g, 12.5 g, apheresis (aphr), q15 min, Yonathan Cameron MD    polyethylene glycol (Glycolax, Miralax) packet 17 g, 17 g, oral, Daily, Severine L Kako, MD    predniSONE (Deltasone) tablet 10 mg, 10 mg, oral, Daily, Kristi Sánchez MD, 10 mg at 06/06/24 1004    pyridostigmine (Mestinon) ER tablet 180 mg, 180 mg, oral, Nightly, Severine L Kako, MD    pyridostigmine (Mestinon) tablet 60 mg, 60 mg, oral, q4h, Kristi Sánchez MD, 60 mg at 06/06/24 1629    sodium chloride 0.9 % bolus 500 mL, 500 mL, intravenous, Once PRN, Yonathan Cameron MD    traZODone (Desyrel) tablet 25 mg, 25 mg, oral, Nightly PRN, Severine L Kako, MD     Mallampati: II (hard and soft palate, upper portion of tonsils anduvula visible)    ASA Score: ASA 2 - Patient with mild systemic disease with no functional limitations    Benefits, risks and alternatives of procedure and planned sedation have been discussed with the patient and/or their representative. All questions answered and they agree to proceed.      Dylan Scales MD, PGY-3    NON-Urgent on call weekends and after hours weekdays (5pm - 5am) IR pager: [phone]  Urgent & emergent on call weekends and after hours weekdays (5pm-7am) IR pager: [phone]

## 2024-06-06 NOTE — POST-PROCEDURE NOTE
Interventional Radiology Brief Postprocedure Note    Attending: Parveen Rahman MD    Assistant:   Staff Role   Parveen Rahman MD Radiologist   Jaky Negron, RN Radiology Nurse   Racquel Shin Radiology Technologist       Diagnosis:   1. Chest pain, unspecified type  Transthoracic Echo (TTE) Complete    Transthoracic Echo (TTE) Complete          Description of procedure:Right internal jugular vein trialysis catheter, ready to use.    Timeout:  Yes    Procedure Area: Procedure Area     Anesthesia:   Local/Topical    Complications: None    Estimated Blood Loss: minimal    Medications (Filter: Administrations occurring from 1532 to 1532 on 06/06/24) As of 06/06/24 1532      None          No specimens collected      See detailed result report with images in PACS.    The patient tolerated the procedure well without incident or complication and is in stable condition.

## 2024-06-06 NOTE — CONSULTS
Inpatient consult to Cardiology  Consult performed by: Melisa Daigle MD  Consult ordered by: Magdalena Jamison DO  Reason for consult: Abnormal EKG with chest pain and exertional shortness of breath.      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  She was shopping at [employer], she had this progressively worsening chest pain, described as pressure/heaviness all over the chest radiating to the back, involving also upper part of her abdomen, lasting for 5 minutes, with severity of 10 /10, resolved with rest, associated with lightheadedness. She felt that she was going to pass out however no syncopal event.  She was also diaphoretic, however no jaw or arm radiation.   With regards to her shortness of breath, it has been getting worse, to the point that she cannot go to the bathroom or do normal ADLs in the past 3 weeks.  She feels fatigued throughout which has been progressively getting worse at the same time.  Chronically, she has never reported any orthopnea, PND, presyncopal/syncopal events.  No exertional chest pain, shortness of breath, bilateral leg swelling, prior to this.  She does report that last year (2023, August), she had a 2-hour episode of slurring of speech, gait instability, numbness and weakness, which was ruled out for stroke but small vessel disease (nonspecific hyperintensities on FLAIR and T2 weighted imaging in the subcortical and periventricular white matter ) with MRI however possible to have a TIA episode.     Last Recorded Vitals:  Vitals:    06/06/24 0315 06/06/24 0558 06/06/24 0659 06/06/24 0737   BP: 147/87 125/87 147/80    BP Location:       Patient Position:        Pulse: 63 65 63 81   Resp: 16 16 18    Temp:   36.2 °C (97.2 °F)    TempSrc:   Temporal    SpO2: 97% 98% 99%    Weight:       Height:           Last Labs:  CBC - 6/6/2024:  3:48 AM  6.4 9.8 242    29.4      CMP - 6/5/2024: 11:58 PM  9.4 7.5 27 --- 0.7   _ 4.1 17 72      PTT - 6/6/2024:  6:19 AM  1.0   11.4 27     Troponin I, High Sensitivity   Date/Time Value Ref Range Status   06/06/2024 01:11 AM 14 0 - 34 ng/L Final   06/05/2024 11:58 PM 16 0 - 34 ng/L Final     BNP   Date/Time Value Ref Range Status   06/05/2024 11:58 PM 15 0 - 99 pg/mL Final   05/04/2020 05:02 PM 13 0 - 99 pg/mL Final     Comment:     .  <100 pg/mL - Heart failure unlikely  100-299 pg/mL - Intermediate probability of acute heart  .               failure exacerbation. Correlate with clinical  .               context and patient history.    >=300 pg/mL - Heart Failure likely. Correlate with clinical  .               context and patient history.   Biotin interference may cause falsely decreased results.   Patients taking a Biotin dose of up to 5 mg/day should   refrain from taking Biotin for 24 hours before sample   collection. Providers may contact their local laboratory   for further information.       Hemoglobin A1C   Date/Time Value Ref Range Status   12/12/2021 04:56 AM 5.5 % Final     Comment:          Diagnosis of Diabetes-Adults   Non-Diabetic: < or = 5.6%   Increased risk for developing diabetes: 5.7-6.4%   Diagnostic of diabetes: > or = 6.5%  .       Monitoring of Diabetes                Age (y)     Therapeutic Goal (%)   Adults:          >18           <7.0   Pediatrics:    13-18           <7.5                   7-12           <8.0                   0- 6            7.5-8.5   American Diabetes Association. Diabetes Care 33(S1), Jan 2010.        Last I/O:  No intake/output data recorded.    Past Cardiology Tests (Last 3 Years):  EKG:  EKG reviewed from 4/5/2020 and 2/28/2019 shows sinus bradycardia with average ventricular rate in 60s,  "and low voltage QRS.  Isolated Q-wave in lead III  EKG reviewed from 2024 shows heart rate transition at V4/V5, normal sinus rhythm, no intraventricular conduction delay, AV conduction delay, bundle branch blocks, normal axis.  Isolated Q-wave in lead III, unchanged from prior.    Echo:  No results found for this or any previous visit from the past 1095 days.    Ejection Fractions:  No results found for: \"EF\"  Cath:  No results found for this or any previous visit from the past 1095 days.    Stress Test:  No results found for this or any previous visit from the past 1095 days.    Cardiac Imaging:  No results found for this or any previous visit from the past 1095 days.      Past Medical History:  She has no past medical history on file.    Past Surgical History:  She has a past surgical history that includes Hysterectomy (2017); IR CVC tunneled (2020); MR angio head wo IV contrast (2023); MR angio neck wo IV contrast (2023); and CT angio head w and wo IV contrast (3/17/2023).      Social History:  She reports that she has never smoked. She has never used smokeless tobacco. She reports current alcohol use. She reports that she does not use drugs.    Family History:  Family History   Problem Relation Name Age of Onset    Cancer Mother  Death at age of 40    Cancer Father   at age of 67    Cancer Sister      Cancer, heart attacks at age of 50 Brother x3  2/3 , reportedly from heart attack        Allergies:  Patient has no known allergies.    Inpatient Medications:  Scheduled medications   Medication Dose Route Frequency    calcium gluconate  1 g intravenous Once    cholecalciferol  5,000 Units oral Daily    docusate sodium  100 mg oral BID    DULoxetine  60 mg oral Daily    enoxaparin  40 mg subcutaneous q24h    folic acid  1 mg oral Daily    gabapentin  300 mg oral Nightly    methotrexate  20 mg oral Every     pantoprazole  40 mg oral Daily    polyethylene glycol  17 g oral " Daily    predniSONE  10 mg oral Daily    pyridostigmine  180 mg oral Nightly    pyridostigmine  60 mg oral q4h     PRN medications   Medication    acetaminophen    traZODone     Continuous Medications   Medication Dose Last Rate     Outpatient Medications:  Current Outpatient Medications   Medication Instructions    0.9 % sodium chloride (sodium chloride 0.9%) solution As directed.    Adrenalin 1 mg, As directed.<BR>    cephalexin (Keflex) 500 mg capsule 1 capsule, oral, 3 times daily    cyanocobalamin (Vitamin B-12) 500 mcg tablet 1 tablet, oral, Daily    diphenhydrAMINE (BENADRYL) 50 mg, As directed.<BR>    DULoxetine (Cymbalta) 60 mg DR capsule 1 capsule, oral, Daily    fluconazole (Diflucan) 100 mg tablet Take 2 tablets by mouth today and then 1 tablet every morninig for 14 days    folic acid (FOLVITE) 1 mg, oral, Daily    gabapentin (NEURONTIN) 300 mg, oral, Nightly    methotrexate (TREXALL) 20 mg, oral, Once Weekly    mupirocin (Bactroban) 2 % ointment 1 Application, Topical, 3 times daily RT    pantoprazole (ProtoNix) 40 mg EC tablet 1 tablet, oral, Daily    predniSONE (Deltasone) 10 mg tablet 1 tablet, oral, Daily    predniSONE (DELTASONE) 15 mg, oral, Daily    pyridostigmine (Mestinon) 60 mg tablet 1 tablet, oral, 4 times daily    pyridostigmine (MESTINON) 180 mg, oral, Nightly    traZODone (Desyrel) 50 mg tablet TAKE 1/2 TO 1 TABS AT BEDTIME       Physical Exam:  Physical Exam  Constitutional:       Appearance: She is normal weight.   HENT:      Head: Normocephalic.      Nose: Nose normal.   Eyes:      Pupils: Pupils are equal, round, and reactive to light.   Cardiovascular:      Rate and Rhythm: Normal rate and regular rhythm.      Pulses: Normal pulses.      Heart sounds: Normal heart sounds. No murmur heard.  Abdominal:      General: Abdomen is flat.   Musculoskeletal:      Right lower leg: No edema.      Left lower leg: No edema.   Skin:     Capillary Refill: Capillary refill takes less than 2 seconds.    Neurological:      Mental Status: She is alert.              Assessment/Plan   Chest pain under evaluation  69-year-old female patient being evaluated for 1 time  episode of cardiac chest pain, resolved spontaneously after 5 minutes 1 week ago.  Patient does have significant family history where 3 of her brothers, (2 ), reportedly had myocardial infarction in age of 50s.  No known current history of diabetes, hypertension, dyslipidemia or smoking however 1 time episode of TiA and MRI evidence of small vessel disease often seen in uncontrolled HTN.  Negative troponins and BNP, however does not rule out unstable angina/prior ACS event.  She looks euvolemic on exam and no signs of heart failure, to suggest she had massive MI before.  EKG shows isolated nonpathologic Q-wave in lead III which has been stable since 2019.     Recs:  Would be helpful to risk stratify with lipid panel, A1c and TSH.  Please obtain CT coronary angiogram  Please obtain echocardiogram also if possible.    Case discussed with Dr. Abelardo Daigle MD  Internal Medicine Resident PGY2  [facility] [place]

## 2024-06-06 NOTE — POST-PROCEDURE NOTE
This is a 69 y.o. female with Myasthenia Gravis who underwent therapeutic plasma exchange (TPE) with 100% albumin as replacement fluid.     I saw and evaluated the patient during the TPE.  The patient was resting in bed.  The vascular access functioned well.     Pre-procedure labs:  WBC   Date/Time Value Ref Range Status   06/06/2024 03:48 AM 6.4 4.4 - 11.3 x10*3/uL Final     Hemoglobin   Date/Time Value Ref Range Status   06/06/2024 03:48 AM 9.8 (L) 12.0 - 16.0 g/dL Final     Hematocrit   Date/Time Value Ref Range Status   06/06/2024 03:48 AM 29.4 (L) 36.0 - 46.0 % Final     Platelets   Date/Time Value Ref Range Status   06/06/2024 03:48  150 - 450 x10*3/uL Final        POCT Calcium, Ionized   Date/Time Value Ref Range Status   06/06/2024 06:19 AM 1.02 (L) 1.1 - 1.33 mmol/L Final     Comment:     The performance characteristics of ionized calcium tested  in heparinized plasma or serum have been validated by the  individual  laboratory site where testing is performed.   Testing on heparinized plasma or serum is not approved by   the FDA; however, such approval is not necessary.        Protime   Date/Time Value Ref Range Status   06/06/2024 06:19 AM 11.4 9.8 - 12.8 seconds Final     INR   Date/Time Value Ref Range Status   06/06/2024 06:19 AM 1.0 0.9 - 1.1 Final     aPTT   Date/Time Value Ref Range Status   06/06/2024 06:19 AM 27 27 - 38 seconds Final     Fibrinogen   Date/Time Value Ref Range Status   06/06/2024 06:19  200 - 400 mg/dL Final        Pre-procedure vital signs at 16:30:  T: 36.5 C, HR: 59, RR: 16, /89  Pulse oximetry: 99% on room air    Post-procedure vital signs at 18:10:  T: 36.5 C, HR: 59, RR: 20, BP: 164/81     Pulse oximetry: 98% on room air    Outcome: TPE completed without complications.   Adverse Reaction: No    Assessment and Plan:  69 y.o. female with Myasthenia Gravis who underwent TPE #1.  Draw post-procedure labs  Vascular access to be managed by clinical team.  Next TPE  #2 is schedule for 6/7/2024.

## 2024-06-07 ENCOUNTER — APPOINTMENT (OUTPATIENT)
Dept: OTHER | Facility: HOSPITAL | Age: 69
End: 2024-06-07
Payer: COMMERCIAL

## 2024-06-07 ENCOUNTER — APPOINTMENT (OUTPATIENT)
Dept: RADIOLOGY | Facility: HOSPITAL | Age: 69
End: 2024-06-07
Payer: COMMERCIAL

## 2024-06-07 ENCOUNTER — APPOINTMENT (OUTPATIENT)
Dept: CARDIOLOGY | Facility: HOSPITAL | Age: 69
End: 2024-06-07
Payer: COMMERCIAL

## 2024-06-07 LAB
ALBUMIN SERPL BCP-MCNC: 4.1 G/DL (ref 3.4–5)
ANION GAP SERPL CALC-SCNC: 18 MMOL/L (ref 10–20)
APTT PPP: >200 SECONDS (ref 27–38)
BLOOD EXPIRATION DATE: NORMAL
BUN SERPL-MCNC: 18 MG/DL (ref 6–23)
CA-I BLD-SCNC: 1.02 MMOL/L (ref 1.1–1.33)
CA-I BLD-SCNC: 1.16 MMOL/L (ref 1.1–1.33)
CALCIUM SERPL-MCNC: 8.8 MG/DL (ref 8.6–10.6)
CHLORIDE SERPL-SCNC: 106 MMOL/L (ref 98–107)
CO2 SERPL-SCNC: 18 MMOL/L (ref 21–32)
COMPONENT CODE: NORMAL
CREAT SERPL-MCNC: 0.77 MG/DL (ref 0.5–1.05)
DISPENSE STATUS: NORMAL
EGFRCR SERPLBLD CKD-EPI 2021: 83 ML/MIN/1.73M*2
ERYTHROCYTE [DISTWIDTH] IN BLOOD BY AUTOMATED COUNT: 17.3 % (ref 11.5–14.5)
ERYTHROCYTE [DISTWIDTH] IN BLOOD BY AUTOMATED COUNT: 17.3 % (ref 11.5–14.5)
FIBRINOGEN PPP-MCNC: 136 MG/DL (ref 200–400)
GLUCOSE SERPL-MCNC: 127 MG/DL (ref 74–99)
HCT VFR BLD AUTO: 33.8 % (ref 36–46)
HCT VFR BLD AUTO: 35.3 % (ref 36–46)
HGB BLD-MCNC: 10.7 G/DL (ref 12–16)
HGB BLD-MCNC: 11.1 G/DL (ref 12–16)
INR PPP: ABNORMAL
MCH RBC QN AUTO: 27.2 PG (ref 26–34)
MCH RBC QN AUTO: 27.3 PG (ref 26–34)
MCHC RBC AUTO-ENTMCNC: 31.4 G/DL (ref 32–36)
MCHC RBC AUTO-ENTMCNC: 31.7 G/DL (ref 32–36)
MCV RBC AUTO: 86 FL (ref 80–100)
MCV RBC AUTO: 87 FL (ref 80–100)
NRBC BLD-RTO: 0 /100 WBCS (ref 0–0)
NRBC BLD-RTO: 0 /100 WBCS (ref 0–0)
PHOSPHATE SERPL-MCNC: 4.7 MG/DL (ref 2.5–4.9)
PLATELET # BLD AUTO: 273 X10*3/UL (ref 150–450)
PLATELET # BLD AUTO: 275 X10*3/UL (ref 150–450)
POTASSIUM SERPL-SCNC: 3.6 MMOL/L (ref 3.5–5.3)
PRODUCT BLOOD TYPE: 5100
PRODUCT BLOOD TYPE: 9500
PRODUCT CODE: NORMAL
PROTHROMBIN TIME: >100 SECONDS (ref 9.8–12.8)
RBC # BLD AUTO: 3.92 X10*6/UL (ref 4–5.2)
RBC # BLD AUTO: 4.08 X10*6/UL (ref 4–5.2)
SODIUM SERPL-SCNC: 138 MMOL/L (ref 136–145)
UNIT ABO: NORMAL
UNIT NUMBER: NORMAL
UNIT RH: NORMAL
UNIT VOLUME: 281
UNIT VOLUME: 286
UNIT VOLUME: 288
UNIT VOLUME: 288
UNIT VOLUME: 311
UNIT VOLUME: 319
WBC # BLD AUTO: 7.5 X10*3/UL (ref 4.4–11.3)
WBC # BLD AUTO: 9 X10*3/UL (ref 4.4–11.3)

## 2024-06-07 PROCEDURE — 99231 SBSQ HOSP IP/OBS SF/LOW 25: CPT

## 2024-06-07 PROCEDURE — 2500000001 HC RX 250 WO HCPCS SELF ADMINISTERED DRUGS (ALT 637 FOR MEDICARE OP): Performed by: STUDENT IN AN ORGANIZED HEALTH CARE EDUCATION/TRAINING PROGRAM

## 2024-06-07 PROCEDURE — 75574 CT ANGIO HRT W/3D IMAGE: CPT | Performed by: RADIOLOGY

## 2024-06-07 PROCEDURE — 85384 FIBRINOGEN ACTIVITY: CPT

## 2024-06-07 PROCEDURE — 2500000002 HC RX 250 W HCPCS SELF ADMINISTERED DRUGS (ALT 637 FOR MEDICARE OP, ALT 636 FOR OP/ED)

## 2024-06-07 PROCEDURE — 2500000001 HC RX 250 WO HCPCS SELF ADMINISTERED DRUGS (ALT 637 FOR MEDICARE OP)

## 2024-06-07 PROCEDURE — P9017 PLASMA 1 DONOR FRZ W/IN 8 HR: HCPCS

## 2024-06-07 PROCEDURE — 2500000004 HC RX 250 GENERAL PHARMACY W/ HCPCS (ALT 636 FOR OP/ED): Mod: JZ

## 2024-06-07 PROCEDURE — 84100 ASSAY OF PHOSPHORUS: CPT

## 2024-06-07 PROCEDURE — 1200000002 HC GENERAL ROOM WITH TELEMETRY DAILY

## 2024-06-07 PROCEDURE — P9045 ALBUMIN (HUMAN), 5%, 250 ML: HCPCS | Mod: JZ

## 2024-06-07 PROCEDURE — 2500000005 HC RX 250 GENERAL PHARMACY W/O HCPCS

## 2024-06-07 PROCEDURE — 82330 ASSAY OF CALCIUM: CPT | Mod: 91

## 2024-06-07 PROCEDURE — 85027 COMPLETE CBC AUTOMATED: CPT | Mod: 91

## 2024-06-07 PROCEDURE — 2500000004 HC RX 250 GENERAL PHARMACY W/ HCPCS (ALT 636 FOR OP/ED)

## 2024-06-07 PROCEDURE — 99233 SBSQ HOSP IP/OBS HIGH 50: CPT | Performed by: INTERNAL MEDICINE

## 2024-06-07 PROCEDURE — 82330 ASSAY OF CALCIUM: CPT

## 2024-06-07 PROCEDURE — 36514 APHERESIS PLASMA: CPT

## 2024-06-07 PROCEDURE — 85027 COMPLETE CBC AUTOMATED: CPT

## 2024-06-07 PROCEDURE — 2550000001 HC RX 255 CONTRASTS

## 2024-06-07 PROCEDURE — 86078 PHYS BLOOD BANK SERV REACTJ: CPT

## 2024-06-07 PROCEDURE — 75574 CT ANGIO HRT W/3D IMAGE: CPT

## 2024-06-07 PROCEDURE — 2500000004 HC RX 250 GENERAL PHARMACY W/ HCPCS (ALT 636 FOR OP/ED): Performed by: RADIOLOGY

## 2024-06-07 PROCEDURE — 2500000004 HC RX 250 GENERAL PHARMACY W/ HCPCS (ALT 636 FOR OP/ED): Performed by: STUDENT IN AN ORGANIZED HEALTH CARE EDUCATION/TRAINING PROGRAM

## 2024-06-07 PROCEDURE — 85610 PROTHROMBIN TIME: CPT

## 2024-06-07 RX ORDER — METOPROLOL TARTRATE 50 MG/1
100 TABLET ORAL ONCE
Status: DISCONTINUED | OUTPATIENT
Start: 2024-06-07 | End: 2024-06-08

## 2024-06-07 RX ORDER — NITROGLYCERIN 0.4 MG/1
0.8 TABLET SUBLINGUAL ONCE
Status: COMPLETED | OUTPATIENT
Start: 2024-06-07 | End: 2024-06-07

## 2024-06-07 RX ORDER — ACETAMINOPHEN 325 MG/1
650 TABLET ORAL ONCE
Status: COMPLETED | OUTPATIENT
Start: 2024-06-07 | End: 2024-06-07

## 2024-06-07 RX ORDER — CALCIUM GLUCONATE 20 MG/ML
3519 INJECTION, SOLUTION INTRAVENOUS ONCE
Status: COMPLETED | OUTPATIENT
Start: 2024-06-07 | End: 2024-06-07

## 2024-06-07 RX ORDER — METOPROLOL TARTRATE 1 MG/ML
5 INJECTION, SOLUTION INTRAVENOUS ONCE AS NEEDED
Status: DISCONTINUED | OUTPATIENT
Start: 2024-06-07 | End: 2024-06-08

## 2024-06-07 RX ORDER — METOPROLOL TARTRATE 50 MG/1
100 TABLET ORAL ONCE AS NEEDED
Status: DISCONTINUED | OUTPATIENT
Start: 2024-06-07 | End: 2024-06-08

## 2024-06-07 RX ORDER — DIPHENHYDRAMINE HYDROCHLORIDE 50 MG/ML
25 INJECTION INTRAMUSCULAR; INTRAVENOUS EVERY 5 MIN PRN
Status: DISCONTINUED | OUTPATIENT
Start: 2024-06-07 | End: 2024-06-07 | Stop reason: HOSPADM

## 2024-06-07 RX ORDER — DIPHENHYDRAMINE HCL 25 MG
25 CAPSULE ORAL ONCE
Status: DISCONTINUED | OUTPATIENT
Start: 2024-06-07 | End: 2024-06-07 | Stop reason: HOSPADM

## 2024-06-07 RX ORDER — ALBUMIN HUMAN 50 G/1000ML
12.5 SOLUTION INTRAVENOUS
Status: COMPLETED | OUTPATIENT
Start: 2024-06-07 | End: 2024-06-07

## 2024-06-07 RX ORDER — HEPARIN SODIUM 1000 [USP'U]/ML
1000 INJECTION, SOLUTION INTRAVENOUS; SUBCUTANEOUS ONCE
Status: COMPLETED | OUTPATIENT
Start: 2024-06-07 | End: 2024-06-07

## 2024-06-07 RX ORDER — CALCIUM CARBONATE 200(500)MG
1500 TABLET,CHEWABLE ORAL EVERY 5 MIN PRN
Status: DISCONTINUED | OUTPATIENT
Start: 2024-06-07 | End: 2024-06-07 | Stop reason: HOSPADM

## 2024-06-07 RX ORDER — DIPHENHYDRAMINE HCL 25 MG
50 CAPSULE ORAL ONCE
Status: COMPLETED | OUTPATIENT
Start: 2024-06-07 | End: 2024-06-07

## 2024-06-07 RX ORDER — PYRIDOSTIGMINE BROMIDE 60 MG/1
60 TABLET ORAL
Status: DISCONTINUED | OUTPATIENT
Start: 2024-06-07 | End: 2024-06-13 | Stop reason: HOSPADM

## 2024-06-07 RX ORDER — METOPROLOL TARTRATE 1 MG/ML
5 INJECTION, SOLUTION INTRAVENOUS ONCE
Status: COMPLETED | OUTPATIENT
Start: 2024-06-07 | End: 2024-06-07

## 2024-06-07 RX ORDER — LORAZEPAM 2 MG/ML
0.5 INJECTION INTRAMUSCULAR EVERY 5 MIN PRN
Status: DISCONTINUED | OUTPATIENT
Start: 2024-06-07 | End: 2024-06-13 | Stop reason: HOSPADM

## 2024-06-07 RX ADMIN — PYRIDOSTIGMINE BROMIDE 60 MG: 60 TABLET ORAL at 02:50

## 2024-06-07 RX ADMIN — DOCUSATE SODIUM 100 MG: 100 CAPSULE, LIQUID FILLED ORAL at 12:24

## 2024-06-07 RX ADMIN — Medication 5000 UNITS: at 12:24

## 2024-06-07 RX ADMIN — TRAZODONE HYDROCHLORIDE 25 MG: 50 TABLET ORAL at 21:35

## 2024-06-07 RX ADMIN — ALBUMIN HUMAN 12.5 G: 0.05 INJECTION, SOLUTION INTRAVENOUS at 12:28

## 2024-06-07 RX ADMIN — FERROUS SULFATE TAB 325 MG (65 MG ELEMENTAL FE) 1 TABLET: 325 (65 FE) TAB at 12:24

## 2024-06-07 RX ADMIN — ACETAMINOPHEN 650 MG: 325 TABLET ORAL at 06:10

## 2024-06-07 RX ADMIN — ACETAMINOPHEN 650 MG: 325 TABLET ORAL at 21:35

## 2024-06-07 RX ADMIN — ALBUMIN HUMAN 12.5 G: 0.05 INJECTION, SOLUTION INTRAVENOUS at 12:35

## 2024-06-07 RX ADMIN — IOHEXOL 164 ML: 350 INJECTION, SOLUTION INTRAVENOUS at 11:34

## 2024-06-07 RX ADMIN — GABAPENTIN 300 MG: 300 CAPSULE ORAL at 21:35

## 2024-06-07 RX ADMIN — METOPROLOL TARTRATE 5 MG: 5 INJECTION, SOLUTION INTRAVENOUS at 10:48

## 2024-06-07 RX ADMIN — CALCIUM GLUCONATE 3519 MG: 20 INJECTION, SOLUTION INTRAVENOUS at 12:25

## 2024-06-07 RX ADMIN — PYRIDOSTIGMINE BROMIDE 60 MG: 60 TABLET ORAL at 12:25

## 2024-06-07 RX ADMIN — HEPARIN SODIUM 1000 UNITS: 1000 INJECTION, SOLUTION INTRAVENOUS; SUBCUTANEOUS at 15:00

## 2024-06-07 RX ADMIN — PYRIDOSTIGMINE BROMIDE 60 MG: 60 TABLET ORAL at 06:11

## 2024-06-07 RX ADMIN — ENOXAPARIN SODIUM 40 MG: 40 INJECTION, SOLUTION SUBCUTANEOUS at 09:00

## 2024-06-07 RX ADMIN — SODIUM CHLORIDE 250 ML: 9 INJECTION, SOLUTION INTRAVENOUS at 12:15

## 2024-06-07 RX ADMIN — DULOXETINE HYDROCHLORIDE 60 MG: 60 CAPSULE, DELAYED RELEASE ORAL at 12:24

## 2024-06-07 RX ADMIN — PANTOPRAZOLE SODIUM 40 MG: 40 TABLET, DELAYED RELEASE ORAL at 12:24

## 2024-06-07 RX ADMIN — ACETAMINOPHEN 650 MG: 325 TABLET ORAL at 11:59

## 2024-06-07 RX ADMIN — PYRIDOSTIGMINE BROMIDE 60 MG: 60 TABLET ORAL at 17:30

## 2024-06-07 RX ADMIN — DIPHENHYDRAMINE HYDROCHLORIDE 50 MG: 25 CAPSULE ORAL at 11:59

## 2024-06-07 RX ADMIN — POLYETHYLENE GLYCOL 3350 17 G: 17 POWDER, FOR SOLUTION ORAL at 12:28

## 2024-06-07 RX ADMIN — FOLIC ACID 1 MG: 1 TABLET ORAL at 12:29

## 2024-06-07 RX ADMIN — ACETAMINOPHEN 650 MG: 325 TABLET ORAL at 02:49

## 2024-06-07 RX ADMIN — DIPHENHYDRAMINE HYDROCHLORIDE 25 MG: 50 INJECTION, SOLUTION INTRAMUSCULAR; INTRAVENOUS at 13:45

## 2024-06-07 RX ADMIN — HEPARIN SODIUM 1000 UNITS: 1000 INJECTION INTRAVENOUS; SUBCUTANEOUS at 15:00

## 2024-06-07 RX ADMIN — LIDOCAINE 1 PATCH: 4 PATCH TOPICAL at 12:27

## 2024-06-07 RX ADMIN — PREDNISONE 10 MG: 10 TABLET ORAL at 12:29

## 2024-06-07 RX ADMIN — PYRIDOSTIGMINE BROMIDE 180 MG: 180 TABLET ORAL at 21:36

## 2024-06-07 RX ADMIN — NITROGLYCERIN 0.8 MG: 0.4 TABLET SUBLINGUAL at 10:48

## 2024-06-07 ASSESSMENT — COGNITIVE AND FUNCTIONAL STATUS - GENERAL
MOBILITY SCORE: 24
DAILY ACTIVITIY SCORE: 24
MOBILITY SCORE: 24
DAILY ACTIVITIY SCORE: 24

## 2024-06-07 ASSESSMENT — PAIN SCALES - GENERAL: PAINLEVEL_OUTOF10: 6

## 2024-06-07 ASSESSMENT — PAIN DESCRIPTION - DESCRIPTORS: DESCRIPTORS: SHARP

## 2024-06-07 NOTE — POST-PROCEDURE NOTE
This is a 69 y.o. female with Myasthenia Gravis who underwent therapeutic plasma exchange (TPE) with  23% albumin and 77% plasma as replacement fluid.     I saw and evaluated the patient during the TPE.  The patient was resting in bed.  The vascular access functioned well.     Pre-procedure labs:  WBC   Date/Time Value Ref Range Status   06/07/2024 07:35 AM 7.5 4.4 - 11.3 x10*3/uL Final     Hemoglobin   Date/Time Value Ref Range Status   06/07/2024 07:35 AM 11.1 (L) 12.0 - 16.0 g/dL Final     Hematocrit   Date/Time Value Ref Range Status   06/07/2024 07:35 AM 35.3 (L) 36.0 - 46.0 % Final     Platelets   Date/Time Value Ref Range Status   06/07/2024 07:35  150 - 450 x10*3/uL Final        POCT Calcium, Ionized   Date/Time Value Ref Range Status   06/07/2024 07:35 AM 1.16 1.1 - 1.33 mmol/L Final     Comment:     The performance characteristics of ionized calcium tested  in heparinized plasma or serum have been validated by the  individual  laboratory site where testing is performed.   Testing on heparinized plasma or serum is not approved by   the FDA; however, such approval is not necessary.        Protime   Date/Time Value Ref Range Status   06/07/2024 07:35 AM >100.0 (HH) 9.8 - 12.8 seconds Final     INR   Date/Time Value Ref Range Status   06/07/2024 07:35 AM   Final     Comment:     Unable To Calculate INR     aPTT   Date/Time Value Ref Range Status   06/07/2024 07:35 AM >200 (HH) 27 - 38 seconds Final     Fibrinogen   Date/Time Value Ref Range Status   06/07/2024 07:35  (L) 200 - 400 mg/dL Final        Pre-procedure vital signs at 11:48:  T: 36.6 C, HR: 62, RR: 16, /72  Pulse oximetry: 97% on room air    Post-procedure vital signs at 14:46:  T: 36.9 C, HR: 66, RR: 16, BP: 137/75     Pulse oximetry: 93% on room air    Outcome: TPE completed.     Adverse Reaction: Yes. Following the transfusion of the 3rd unit of plasma, the patient complained of itching and a hive on her right lower abdomen.  The procedure was paused. She was given 25mg IV diphenhydramine after which her symptoms resolved. The procedure was resumed and completed without further complications.     Assessment and Plan:  69 y.o. female with Myasthenia Gravis who underwent TPE #2.  Draw post-procedure labs.  Vascular access to be managed by clinical team.  Next TPE #3 is schedule for 06/10/24.

## 2024-06-07 NOTE — CARE PLAN
The patient's goals for the shift include      The clinical goals for the shift include To get better      Problem: Pain  Goal: My pain/discomfort is manageable  Outcome: Progressing     Problem: Safety  Goal: Patient will be injury free during hospitalization  Outcome: Progressing  Goal: I will remain free of falls  Outcome: Progressing     Problem: Daily Care  Goal: Daily care needs are met  Outcome: Progressing     Problem: Psychosocial Needs  Goal: Demonstrates ability to cope with hospitalization/illness  Outcome: Progressing  Goal: Collaborate with me, my family, and caregiver to identify my specific goals  Outcome: Progressing     Problem: Discharge Barriers  Goal: My discharge needs are met  Outcome: Progressing

## 2024-06-07 NOTE — PROGRESS NOTES
Subjective Data:  69-year-old female, with past medical history significant for seronegative myasthenia gravis (on IVIG every 3 weeks, methotrexate, pyridostigmine), prediabetes (A1c 5.7, 6/7/2024), iron deficiency anemia, was consulted for chest pain, worsening shortness of breath and isolated Q-wave in lead III and EKG.  Primary team treating patient for myasthenic crisis, day 2 of plasmapheresis.  Cardiology evaluating patient for coronary artery disease, underwent CCTA (6/7/2024) which showed nonobstructive coronary artery disease with minimal stenosis (1-24%) within the proximal LAD. Remaining coronary arteries are normal. Awaiting echo cardiogram with bubble study today.    Pt reports that she had a has been feeling significantly well today.  Her shortness of breath generalized weakness has improved.  Did not complain of any overnight chest pain, palpitations syncope/preyncopal events. Complains of some restriction in neck movement which I explained to her is  normal after getting the central line placed.    Pt not on telemetry    Overnight Events:    Underwent therapeutic plasma exchange with 100% albumin as replacement fluid through right internal jugular vein trialysis line which was placed overnight by interventional radiology. Tolerated right IJ central line insertion well.      Objective Data:  Last Recorded Vitals:  Vitals:    06/06/24 1719 06/06/24 1742 06/06/24 1800 06/07/24 0101   BP: 171/88 157/77 164/81 95/60   BP Location:       Patient Position:       Pulse: 69 55 59 63   Resp: 20 20 20 20   Temp: 36.7 °C (98.1 °F) 36.6 °C (97.9 °F) 36.5 °C (97.7 °F) 36.2 °C (97.2 °F)   TempSrc: Temporal Temporal Temporal    SpO2:   98% 98%   Weight:       Height:           Last Labs:  CBC - 6/6/2024:  5:58 PM  7.5 11.3 277    35.3      CMP - 6/5/2024: 11:58 PM  9.4 7.5 27 --- 0.7   _ 4.1 17 72      PTT - 6/6/2024:  6:19 AM  1.0   11.4 27     TROPHS   Date/Time Value Ref Range Status   06/06/2024 01:11 AM 14 0 -  34 ng/L Final   06/05/2024 11:58 PM 16 0 - 34 ng/L Final     BNP   Date/Time Value Ref Range Status   06/05/2024 11:58 PM 15 0 - 99 pg/mL Final   05/04/2020 05:02 PM 13 0 - 99 pg/mL Final     Comment:     .  <100 pg/mL - Heart failure unlikely  100-299 pg/mL - Intermediate probability of acute heart  .               failure exacerbation. Correlate with clinical  .               context and patient history.    >=300 pg/mL - Heart Failure likely. Correlate with clinical  .               context and patient history.   Biotin interference may cause falsely decreased results.   Patients taking a Biotin dose of up to 5 mg/day should   refrain from taking Biotin for 24 hours before sample   collection. Providers may contact their local laboratory   for further information.       HGBA1C   Date/Time Value Ref Range Status   06/06/2024 03:48 AM 5.7 see below % Final   12/12/2021 04:56 AM 5.5 % Final     Comment:          Diagnosis of Diabetes-Adults   Non-Diabetic: < or = 5.6%   Increased risk for developing diabetes: 5.7-6.4%   Diagnostic of diabetes: > or = 6.5%  .       Monitoring of Diabetes                Age (y)     Therapeutic Goal (%)   Adults:          >18           <7.0   Pediatrics:    13-18           <7.5                   7-12           <8.0                   0- 6            7.5-8.5   American Diabetes Association. Diabetes Care 33(S1), Jan 2010.       LDLCALC   Date/Time Value Ref Range Status   06/05/2024 11:58 PM 94 <=99 mg/dL Final     Comment:                                 Near   Borderline      AGE      Desirable  Optimal    High     High     Very High     0-19 Y     0 - 109     ---    110-129   >/= 130     ----    20-24 Y     0 - 119     ---    120-159   >/= 160     ----      >24 Y     0 -  99   100-129  130-159   160-189     >/=190       VLDL   Date/Time Value Ref Range Status   06/05/2024 11:58 PM 18 0 - 40 mg/dL Final      Last I/O:  I/O last 3 completed shifts:  In: 2990 (49.9 mL/kg)  "[Other:2990]  Out: 2802 (46.8 mL/kg) [Other:2802]  Weight: 59.9 kg     Past Cardiology Tests (Last 3 Years):  EKG:  EKG reviewed from 4/5/2020 and 2/28/2019 shows sinus bradycardia with average ventricular rate in 60s, and low voltage QRS.  Isolated Q-wave in lead III  EKG reviewed from 6/5/2024 shows heart rate transition at V4/V5, normal sinus rhythm, no intraventricular conduction delay, AV conduction delay, bundle branch blocks, normal axis.  Isolated Q-wave in lead III, unchanged from prior.    Echo:  No results found for this or any previous visit from the past 1095 days.    Ejection Fractions:  No results found for: \"EF\"  Cath:  No results found for this or any previous visit from the past 1095 days.    Stress Test:  No results found for this or any previous visit from the past 1095 days.    Cardiac Imaging: CCTA 6/7/24  IMPRESSION:  1.  Nonobstructive coronary artery disease with minimal stenosis (1-24%) within the proximal LAD. Remaining coronary arteries are normal.  2. Small hiatal hernia.  3. Incompletely characterized hypodensities in the superior pole of the left kidney may represent cysts. Recommend renal ultrasound for confirmation.      Inpatient Medications:  Scheduled medications   Medication Dose Route Frequency    cholecalciferol  5,000 Units oral Daily    docusate sodium  100 mg oral BID    DULoxetine  60 mg oral Daily    enoxaparin  40 mg subcutaneous q24h    ferrous sulfate (325 mg ferrous sulfate)  65 mg of iron oral Daily with breakfast    folic acid  1 mg oral Daily    gabapentin  300 mg oral Nightly    lidocaine  1 patch transdermal Daily    methotrexate  20 mg oral Every Sunday    pantoprazole  40 mg oral Daily    polyethylene glycol  17 g oral Daily    predniSONE  10 mg oral Daily    pyridostigmine  180 mg oral Nightly    pyridostigmine  60 mg oral q4h     PRN medications   Medication    acetaminophen    traZODone     Continuous Medications   Medication Dose Last Rate       Physical " Exam  Vitals reviewed.   HENT:      Head: Normocephalic.      Right Ear: Tympanic membrane normal.      Nose: Nose normal.   Neck:      Comments: Triple-lumen right internal jugular vein central line in place  Cardiovascular:      Rate and Rhythm: Normal rate and regular rhythm.      Pulses: Normal pulses.      Heart sounds: Normal heart sounds, S1 normal and S2 normal. No murmur heard.     No systolic murmur is present.      No diastolic murmur is present.   Pulmonary:      Effort: Pulmonary effort is normal. No respiratory distress.      Breath sounds: Normal breath sounds. No wheezing or rales.   Abdominal:      General: Abdomen is flat. There is no distension.   Musculoskeletal:         General: Normal range of motion.      Right lower leg: No edema.      Left lower leg: No edema.   Skin:     General: Skin is warm.      Capillary Refill: Capillary refill takes less than 2 seconds.   Neurological:      General: No focal deficit present.      Mental Status: She is alert and oriented to person, place, and time. Mental status is at baseline.            Assessment/Plan   #Chest pain under evaluation  #Stage 1 HTN  #Hypercholesterolemia  69-year-old female patient being evaluated for 1 time  episode of cardiac chest pain, resolved spontaneously after 5 minutes 1 week ago.  Patient does have significant family history where 3 of her brothers, (2 ), reportedly had myocardial infarction in age of 50s.  No known current history of diabetes, hypertension, dyslipidemia or smoking however 1 time episode of TiA and MRI evidence of small vessel disease often seen in uncontrolled HTN.  Negative troponins and BNP, however does not rule out unstable angina/prior ACS event.  CCTA negative for coronary artery disease with mild calcification in the left main.     Recs:  10-year ASCVD risk score is 18.9. Moderate risk. With h/o TiA, age, female, and Stage 1 HTN: Would recommend moderate intensity statin with Atorvastatin 40  mg daily. No interaction with atorvastatin with pyridostigmine and methotrexate.  Please obtain echocardiogram with bubble study for h/o TiA.     Melisa Daigle MD  Internal Medicine Resident PGY2    Code Status:  Full Code    Case discussed with attending.    Melisa Daigle MD

## 2024-06-07 NOTE — PROGRESS NOTES
Plan per medical team: PLEX for 3 sessions. May extend to 5  Payer: [employer] [facility]  Status: Inpatient  Discharge disposition: Home  Potential barriers: none  ADOD: 6/11/24  Palma Gong RN

## 2024-06-07 NOTE — PROGRESS NOTES
Page Huston [id number]       Procedure type: Plasma Exchange    Replacement Fluids:  25% Albumin /  75% Plasma     Procedure Completed Transfusion reaction and/or adverse event noted.    Appropriate actions taken.   Attending notified of completion status. See flow sheet(s) for additional details.  Post-Vitals listed below.    Post-procedure vitals:  Vitals @ 1446  BP: (P) 137/75  Temp: (P) 36.9 °C (98.4 °F)  Heart Rate: (P) 66  Resp: (P) 16  SpO2: (P) 93 %  on RA       Tierra Meyers RN

## 2024-06-08 ENCOUNTER — APPOINTMENT (OUTPATIENT)
Dept: CARDIOLOGY | Facility: HOSPITAL | Age: 69
DRG: 057 | End: 2024-06-08
Payer: COMMERCIAL

## 2024-06-08 LAB
ALBUMIN SERPL BCP-MCNC: 3.6 G/DL (ref 3.4–5)
ANION GAP SERPL CALC-SCNC: 12 MMOL/L (ref 10–20)
AORTIC VALVE MEAN GRADIENT: 4 MMHG
AORTIC VALVE PEAK VELOCITY: 1.49 M/S
APTT PPP: 28 SECONDS (ref 27–38)
AV PEAK GRADIENT: 8.9 MMHG
AVA (PEAK VEL): 2.95 CM2
AVA (VTI): 3.23 CM2
BUN SERPL-MCNC: 23 MG/DL (ref 6–23)
CA-I BLD-SCNC: 1.19 MMOL/L (ref 1.1–1.33)
CALCIUM SERPL-MCNC: 8.6 MG/DL (ref 8.6–10.6)
CHLORIDE SERPL-SCNC: 103 MMOL/L (ref 98–107)
CO2 SERPL-SCNC: 29 MMOL/L (ref 21–32)
CREAT SERPL-MCNC: 0.77 MG/DL (ref 0.5–1.05)
EGFRCR SERPLBLD CKD-EPI 2021: 83 ML/MIN/1.73M*2
EJECTION FRACTION APICAL 4 CHAMBER: 76.2
ERYTHROCYTE [DISTWIDTH] IN BLOOD BY AUTOMATED COUNT: 17.4 % (ref 11.5–14.5)
FIBRINOGEN PPP-MCNC: 188 MG/DL (ref 200–400)
GLUCOSE SERPL-MCNC: 81 MG/DL (ref 74–99)
HCT VFR BLD AUTO: 32.3 % (ref 36–46)
HGB BLD-MCNC: 9.9 G/DL (ref 12–16)
INR PPP: 1 (ref 0.9–1.1)
LEFT VENTRICLE INTERNAL DIMENSION DIASTOLE: 4.2 CM (ref 3.5–6)
LEFT VENTRICULAR OUTFLOW TRACT DIAMETER: 2 CM
LV EJECTION FRACTION BIPLANE: 77 %
MCH RBC QN AUTO: 26.8 PG (ref 26–34)
MCHC RBC AUTO-ENTMCNC: 30.7 G/DL (ref 32–36)
MCV RBC AUTO: 88 FL (ref 80–100)
MITRAL VALVE E/A RATIO: 0.98
MITRAL VALVE E/E' RATIO: 8.54
NRBC BLD-RTO: 0 /100 WBCS (ref 0–0)
PHOSPHATE SERPL-MCNC: 3.8 MG/DL (ref 2.5–4.9)
PLATELET # BLD AUTO: 257 X10*3/UL (ref 150–450)
POTASSIUM SERPL-SCNC: 3.5 MMOL/L (ref 3.5–5.3)
PROTHROMBIN TIME: 11.3 SECONDS (ref 9.8–12.8)
RBC # BLD AUTO: 3.69 X10*6/UL (ref 4–5.2)
RIGHT VENTRICLE FREE WALL PEAK S': 12.3 CM/S
RIGHT VENTRICLE PEAK SYSTOLIC PRESSURE: 25.8 MMHG
SODIUM SERPL-SCNC: 140 MMOL/L (ref 136–145)
TRICUSPID ANNULAR PLANE SYSTOLIC EXCURSION: 1.8 CM
WBC # BLD AUTO: 8.3 X10*3/UL (ref 4.4–11.3)

## 2024-06-08 PROCEDURE — 1200000002 HC GENERAL ROOM WITH TELEMETRY DAILY

## 2024-06-08 PROCEDURE — 2500000002 HC RX 250 W HCPCS SELF ADMINISTERED DRUGS (ALT 637 FOR MEDICARE OP, ALT 636 FOR OP/ED)

## 2024-06-08 PROCEDURE — 82330 ASSAY OF CALCIUM: CPT

## 2024-06-08 PROCEDURE — 2500000004 HC RX 250 GENERAL PHARMACY W/ HCPCS (ALT 636 FOR OP/ED): Performed by: STUDENT IN AN ORGANIZED HEALTH CARE EDUCATION/TRAINING PROGRAM

## 2024-06-08 PROCEDURE — 85027 COMPLETE CBC AUTOMATED: CPT

## 2024-06-08 PROCEDURE — 93306 TTE W/DOPPLER COMPLETE: CPT

## 2024-06-08 PROCEDURE — 2500000001 HC RX 250 WO HCPCS SELF ADMINISTERED DRUGS (ALT 637 FOR MEDICARE OP): Performed by: STUDENT IN AN ORGANIZED HEALTH CARE EDUCATION/TRAINING PROGRAM

## 2024-06-08 PROCEDURE — 85384 FIBRINOGEN ACTIVITY: CPT

## 2024-06-08 PROCEDURE — 2500000005 HC RX 250 GENERAL PHARMACY W/O HCPCS

## 2024-06-08 PROCEDURE — 99231 SBSQ HOSP IP/OBS SF/LOW 25: CPT

## 2024-06-08 PROCEDURE — 2500000001 HC RX 250 WO HCPCS SELF ADMINISTERED DRUGS (ALT 637 FOR MEDICARE OP)

## 2024-06-08 PROCEDURE — 2500000004 HC RX 250 GENERAL PHARMACY W/ HCPCS (ALT 636 FOR OP/ED)

## 2024-06-08 PROCEDURE — 80069 RENAL FUNCTION PANEL: CPT

## 2024-06-08 PROCEDURE — 99233 SBSQ HOSP IP/OBS HIGH 50: CPT | Performed by: STUDENT IN AN ORGANIZED HEALTH CARE EDUCATION/TRAINING PROGRAM

## 2024-06-08 PROCEDURE — 93306 TTE W/DOPPLER COMPLETE: CPT | Performed by: INTERNAL MEDICINE

## 2024-06-08 PROCEDURE — 85610 PROTHROMBIN TIME: CPT

## 2024-06-08 RX ORDER — ATORVASTATIN CALCIUM 40 MG/1
40 TABLET, FILM COATED ORAL NIGHTLY
Status: DISCONTINUED | OUTPATIENT
Start: 2024-06-08 | End: 2024-06-13 | Stop reason: HOSPADM

## 2024-06-08 RX ADMIN — PYRIDOSTIGMINE BROMIDE 60 MG: 60 TABLET ORAL at 09:46

## 2024-06-08 RX ADMIN — PYRIDOSTIGMINE BROMIDE 60 MG: 60 TABLET ORAL at 05:49

## 2024-06-08 RX ADMIN — PYRIDOSTIGMINE BROMIDE 60 MG: 60 TABLET ORAL at 13:00

## 2024-06-08 RX ADMIN — PANTOPRAZOLE SODIUM 40 MG: 40 TABLET, DELAYED RELEASE ORAL at 09:00

## 2024-06-08 RX ADMIN — DOCUSATE SODIUM 100 MG: 100 CAPSULE, LIQUID FILLED ORAL at 09:00

## 2024-06-08 RX ADMIN — ENOXAPARIN SODIUM 40 MG: 40 INJECTION, SOLUTION SUBCUTANEOUS at 09:00

## 2024-06-08 RX ADMIN — ACETAMINOPHEN 650 MG: 325 TABLET ORAL at 02:58

## 2024-06-08 RX ADMIN — DULOXETINE HYDROCHLORIDE 60 MG: 60 CAPSULE, DELAYED RELEASE ORAL at 09:00

## 2024-06-08 RX ADMIN — GABAPENTIN 300 MG: 300 CAPSULE ORAL at 21:43

## 2024-06-08 RX ADMIN — PYRIDOSTIGMINE BROMIDE 60 MG: 60 TABLET ORAL at 17:01

## 2024-06-08 RX ADMIN — Medication 5000 UNITS: at 09:00

## 2024-06-08 RX ADMIN — PYRIDOSTIGMINE BROMIDE 180 MG: 180 TABLET ORAL at 21:45

## 2024-06-08 RX ADMIN — PREDNISONE 10 MG: 10 TABLET ORAL at 09:46

## 2024-06-08 RX ADMIN — ACETAMINOPHEN 650 MG: 325 TABLET ORAL at 21:42

## 2024-06-08 RX ADMIN — LIDOCAINE 1 PATCH: 4 PATCH TOPICAL at 09:00

## 2024-06-08 RX ADMIN — PYRIDOSTIGMINE BROMIDE 60 MG: 60 TABLET ORAL at 02:56

## 2024-06-08 RX ADMIN — ACETAMINOPHEN 650 MG: 325 TABLET ORAL at 18:28

## 2024-06-08 RX ADMIN — FOLIC ACID 1 MG: 1 TABLET ORAL at 09:46

## 2024-06-08 RX ADMIN — FERROUS SULFATE TAB 325 MG (65 MG ELEMENTAL FE) 1 TABLET: 325 (65 FE) TAB at 09:00

## 2024-06-08 ASSESSMENT — COGNITIVE AND FUNCTIONAL STATUS - GENERAL
MOBILITY SCORE: 24
MOBILITY SCORE: 24
DAILY ACTIVITIY SCORE: 24
DAILY ACTIVITIY SCORE: 24

## 2024-06-08 ASSESSMENT — PAIN - FUNCTIONAL ASSESSMENT
PAIN_FUNCTIONAL_ASSESSMENT: 0-10

## 2024-06-08 ASSESSMENT — PAIN SCALES - GENERAL
PAINLEVEL_OUTOF10: 8
PAINLEVEL_OUTOF10: 5 - MODERATE PAIN
PAINLEVEL_OUTOF10: 0 - NO PAIN

## 2024-06-08 NOTE — PROGRESS NOTES
"Page Huston is a 69 y.o. female on day 2 of admission presenting with Chest pain, unspecified type.    Subjective   - No overnight events.  - Received second treatment of PLEX last evening, mild urticarial reaction which resolved with diphenhydramine.  - Pt wishes to speak with Cardiac team, has questions for them    Objective   Last Recorded Vitals  Blood pressure 151/71, pulse 64, temperature 36.6 °C (97.9 °F), resp. rate 18, height 1.6 m (5' 3\"), weight 59.9 kg (132 lb), SpO2 97%.    Negative Inspiratory Force (NIF): -50 (06/08/24 0935)      MG-specific exam:  Ptosis on sustained upgaze bilaterally, fluctuating ptosis of L eye  Single breath count: 21  Neck flexion: 5-/5  Neck extension: 5-/5  +mild hoarseness  NIF -50     Mental State:  A&Ox4, conversational, tearful  Follows complex commands  Language intact for comprehension, repetition, expression, naming     Cranial Nerves  - I/III: PERRL  - II: VFF  - III, IV, VI: EOMI no nystagmus. Minimal L sided ptosis (baseline)  - V: V1-V3 sensation intact bilaterally  - VII: Face muscles symmetric with smile, eye closure, eyebrow raising, and cheek puffing  - VIII: Intact to interview  - IX, X: Palate elevated symmetrically, bilaterally.  - XI: 5/5 strength on shoulder shrug bilaterally  - XII: Tongue midline without atrophy or fasciculations     MOTOR EXAM:  Muscle bulk and tone were normal in UE and LE  No fasciculations, tremor, or other abnormal movements present     STRENGTH:      R          L  Deltoid             5       5-  Biceps              5        5  Triceps             5        5                   5         5     Hip flexion       5         5  Quadriceps      5         5  Hamstrings      5         5  DorsiFlex          5         5  PlantarFlex       5         5     REFLEXES:        R          L  Biceps              3          3  Brachioradialis 3          3  Patellar             3         3  Achilles            2          2  Plantar             Down    " "  Down     COORDINATION: Intact on finger to nose bilaterally  SENSORY: Intact to light touch in bilateral UE and LE     This patient has a central line   Reason for the central line remaining today? Dialysis/Hemapheresis    Assessment/Plan      Principal Problem:    Chest pain, unspecified type    Page Huston is a 69 y.o. female with a PMHx of seronegative myasthenia gravis (follows with Dr. Shipley), iron deficiency anemia (pending outpatient endoscopy with GI to evaluate for bleed) who presents to [facility] for worsening dyspnea on exertion, chest pain and reported \"abnormal EKG\" c/f MG exacerbation vs cardiac etiology. Admitted to neurology for MG exacerbation. Planning for 3 treatments of PLEX (6/6, 6/7, 6/10).     Pt has continued fatigue, mild neck flex/ext weakness, increased weakness LE>UE, worsening ptosis L>R, and worsening hoarseness of voice that may be attributed to MG exacerbation/flare. Pt still c/o dyspnea on exertion, substernal chest pain, new back pain that may be due to cardiac etiology despite normal trop/EKG. Iron deficiency anemia may be contributing to her fatigue and shortness of breath, started on Ferrous Sulfate 325mg per medicine team. Cardiac work-up negative. Will need medicine and cardiology follow up outpatient.     Updates 6/8  - Echo complete this am 6/8: normal EF, mild AR  - Plan for PLEX #3 on Monday, then will be stable for discharge to home    Plan  #MG exacerbation  #Seronegative MG  - Outpatient neuromuscular specialist, Dr Shipley, aware of admission  - Hold IVIG to avoid risk of hypercoagulation in the setting of a potential cardiac etiology for SOB  - NIF/VC q6h  Most recent result: Negative Inspiratory Force (NIF): -50 (06/08/24 0935)   - PLEX x 3 days (6/6, 6/7, 6/10)  - C/w home mestinon IR 60q4 and ER 180mg at bedtime  - C/w home methotrexate 20mg qweek and prednisone 10mg  - LRP4 Ab sent per Dr Shipley recs  - Will follow up with neuromuscular outpatient     #Exertional " Dyspnea  #Chest pain  #c/f unstable angina  ::Trops and BNP WNL  ::LDL 94, A1C 5.7  ::TSH WNL  ::EKG with isolated nonpathologic Q wave in lead III (stable since 2019)  ::Cardiology signed off 6/8  ::ASCVD risk 18.9  - TTE complete 6/8: normal EF, mild AR  - CT Coronary 6/7: nonobstructive CAD, small hiatal hernia, kidney cysts   -will follow up with medicine for further eval of kidney cysts  - Atorva 40 started per cardiology recs  [ ] Will consider adding ASA 81mg at outpatient follow up  [ ] Will request outpatient cardiology follow up    #Iron Deficiency Anemia  #Chronic Anemia  ::baseline Hg 9-10, on admission Hg 9.8  ::Folic acid, B12 WNL  ::Iron 36, TIBC 445, %Sat 8%, ferritin 17, RDW 16.9  ::homocysteine 8.17  ::Medicine consulted, signed off on 6/6/24  - C/w home folic acid 1mg  - Started oral Ferrous Sulfate 325mg daily  - Monitor CBC   [ ] MMA pending  [ ] Will request outpatient medicine follow up    #Chronic back pain  #Anxiety  #Insomnia  - Continue home gabapentin 300mg at bedtime  - Continue home trazadone 25mg PRN  - Continue home duloxetine DR 60mg daily  - Lidocaine patch and Diclofenac gel PRN  - Acetaminophen PRN    F: PRN  E: PRN  N: regular diet  DVT ppx: Lovenox  GI ppx: Protonix    Access:  - PIV  - R internal jugular CVC (6/6- )    Joao Casarez M4  [facility]    I participated and contributed to the above medical student note including the HPI, physical exam, assessment, and plan. I agree with the above information.    Kristi Sánchez MD  Neurology PGY2    ------------------------------------------------------------------------------------------------------------------  Medications that should not be used in Myasthenia Gravis    *Absolute Contraindication (are life-threatening):  Curare, D-penicillamine, Botulinum/Botox, Alpha-interferon    *Contraindications (should be avoided):  Antibiotics:   -Aminoglycosides: Gentamycin, Kanamycin, Amikacin, Neomycin, Streptomycin, Tobarmycin,  Netilmycin, Paromomycin, Spectinomycin.  -Anything that ends w/ MYCIN (including Vancomycin).  -Macrolides: Azithromycin (Z-pack), Erythromycin, Claithromycin (Biaxin), Telithromycin.  -Flouroquinolones: Ciprofloxacin (Cipro), Nofloxacin, Levofloxacin.  Antimalarials:  -Chloroquine, Hydroxycholoroquine  Anti-arrhythmics:  -Quinidine, Procainamide, Etafenone, Peruvoside  Magnesium:  -Oral tablets, IV Magnesium replacement.    *Use With Caution:  Antihypertensives:  -CCB: Verapamil, Nifedipine, Felodipine.  -BBC: Propranolol, Atenolol, Acebutolol, Practolol, Oxprenolol, Sotalol, Nadolol and Ophthalmic Timolol.  -Lithium.  -Any new med  -Iodinated Contrast Agents.  -Steroids (especially if high dose and patient is steroid-naiive).    Antibiotics relatively safe in MG:  Penicillins (including Zosyn), Cephalosporins, Carbapenems, Doxycyline, Linezolid

## 2024-06-08 NOTE — PROGRESS NOTES
Subjective Data:  -underwent PLEX yesterday  -no further episodes of CP  -TTE with preserved LV/RV function and mild AI     Objective Data:  Last Recorded Vitals:  Vitals:    06/07/24 1443 06/07/24 1446 06/07/24 2155 06/08/24 0104   BP: 125/79 137/75 129/86 126/76   BP Location:       Patient Position:       Pulse: 78 66 64 69   Resp: 16 16 16 18   Temp: 36.9 °C (98.4 °F) 36.9 °C (98.4 °F) 36.3 °C (97.3 °F) 36.4 °C (97.5 °F)   TempSrc:       SpO2:  93% 93% 95%   Weight:       Height:           Last Labs:  CBC - 6/8/2024:  6:07 AM  8.3 9.9 257    32.3      CMP - 6/8/2024:  6:07 AM  8.6 7.5 27 --- 0.7   3.8 3.6 17 72      PTT - 6/8/2024:  6:07 AM  1.0   11.3 28     TROPHS   Date/Time Value Ref Range Status   06/06/2024 01:11 AM 14 0 - 34 ng/L Final   06/05/2024 11:58 PM 16 0 - 34 ng/L Final     BNP   Date/Time Value Ref Range Status   06/05/2024 11:58 PM 15 0 - 99 pg/mL Final   05/04/2020 05:02 PM 13 0 - 99 pg/mL Final     Comment:     .  <100 pg/mL - Heart failure unlikely  100-299 pg/mL - Intermediate probability of acute heart  .               failure exacerbation. Correlate with clinical  .               context and patient history.    >=300 pg/mL - Heart Failure likely. Correlate with clinical  .               context and patient history.   Biotin interference may cause falsely decreased results.   Patients taking a Biotin dose of up to 5 mg/day should   refrain from taking Biotin for 24 hours before sample   collection. Providers may contact their local laboratory   for further information.       HGBA1C   Date/Time Value Ref Range Status   06/06/2024 03:48 AM 5.7 see below % Final   12/12/2021 04:56 AM 5.5 % Final     Comment:          Diagnosis of Diabetes-Adults   Non-Diabetic: < or = 5.6%   Increased risk for developing diabetes: 5.7-6.4%   Diagnostic of diabetes: > or = 6.5%  .       Monitoring of Diabetes                Age (y)     Therapeutic Goal (%)   Adults:          >18           <7.0   Pediatrics:     "13-18           <7.5                   7-12           <8.0                   0- 6            7.5-8.5   American Diabetes Association. Diabetes Care 33(S1), Jan 2010.       LDLCALC   Date/Time Value Ref Range Status   06/05/2024 11:58 PM 94 <=99 mg/dL Final     Comment:                                 Near   Borderline      AGE      Desirable  Optimal    High     High     Very High     0-19 Y     0 - 109     ---    110-129   >/= 130     ----    20-24 Y     0 - 119     ---    120-159   >/= 160     ----      >24 Y     0 -  99   100-129  130-159   160-189     >/=190       VLDL   Date/Time Value Ref Range Status   06/05/2024 11:58 PM 18 0 - 40 mg/dL Final      Last I/O:  I/O last 3 completed shifts:  In: 2594 (43.3 mL/kg) [Other:2594]  Out: 2409 (40.2 mL/kg) [Other:2409]  Weight: 59.9 kg     Past Cardiology Tests (Last 3 Years):  EKG:  EKG reviewed from 4/5/2020 and 2/28/2019 shows sinus bradycardia with average ventricular rate in 60s, and low voltage QRS.  Isolated Q-wave in lead III  EKG reviewed from 6/5/2024 shows heart rate transition at V4/V5, normal sinus rhythm, no intraventricular conduction delay, AV conduction delay, bundle branch blocks, normal axis.  Isolated Q-wave in lead III, unchanged from prior.     Echo:  No results found for this or any previous visit from the past 1095 days.     Ejection Fractions:  No results found for: \"EF\"  Cath:  No results found for this or any previous visit from the past 1095 days.     Stress Test:  No results found for this or any previous visit from the past 1095 days.     Cardiac Imaging: CCTA 6/7/24  IMPRESSION:  1.  Nonobstructive coronary artery disease with minimal stenosis (1-24%) within the proximal LAD. Remaining coronary arteries are normal.  2. Small hiatal hernia.  3. Incompletely characterized hypodensities in the superior pole of the left kidney may represent cysts. Recommend renal ultrasound for confirmation.    Inpatient Medications:  Scheduled medications "   Medication Dose Route Frequency    atorvastatin  40 mg oral Nightly    cholecalciferol  5,000 Units oral Daily    docusate sodium  100 mg oral BID    DULoxetine  60 mg oral Daily    enoxaparin  40 mg subcutaneous q24h    ferrous sulfate (325 mg ferrous sulfate)  65 mg of iron oral Daily with breakfast    folic acid  1 mg oral Daily    gabapentin  300 mg oral Nightly    lidocaine  1 patch transdermal Daily    methotrexate  20 mg oral Every Sunday    metoprolol tartrate  100 mg oral Once    pantoprazole  40 mg oral Daily    polyethylene glycol  17 g oral Daily    predniSONE  10 mg oral Daily    pyridostigmine  180 mg oral Nightly    pyridostigmine  60 mg oral 5 times per day     PRN medications   Medication    acetaminophen    LORazepam    metoprolol    metoprolol    metoprolol    metoprolol    metoprolol tartrate    traZODone     Continuous Medications   Medication Dose Last Rate       Physical Exam:  Vitals reviewed.   HENT:      Head: Normocephalic.      Right Ear: Tympanic membrane normal.      Nose: Nose normal.   Neck:      Comments: Triple-lumen right internal jugular vein central line in place  Cardiovascular:      Rate and Rhythm: Normal rate and regular rhythm.      Pulses: Normal pulses.      Heart sounds: Normal heart sounds, S1 normal and S2 normal. No murmur heard.     No systolic murmur is present.      No diastolic murmur is present.   Pulmonary:      Effort: Pulmonary effort is normal. No respiratory distress.      Breath sounds: Normal breath sounds. No wheezing or rales.   Abdominal:      General: Abdomen is flat. There is no distension.   Musculoskeletal:         General: Normal range of motion.      Right lower leg: No edema.      Left lower leg: No edema.   Skin:     General: Skin is warm.      Capillary Refill: Capillary refill takes less than 2 seconds.   Neurological:      General: No focal deficit present.      Mental Status: She is alert and oriented to person, place, and time. Mental  status is at baseline.      Assessment/Plan   #Chest pain under evaluation  #Stage 1 HTN  #Hypercholesterolemia  69-year-old female patient being evaluated for 1 time  episode of cardiac chest pain, resolved spontaneously after 5 minutes 1 week ago.  Patient does have significant family history where 3 of her brothers, (2 ), reportedly had myocardial infarction in age of 50s.  No known current history of diabetes, hypertension, dyslipidemia or smoking however 1 time episode of TiA and MRI evidence of small vessel disease often seen in uncontrolled HTN.  Negative troponins and BNP. CCTA negative for obstructive coronary artery disease with mild calcification in the prox LAD. TTE with preserved EF and mild AI.      Recs:  10-year ASCVD risk score is 18.9. Moderate risk. With h/o TiA, age, female, and Stage 1 HTN: Would recommend moderate intensity statin with Atorvastatin 40 mg daily. No interaction with atorvastatin with pyridostigmine and methotrexate.  ASA use per Neuro team given hx of TIA.     We will sign off at this time.     CVC 24 Triple lumen Non-tunneled Right Internal jugular (Active)   Line Necessity Apheresis 24 07   Site Assessment Clean;Dry 24 07   Dressing Status Old drainage 24 07   Dressing Change Due 24 07   Number of days: 2       Peripheral IV 24 20 G Left Forearm (Active)   Site Assessment Clean;Dry;Intact 24 07   Dressing Status Clean;Dry 24 07   Number of days: 2       Code Status:  Full Code    I spent 30 minutes in the professional and overall care of this patient.        Yeny Gilbert MD

## 2024-06-08 NOTE — CARE PLAN
The patient's goals for the shift include      The clinical goals for the shift include pt will remain free from injury      Problem: Pain  Goal: My pain/discomfort is manageable  Outcome: Progressing     Problem: Safety  Goal: Patient will be injury free during hospitalization  Outcome: Progressing  Goal: I will remain free of falls  Outcome: Progressing     Problem: Daily Care  Goal: Daily care needs are met  Outcome: Progressing     Problem: Psychosocial Needs  Goal: Demonstrates ability to cope with hospitalization/illness  Outcome: Progressing  Goal: Collaborate with me, my family, and caregiver to identify my specific goals  Outcome: Progressing     Problem: Discharge Barriers  Goal: My discharge needs are met  Outcome: Progressing     Problem: Pain  Goal: Takes deep breaths with improved pain control throughout the shift  Outcome: Progressing  Goal: Turns in bed with improved pain control throughout the shift  Outcome: Progressing  Goal: Walks with improved pain control throughout the shift  Outcome: Progressing  Goal: Performs ADL's with improved pain control throughout shift  Outcome: Progressing  Goal: Participates in PT with improved pain control throughout the shift  Outcome: Progressing  Goal: Free from opioid side effects throughout the shift  Outcome: Progressing  Goal: Free from acute confusion related to pain meds throughout the shift  Outcome: Progressing

## 2024-06-09 LAB
ALBUMIN SERPL BCP-MCNC: 3.5 G/DL (ref 3.4–5)
ANION GAP SERPL CALC-SCNC: 12 MMOL/L (ref 10–20)
APTT PPP: 27 SECONDS (ref 27–38)
BUN SERPL-MCNC: 19 MG/DL (ref 6–23)
CA-I BLD-SCNC: 1.23 MMOL/L (ref 1.1–1.33)
CALCIUM SERPL-MCNC: 8.8 MG/DL (ref 8.6–10.6)
CHLORIDE SERPL-SCNC: 106 MMOL/L (ref 98–107)
CO2 SERPL-SCNC: 28 MMOL/L (ref 21–32)
CREAT SERPL-MCNC: 0.67 MG/DL (ref 0.5–1.05)
EGFRCR SERPLBLD CKD-EPI 2021: >90 ML/MIN/1.73M*2
ERYTHROCYTE [DISTWIDTH] IN BLOOD BY AUTOMATED COUNT: 17.3 % (ref 11.5–14.5)
FIBRINOGEN PPP-MCNC: 229 MG/DL (ref 200–400)
GLUCOSE SERPL-MCNC: 99 MG/DL (ref 74–99)
HCT VFR BLD AUTO: 31.7 % (ref 36–46)
HGB BLD-MCNC: 10 G/DL (ref 12–16)
HOLD SPECIMEN: NORMAL
INR PPP: 0.9 (ref 0.9–1.1)
MCH RBC QN AUTO: 27.5 PG (ref 26–34)
MCHC RBC AUTO-ENTMCNC: 31.5 G/DL (ref 32–36)
MCV RBC AUTO: 87 FL (ref 80–100)
NRBC BLD-RTO: 0 /100 WBCS (ref 0–0)
PHOSPHATE SERPL-MCNC: 4 MG/DL (ref 2.5–4.9)
PLATELET # BLD AUTO: 256 X10*3/UL (ref 150–450)
POTASSIUM SERPL-SCNC: 3.5 MMOL/L (ref 3.5–5.3)
PROTHROMBIN TIME: 10.6 SECONDS (ref 9.8–12.8)
RBC # BLD AUTO: 3.63 X10*6/UL (ref 4–5.2)
SODIUM SERPL-SCNC: 142 MMOL/L (ref 136–145)
WBC # BLD AUTO: 9 X10*3/UL (ref 4.4–11.3)

## 2024-06-09 PROCEDURE — 82330 ASSAY OF CALCIUM: CPT

## 2024-06-09 PROCEDURE — 1200000002 HC GENERAL ROOM WITH TELEMETRY DAILY

## 2024-06-09 PROCEDURE — 80069 RENAL FUNCTION PANEL: CPT

## 2024-06-09 PROCEDURE — 2500000004 HC RX 250 GENERAL PHARMACY W/ HCPCS (ALT 636 FOR OP/ED)

## 2024-06-09 PROCEDURE — 99231 SBSQ HOSP IP/OBS SF/LOW 25: CPT

## 2024-06-09 PROCEDURE — 2500000001 HC RX 250 WO HCPCS SELF ADMINISTERED DRUGS (ALT 637 FOR MEDICARE OP)

## 2024-06-09 PROCEDURE — 2500000002 HC RX 250 W HCPCS SELF ADMINISTERED DRUGS (ALT 637 FOR MEDICARE OP, ALT 636 FOR OP/ED)

## 2024-06-09 PROCEDURE — 85610 PROTHROMBIN TIME: CPT

## 2024-06-09 PROCEDURE — 2500000004 HC RX 250 GENERAL PHARMACY W/ HCPCS (ALT 636 FOR OP/ED): Performed by: STUDENT IN AN ORGANIZED HEALTH CARE EDUCATION/TRAINING PROGRAM

## 2024-06-09 PROCEDURE — 85027 COMPLETE CBC AUTOMATED: CPT

## 2024-06-09 PROCEDURE — 85384 FIBRINOGEN ACTIVITY: CPT

## 2024-06-09 PROCEDURE — 2500000001 HC RX 250 WO HCPCS SELF ADMINISTERED DRUGS (ALT 637 FOR MEDICARE OP): Performed by: STUDENT IN AN ORGANIZED HEALTH CARE EDUCATION/TRAINING PROGRAM

## 2024-06-09 RX ORDER — TRAZODONE HYDROCHLORIDE 50 MG/1
50 TABLET ORAL NIGHTLY PRN
Status: DISCONTINUED | OUTPATIENT
Start: 2024-06-09 | End: 2024-06-13 | Stop reason: HOSPADM

## 2024-06-09 RX ORDER — METOCLOPRAMIDE HYDROCHLORIDE 5 MG/ML
10 INJECTION INTRAMUSCULAR; INTRAVENOUS ONCE
Status: COMPLETED | OUTPATIENT
Start: 2024-06-09 | End: 2024-06-09

## 2024-06-09 RX ADMIN — PREDNISONE 10 MG: 10 TABLET ORAL at 08:25

## 2024-06-09 RX ADMIN — GABAPENTIN 300 MG: 300 CAPSULE ORAL at 21:34

## 2024-06-09 RX ADMIN — SODIUM CHLORIDE 500 ML: 9 INJECTION, SOLUTION INTRAVENOUS at 09:02

## 2024-06-09 RX ADMIN — ACETAMINOPHEN 650 MG: 325 TABLET ORAL at 04:50

## 2024-06-09 RX ADMIN — PYRIDOSTIGMINE BROMIDE 60 MG: 60 TABLET ORAL at 17:19

## 2024-06-09 RX ADMIN — PANTOPRAZOLE SODIUM 40 MG: 40 TABLET, DELAYED RELEASE ORAL at 08:25

## 2024-06-09 RX ADMIN — FOLIC ACID 1 MG: 1 TABLET ORAL at 08:25

## 2024-06-09 RX ADMIN — PYRIDOSTIGMINE BROMIDE 60 MG: 60 TABLET ORAL at 13:49

## 2024-06-09 RX ADMIN — METOCLOPRAMIDE 10 MG: 5 INJECTION, SOLUTION INTRAMUSCULAR; INTRAVENOUS at 09:02

## 2024-06-09 RX ADMIN — TRAZODONE HYDROCHLORIDE 50 MG: 50 TABLET ORAL at 21:34

## 2024-06-09 RX ADMIN — PYRIDOSTIGMINE BROMIDE 60 MG: 60 TABLET ORAL at 01:29

## 2024-06-09 RX ADMIN — PYRIDOSTIGMINE BROMIDE 180 MG: 180 TABLET ORAL at 21:33

## 2024-06-09 RX ADMIN — PYRIDOSTIGMINE BROMIDE 60 MG: 60 TABLET ORAL at 04:21

## 2024-06-09 RX ADMIN — FERROUS SULFATE TAB 325 MG (65 MG ELEMENTAL FE) 1 TABLET: 325 (65 FE) TAB at 08:25

## 2024-06-09 RX ADMIN — DULOXETINE HYDROCHLORIDE 60 MG: 60 CAPSULE, DELAYED RELEASE ORAL at 08:24

## 2024-06-09 RX ADMIN — ENOXAPARIN SODIUM 40 MG: 40 INJECTION, SOLUTION SUBCUTANEOUS at 08:22

## 2024-06-09 RX ADMIN — PYRIDOSTIGMINE BROMIDE 60 MG: 60 TABLET ORAL at 08:24

## 2024-06-09 RX ADMIN — Medication 5000 UNITS: at 08:25

## 2024-06-09 RX ADMIN — ACETAMINOPHEN 650 MG: 325 TABLET ORAL at 21:33

## 2024-06-09 RX ADMIN — ACETAMINOPHEN 650 MG: 325 TABLET ORAL at 13:49

## 2024-06-09 ASSESSMENT — COGNITIVE AND FUNCTIONAL STATUS - GENERAL
MOBILITY SCORE: 22
CLIMB 3 TO 5 STEPS WITH RAILING: A LOT

## 2024-06-09 ASSESSMENT — PAIN SCALES - GENERAL
PAINLEVEL_OUTOF10: 8
PAINLEVEL_OUTOF10: 5 - MODERATE PAIN
PAINLEVEL_OUTOF10: 0 - NO PAIN
PAINLEVEL_OUTOF10: 0 - NO PAIN

## 2024-06-09 ASSESSMENT — PAIN DESCRIPTION - LOCATION: LOCATION: HEAD

## 2024-06-09 ASSESSMENT — PAIN - FUNCTIONAL ASSESSMENT
PAIN_FUNCTIONAL_ASSESSMENT: 0-10

## 2024-06-09 ASSESSMENT — PAIN DESCRIPTION - ORIENTATION: ORIENTATION: RIGHT

## 2024-06-09 NOTE — PROGRESS NOTES
Page Huston is a 69 y.o. female on day 2 of admission presenting with Chest pain, unspecified type.       Subjective   No overnight events. Pt says she experienced an episode of dizziness this morning while sitting up. Endorses that she is drinking fluids. SOB on exertion has improved, pt said she was able to take a shower without noticing SOB. Left ptosis still present. Back pain unchanged. No substernal chest pain. Hoarseness present but unchanged.         Objective     Last Recorded Vitals  /61   Pulse 77   Temp 36.2 °C (97.2 °F)   Resp 20   Wt 59.9 kg (132 lb)   SpO2 97%   Intake/Output last 3 Shifts:  No intake or output data in the 24 hours ending 06/09/24 0855    Admission Weight  Weight: 59.9 kg (132 lb) (06/05/24 2119)    Daily Weight  06/05/24 : 59.9 kg (132 lb)    Image Results  Transthoracic Echo (TTE) Complete     [facility], [address] [place] [address]                 Tel [phone] and Fax [phone]    TRANSTHORACIC ECHOCARDIOGRAM REPORT       Patient Name:      PAGE HUSTON           Reading Physician:    [id number] Lan Santos MD  Study Date:        6/8/2024             Ordering Provider:    [id number] SHANTEL BHATIA  MRN/PID:           [id number]             Fellow:  Accession#:        [id number]         Nurse:                Alyce Bonilla RN  Date of Birth/Age: 1955 / 69 years  Sonographer:          Ruthann Andrade                                                                YOMI  Gender:            F                    Additional Staff:  Height:            160.02 cm            Admit Date:           6/5/2024  Weight:            59.88 kg             Admission Status:     Inpatient -                                                                Priority discharge  BSA / BMI:         1.62 m2 / 23.38      Encounter#:           [id number]                     kg/m2                                           Department Location:  [facility] Non                                                                Invasive  Blood Pressure: 160 /94 mmHg    Study Type:    TRANSTHORACIC ECHO (TTE) COMPLETE  Diagnosis/ICD: Chest pain, unspecified-R07.9  Indication:    Exertional dyspnea  CPT Code:      Echo Complete w Full Doppler-[id number]    Patient History:  Pertinent History: Aneurysm; Stroke; Chest pain; Myasthenia Gravis; Anemia; POWERS;                     Abnormal EKG.    Study Detail: The following Echo studies were performed: 2D, M-Mode, Doppler and                color flow. Agitated saline used as a contrast agent for                intraseptal flow evaluation.       PHYSICIAN INTERPRETATION:  Left Ventricle: The left ventricular systolic function is normal, with an estimated ejection fraction of 70%. There are no regional wall motion abnormalities. The left ventricular cavity size is normal. Spectral Doppler shows a normal pattern of left ventricular diastolic filling. There are normal left ventricular filling pressures. There is anterobasal septal hypertrophy with a sigmoid septum.  Left Atrium: The left atrium is upper limits of normal in size. A bubble study using agitated saline was very technically difficult and unable to be interpreted.  Right Ventricle: The right ventricle is normal in size. There is normal right ventricular global systolic function.  Right Atrium: The right atrium is normal in size.  Aortic Valve: The aortic valve is trileaflet. There is mild aortic valve cusp calcification. There is mild aortic valve regurgitation. The peak instantaneous gradient of the aortic valve is 8.9 mmHg. The mean gradient of the aortic valve is 4.0 mmHg.  Mitral Valve: The mitral valve is mildly thickened. There is mild mitral valve regurgitation.  Tricuspid Valve: The tricuspid valve is structurally normal. There is mild tricuspid regurgitation. The Doppler estimated RVSP is within normal limits at 25.8 mmHg.  Pulmonic  Valve: The pulmonic valve is structurally normal. There is physiologic pulmonic valve regurgitation.  Pericardium: There is a trivial pericardial effusion.  Aorta: The aortic root is normal. The aortic root is at the upper limits of normal size.  Systemic Veins: The inferior vena cava appears to be of normal size. There is IVC inspiratory collapse greater than 50%.  In comparison to the previous echocardiogram(s): There are no prior studies on this patient for comparison purposes.       CONCLUSIONS:   1. Left ventricular systolic function is normal with a 70% estimated ejection fraction.   2. There is anterobasal septal hypertrophy with a sigmoid septum.   3. RVSP within normal limits.   4. Mild aortic valve regurgitation.    QUANTITATIVE DATA SUMMARY:  2D MEASUREMENTS:                           Normal Ranges:  Ao Root d:     3.60 cm   (2.0-3.7cm)  LAs:           3.70 cm   (2.7-4.0cm)  IVSd:          0.90 cm   (0.6-1.1cm)  LVPWd:         0.70 cm   (0.6-1.1cm)  LVIDd:         4.20 cm   (3.9-5.9cm)  LVIDs:         2.60 cm  LV Mass Index: 62.5 g/m2  LV % FS        38.1 %    LA VOLUME:                              Normal Ranges:  LA Volume Index: 33.5 ml/m2    RA VOLUME BY A/L METHOD:                        Normal Ranges:  RA Area A4C: 11.9 cm2    AORTA MEASUREMENTS:                     Normal Ranges:  Asc Ao, d: 3.00 cm (2.1-3.4cm)    LV SYSTOLIC FUNCTION BY 2D PLANIMETRY (MOD):                      Normal Ranges:  EF-A4C View: 76.2 % (>=55%)  EF-A2C View: 77.9 %  EF-Biplane:  76.9 %    LV DIASTOLIC FUNCTION:                                Normal Ranges:  MV Peak E:        0.68 m/s    (0.7-1.2 m/s)  MV Peak A:        0.70 m/s    (0.42-0.7 m/s)  E/A Ratio:        0.98        (1.0-2.2)  MV e'             0.08 m/s    (>8.0)  MV lateral e'     0.09 m/s  MV medial e'      0.07 m/s  MV A Dur:         137.50 msec  E/e' Ratio:       8.54        (<8.0)  MV DT:            216 msec    (150-240 msec)  PulmV Sys Lul:    43.20  cm/s  PulmV Rivas Lul:   40.40 cm/s  PulmV S/D Lul:    1.10  PulmV A Revs Lul: 24.30 cm/s  PulmV A Revs Dur: 148.00 msec    MITRAL VALVE:                  Normal Ranges:  MV DT: 216 msec (150-240msec)    MITRAL INSUFFICIENCY:                       Normal Ranges:  MR VTI:  175.00 cm  MR Vmax: 543.00 cm/s    AORTIC VALVE:                                    Normal Ranges:  AoV Vmax:                1.49 m/s (<=1.7m/s)  AoV Peak P.9 mmHg (<20mmHg)  AoV Mean P.0 mmHg (1.7-11.5mmHg)  LVOT Max Lul:            1.40 m/s (<=1.1m/s)  AoV VTI:                 29.00 cm (18-25cm)  LVOT VTI:                29.80 cm  LVOT Diameter:           2.00 cm  (1.8-2.4cm)  AoV Area, VTI:           3.23 cm2 (2.5-5.5cm2)  AoV Area,Vmax:           2.95 cm2 (2.5-4.5cm2)  AoV Dimensionless Index: 1.03    AORTIC INSUFFICIENCY:  AI Vmax:       4.42 m/s  AI Half-time:  593 msec  AI Decel Rate: 185.00 cm/s2       RIGHT VENTRICLE:  RV Basal 3.30 cm  RV Mid   2.50 cm  RV Major 6.1 cm  TAPSE:   17.9 mm  RV s'    0.12 m/s    TRICUSPID VALVE/RVSP:                              Normal Ranges:  Peak TR Velocity: 2.39 m/s  RV Syst Pressure: 25.8 mmHg (< 30mmHg)    PULMONIC VALVE:                       Normal Ranges:  PV Max Lul: 1.1 m/s  (0.6-0.9m/s)  PV Max P.0 mmHg    Pulmonary Veins:  PulmV A Revs Dur: 148.00 msec  PulmV A Revs Lul: 24.30 cm/s  PulmV Rivas Lul:   40.40 cm/s  PulmV S/D Lul:    1.10  PulmV Sys Lul:    43.20 cm/s       [id number] Lan Santos MD  Electronically signed on 2024 at 9:23:03 AM       ** Final **      Physical Exam  MG-specific exam:  Ptosis on sustained upgaze bilaterally, fluctuating ptosis of L eye  Single breath count: 22  Neck flexion: 5-/5  Neck extension: 4/5  +mild hoarseness  Most recent Negative Inspiratory Force (NIF): -50 (24 0935)      Mental State:  Orientation: A&Ox4  Language: Intact for comprehension, repetition, expression, naming  Thought processes: Logical,  "organized  Concentration: Intact  Fund of knowledge: Appropriate    Cranial Nerves  - I/III: PERRL  - II: Visual fields intact to confrontation bilaterally tested, individually, and together.  Diplopia on upgaze and lateral gaze, not present at midline.  - III, IV, VI: EOM full to pursuit without nystagmus. Ptosis of left eyelid.   - V: V1-V3 sensation intact bilaterally  - VII: Face muscles symmetric with smile, eye closure, eyebrow raising, and cheek puffing  - VIII: Intact to interview  - IX, X: Palate elevated symmetrically, bilaterally. Hoarseness of voice  - XI: 5/5 strength on shoulder shrug bilaterally  - XII: Tongue midline without atrophy or fasciculations, no thrush     MOTOR EXAM:  Muscle bulk and tone were normal in UE and LE  No fasciculations, tremor, or other abnormal movements present     STRENGTH:      R          L  Deltoid             5       5-  Biceps              5        5  Triceps             5        5                   5         5     Hip flexion       5-       5-  Quadriceps      5         5  Hamstrings      5         5  DorsiFlex          5-        5-  PlantarFlex       5-        5-     REFLEXES:        R          L  Biceps              3          3  Brachioradialis 3          3  Patellar             3         3  Achilles            2          2  Plantar             Down      Down  No clonus     SENSORY: Intact to light touch in bilateral UE and LE            Assessment/Plan   Page Huston is a 69 y.o. female with a PMHx of seronegative myasthenia gravis (follows with Dr. Shipley), iron deficiency anemia (pending outpatient endoscopy with GI to evaluate for bleed) who presents to [facility] for worsening dyspnea on exertion, chest pain and reported \"abnormal EKG\" c/f MG exacerbation vs cardiac etiology. Admitted to neurology for MG exacerbation. Planning for 3 treatments of PLEX (6/6, 6/7, 6/10).     Pt has continued fatigue, mild neck flex/ext weakness, weakness LE>UE, ptosis L>R, and " hoarseness of voice that may be attributed to MG exacerbation/flare. Pt reports improved dyspnea on exertion and denies substernal chest pain. Normal TTE per cardiology. Will need medicine and cardiology follow up outpatient. Pt also has continued SOB, recommend to see outpatient pulmonology. Iron deficiency anemia may be contributing to her fatigue and shortness of breath, continue ferrous sulfate 325 mg PO per medicine team.    UPDATES 6/9:  - Final (3rd) session of PLEX tomorrow 6/10  - MMA pending  [ ] Patient will require outpatient Cardiology and Medicine follow-up  [ ] Consider outpatient pulmonology evaluation for ongoing SOB    Plan  #MG exacerbation  #Seronegative MG  - Outpatient neuromuscular specialist, Dr Shipley, aware of admission  - Hold IVIG to avoid risk of hypercoagulation in the setting of a potential cardiac etiology for SOB  - NIF/VC q6h  Most recent result: Negative Inspiratory Force (NIF): -50 (06/08/24 0935)   - PLEX x 3 days (6/6, 6/7, 6/10)  - C/w home mestinon IR 60q4 and ER 180mg at bedtime  - C/w home methotrexate 20mg qweek and prednisone 10mg  - LRP4 Ab sent per Dr Shipley recs  - Will follow up with neuromuscular outpatient      #Exertional Dyspnea  #Chest pain  #c/f unstable angina  ::Trops and BNP WNL  ::LDL 94, A1C 5.7  ::TSH WNL  ::EKG with isolated nonpathologic Q wave in lead III (stable since 2019)  ::Cardiology signed off 6/8  ::ASCVD risk 18.9  - TTE complete 6/8: normal EF, mild AR  - CT Coronary 6/7: nonobstructive CAD, small hiatal hernia, kidney cysts              -will follow up with medicine for further eval of kidney cysts  - Atorva 40 started per cardiology recs  [ ] Will consider adding ASA 81mg at outpatient follow up  [ ] Will request outpatient cardiology follow up  [ ] Consider outpatient pulmonology for continued SOB     #Iron Deficiency Anemia  #Chronic Anemia  ::baseline Hg 9-10, on admission Hg 9.8  ::Folic acid, B12 WNL  ::Iron 36, TIBC 445, %Sat 8%,  ferritin 17, RDW 16.9  ::homocysteine 8.17  ::Medicine consulted, signed off on 6/6/24  - C/w home folic acid 1mg  - Started oral Ferrous Sulfate 325mg daily  - Monitor CBC   [ ] MMA pending  [ ] Will request outpatient medicine follow up     #Chronic back pain  #Anxiety  #Insomnia  - Continue home gabapentin 300mg at bedtime  - Continue home trazadone 25mg PRN  - Continue home duloxetine DR 60mg daily  - Lidocaine patch and Diclofenac gel PRN  - Acetaminophen PRN     F: PRN  E: PRN  N: regular diet  DVT ppx: Lovenox  GI ppx: Protonix     Access:  - PIV  - R internal jugular CVC (6/6- )     Joao Momin-Lorraine M4  [facility]     I participated and contributed to the above medical student note including the HPI, physical exam, assessment, and plan. I agree with the above information.     Kristi Sánchez MD  Neurology PGY2     ------------------------------------------------------------------------------------------------------------------  Medications that should not be used in Myasthenia Gravis     *Absolute Contraindication (are life-threatening):  Curare, D-penicillamine, Botulinum/Botox, Alpha-interferon     *Contraindications (should be avoided):  Antibiotics:   -Aminoglycosides: Gentamycin, Kanamycin, Amikacin, Neomycin, Streptomycin, Tobarmycin, Netilmycin, Paromomycin, Spectinomycin.  -Anything that ends w/ MYCIN (including Vancomycin).  -Macrolides: Azithromycin (Z-pack), Erythromycin, Claithromycin (Biaxin), Telithromycin.  -Flouroquinolones: Ciprofloxacin (Cipro), Nofloxacin, Levofloxacin.  Antimalarials:  -Chloroquine, Hydroxycholoroquine  Anti-arrhythmics:  -Quinidine, Procainamide, Etafenone, Peruvoside  Magnesium:  -Oral tablets, IV Magnesium replacement.     *Use With Caution:  Antihypertensives:  -CCB: Verapamil, Nifedipine, Felodipine.  -BBC: Propranolol, Atenolol, Acebutolol, Practolol, Oxprenolol, Sotalol, Nadolol and Ophthalmic Timolol.  -Lithium.  -Any new med  -Iodinated Contrast Agents.  -Steroids  (especially if high dose and patient is steroid-naiive).     Antibiotics relatively safe in MG:  Penicillins (including Zosyn), Cephalosporins, Carbapenems, Doxycyline, Linezolid     KRISTIN ARMENDARIZ, MS3  [facility]

## 2024-06-09 NOTE — PROGRESS NOTES
Respiratory Therapy Note    NIF and VC not done at this time, pt refused. Pt complained of soreness and pain

## 2024-06-10 ENCOUNTER — APPOINTMENT (OUTPATIENT)
Dept: OTHER | Facility: HOSPITAL | Age: 69
End: 2024-06-10
Payer: COMMERCIAL

## 2024-06-10 LAB
ALBUMIN SERPL BCP-MCNC: 3.6 G/DL (ref 3.4–5)
ANION GAP SERPL CALC-SCNC: 12 MMOL/L (ref 10–20)
APTT PPP: 26 SECONDS (ref 27–38)
BASOPHILS # BLD AUTO: 0.07 X10*3/UL (ref 0–0.1)
BASOPHILS NFR BLD AUTO: 0.9 %
BLOOD EXPIRATION DATE: NORMAL
BUN SERPL-MCNC: 18 MG/DL (ref 6–23)
CA-I BLD-SCNC: 1.08 MMOL/L (ref 1.1–1.33)
CA-I BLD-SCNC: 1.23 MMOL/L (ref 1.1–1.33)
CALCIUM SERPL-MCNC: 8.8 MG/DL (ref 8.6–10.6)
CHLORIDE SERPL-SCNC: 106 MMOL/L (ref 98–107)
CO2 SERPL-SCNC: 26 MMOL/L (ref 21–32)
COMPONENT CODE: NORMAL
CREAT SERPL-MCNC: 0.64 MG/DL (ref 0.5–1.05)
DISPENSE STATUS: NORMAL
EGFRCR SERPLBLD CKD-EPI 2021: >90 ML/MIN/1.73M*2
EOSINOPHIL # BLD AUTO: 0.19 X10*3/UL (ref 0–0.4)
EOSINOPHIL NFR BLD AUTO: 2.3 %
ERYTHROCYTE [DISTWIDTH] IN BLOOD BY AUTOMATED COUNT: 17.4 % (ref 11.5–14.5)
ERYTHROCYTE [DISTWIDTH] IN BLOOD BY AUTOMATED COUNT: 17.4 % (ref 11.5–14.5)
FIBRINOGEN PPP-MCNC: 225 MG/DL (ref 200–400)
GLUCOSE SERPL-MCNC: 89 MG/DL (ref 74–99)
HCT VFR BLD AUTO: 29.4 % (ref 36–46)
HCT VFR BLD AUTO: 31.6 % (ref 36–46)
HGB BLD-MCNC: 9.6 G/DL (ref 12–16)
HGB BLD-MCNC: 9.9 G/DL (ref 12–16)
IMM GRANULOCYTES # BLD AUTO: 0.03 X10*3/UL (ref 0–0.5)
IMM GRANULOCYTES NFR BLD AUTO: 0.4 % (ref 0–0.9)
INR PPP: 0.9 (ref 0.9–1.1)
LYMPHOCYTES # BLD AUTO: 3.09 X10*3/UL (ref 0.8–3)
LYMPHOCYTES NFR BLD AUTO: 37.7 %
MAGNESIUM SERPL-MCNC: 1.99 MG/DL (ref 1.6–2.4)
MCH RBC QN AUTO: 27.2 PG (ref 26–34)
MCH RBC QN AUTO: 27.5 PG (ref 26–34)
MCHC RBC AUTO-ENTMCNC: 31.3 G/DL (ref 32–36)
MCHC RBC AUTO-ENTMCNC: 32.7 G/DL (ref 32–36)
MCV RBC AUTO: 84 FL (ref 80–100)
MCV RBC AUTO: 87 FL (ref 80–100)
METHYLMALONATE SERPL-SCNC: <0.1 UMOL/L (ref 0–0.4)
MONOCYTES # BLD AUTO: 0.66 X10*3/UL (ref 0.05–0.8)
MONOCYTES NFR BLD AUTO: 8 %
NEUTROPHILS # BLD AUTO: 4.16 X10*3/UL (ref 1.6–5.5)
NEUTROPHILS NFR BLD AUTO: 50.7 %
NRBC BLD-RTO: 0 /100 WBCS (ref 0–0)
NRBC BLD-RTO: 0 /100 WBCS (ref 0–0)
PHOSPHATE SERPL-MCNC: 4.4 MG/DL (ref 2.5–4.9)
PLATELET # BLD AUTO: 264 X10*3/UL (ref 150–450)
PLATELET # BLD AUTO: 282 X10*3/UL (ref 150–450)
POTASSIUM SERPL-SCNC: 3.4 MMOL/L (ref 3.5–5.3)
PRODUCT BLOOD TYPE: 5100
PRODUCT BLOOD TYPE: 5100
PRODUCT BLOOD TYPE: 9500
PRODUCT BLOOD TYPE: 9500
PRODUCT CODE: NORMAL
PROTHROMBIN TIME: 10.3 SECONDS (ref 9.8–12.8)
RBC # BLD AUTO: 3.49 X10*6/UL (ref 4–5.2)
RBC # BLD AUTO: 3.64 X10*6/UL (ref 4–5.2)
SODIUM SERPL-SCNC: 141 MMOL/L (ref 136–145)
UNIT ABO: NORMAL
UNIT NUMBER: NORMAL
UNIT RH: NORMAL
UNIT VOLUME: 312
UNIT VOLUME: 325
UNIT VOLUME: 337
UNIT VOLUME: 361
WBC # BLD AUTO: 8.2 X10*3/UL (ref 4.4–11.3)
WBC # BLD AUTO: 8.7 X10*3/UL (ref 4.4–11.3)

## 2024-06-10 PROCEDURE — 2500000001 HC RX 250 WO HCPCS SELF ADMINISTERED DRUGS (ALT 637 FOR MEDICARE OP): Performed by: STUDENT IN AN ORGANIZED HEALTH CARE EDUCATION/TRAINING PROGRAM

## 2024-06-10 PROCEDURE — 36514 APHERESIS PLASMA: CPT

## 2024-06-10 PROCEDURE — 86078 PHYS BLOOD BANK SERV REACTJ: CPT

## 2024-06-10 PROCEDURE — 80069 RENAL FUNCTION PANEL: CPT

## 2024-06-10 PROCEDURE — 85027 COMPLETE CBC AUTOMATED: CPT

## 2024-06-10 PROCEDURE — 82330 ASSAY OF CALCIUM: CPT

## 2024-06-10 PROCEDURE — 85025 COMPLETE CBC W/AUTO DIFF WBC: CPT

## 2024-06-10 PROCEDURE — P9017 PLASMA 1 DONOR FRZ W/IN 8 HR: HCPCS

## 2024-06-10 PROCEDURE — P9045 ALBUMIN (HUMAN), 5%, 250 ML: HCPCS | Mod: JZ

## 2024-06-10 PROCEDURE — 82330 ASSAY OF CALCIUM: CPT | Mod: 91

## 2024-06-10 PROCEDURE — 2500000004 HC RX 250 GENERAL PHARMACY W/ HCPCS (ALT 636 FOR OP/ED): Performed by: STUDENT IN AN ORGANIZED HEALTH CARE EDUCATION/TRAINING PROGRAM

## 2024-06-10 PROCEDURE — 2500000001 HC RX 250 WO HCPCS SELF ADMINISTERED DRUGS (ALT 637 FOR MEDICARE OP)

## 2024-06-10 PROCEDURE — 85610 PROTHROMBIN TIME: CPT

## 2024-06-10 PROCEDURE — 83735 ASSAY OF MAGNESIUM: CPT

## 2024-06-10 PROCEDURE — 1200000002 HC GENERAL ROOM WITH TELEMETRY DAILY

## 2024-06-10 PROCEDURE — 2500000004 HC RX 250 GENERAL PHARMACY W/ HCPCS (ALT 636 FOR OP/ED)

## 2024-06-10 PROCEDURE — 85384 FIBRINOGEN ACTIVITY: CPT

## 2024-06-10 PROCEDURE — 2500000004 HC RX 250 GENERAL PHARMACY W/ HCPCS (ALT 636 FOR OP/ED): Mod: JZ

## 2024-06-10 PROCEDURE — 2500000002 HC RX 250 W HCPCS SELF ADMINISTERED DRUGS (ALT 637 FOR MEDICARE OP, ALT 636 FOR OP/ED): Mod: MUE

## 2024-06-10 PROCEDURE — 99231 SBSQ HOSP IP/OBS SF/LOW 25: CPT

## 2024-06-10 RX ORDER — DIPHENHYDRAMINE HCL 25 MG
50 CAPSULE ORAL ONCE
Status: COMPLETED | OUTPATIENT
Start: 2024-06-10 | End: 2024-06-10

## 2024-06-10 RX ORDER — ALBUMIN HUMAN 50 G/1000ML
12.5 SOLUTION INTRAVENOUS
Status: COMPLETED | OUTPATIENT
Start: 2024-06-10 | End: 2024-06-10

## 2024-06-10 RX ORDER — FERROUS SULFATE 325(65) MG
65 TABLET ORAL
Qty: 30 TABLET | Refills: 2 | Status: SHIPPED | OUTPATIENT
Start: 2024-06-11 | End: 2024-06-13

## 2024-06-10 RX ORDER — HEPARIN SODIUM 1000 [USP'U]/ML
1100 INJECTION, SOLUTION INTRAVENOUS; SUBCUTANEOUS ONCE
Status: COMPLETED | OUTPATIENT
Start: 2024-06-10 | End: 2024-06-10

## 2024-06-10 RX ORDER — POTASSIUM CHLORIDE 20 MEQ/1
40 TABLET, EXTENDED RELEASE ORAL ONCE
Status: COMPLETED | OUTPATIENT
Start: 2024-06-10 | End: 2024-06-10

## 2024-06-10 RX ORDER — DIPHENHYDRAMINE HYDROCHLORIDE 50 MG/ML
25 INJECTION INTRAMUSCULAR; INTRAVENOUS EVERY 5 MIN PRN
Status: DISCONTINUED | OUTPATIENT
Start: 2024-06-10 | End: 2024-06-10 | Stop reason: HOSPADM

## 2024-06-10 RX ORDER — ACETAMINOPHEN 500 MG
5000 TABLET ORAL DAILY
Qty: 30 TABLET | Refills: 2 | Status: SHIPPED | OUTPATIENT
Start: 2024-06-11 | End: 2024-06-13

## 2024-06-10 RX ORDER — ASPIRIN 81 MG/1
81 TABLET ORAL DAILY
Qty: 30 TABLET | Refills: 2 | Status: SHIPPED | OUTPATIENT
Start: 2024-06-11 | End: 2024-06-13

## 2024-06-10 RX ORDER — CALCIUM CARBONATE 200(500)MG
1500 TABLET,CHEWABLE ORAL EVERY 5 MIN PRN
Status: DISCONTINUED | OUTPATIENT
Start: 2024-06-10 | End: 2024-06-10 | Stop reason: HOSPADM

## 2024-06-10 RX ORDER — ASPIRIN 81 MG/1
81 TABLET ORAL DAILY
Status: DISCONTINUED | OUTPATIENT
Start: 2024-06-10 | End: 2024-06-13 | Stop reason: HOSPADM

## 2024-06-10 RX ORDER — ATORVASTATIN CALCIUM 40 MG/1
40 TABLET, FILM COATED ORAL NIGHTLY
Qty: 30 TABLET | Refills: 2 | Status: SHIPPED | OUTPATIENT
Start: 2024-06-10 | End: 2024-06-13

## 2024-06-10 RX ORDER — CALCIUM GLUCONATE 20 MG/ML
3839 INJECTION, SOLUTION INTRAVENOUS ONCE
Status: COMPLETED | OUTPATIENT
Start: 2024-06-10 | End: 2024-06-10

## 2024-06-10 RX ORDER — ACETAMINOPHEN 325 MG/1
650 TABLET ORAL ONCE
Status: COMPLETED | OUTPATIENT
Start: 2024-06-10 | End: 2024-06-10

## 2024-06-10 RX ADMIN — HEPARIN SODIUM 1100 UNITS: 1000 INJECTION INTRAVENOUS; SUBCUTANEOUS at 16:03

## 2024-06-10 RX ADMIN — DIPHENHYDRAMINE HYDROCHLORIDE 25 MG: 50 INJECTION, SOLUTION INTRAMUSCULAR; INTRAVENOUS at 15:30

## 2024-06-10 RX ADMIN — PYRIDOSTIGMINE BROMIDE 60 MG: 60 TABLET ORAL at 17:52

## 2024-06-10 RX ADMIN — DOCUSATE SODIUM 100 MG: 100 CAPSULE, LIQUID FILLED ORAL at 21:19

## 2024-06-10 RX ADMIN — ALBUMIN HUMAN 12.5 G: 0.05 INJECTION, SOLUTION INTRAVENOUS at 13:40

## 2024-06-10 RX ADMIN — PYRIDOSTIGMINE BROMIDE 60 MG: 60 TABLET ORAL at 21:19

## 2024-06-10 RX ADMIN — GABAPENTIN 300 MG: 300 CAPSULE ORAL at 21:19

## 2024-06-10 RX ADMIN — ALBUMIN HUMAN 12.5 G: 0.05 INJECTION, SOLUTION INTRAVENOUS at 14:09

## 2024-06-10 RX ADMIN — PYRIDOSTIGMINE BROMIDE 60 MG: 60 TABLET ORAL at 09:30

## 2024-06-10 RX ADMIN — FOLIC ACID 1 MG: 1 TABLET ORAL at 08:41

## 2024-06-10 RX ADMIN — TRAZODONE HYDROCHLORIDE 50 MG: 50 TABLET ORAL at 21:19

## 2024-06-10 RX ADMIN — ACETAMINOPHEN 650 MG: 325 TABLET ORAL at 13:17

## 2024-06-10 RX ADMIN — DOCUSATE SODIUM 100 MG: 100 CAPSULE, LIQUID FILLED ORAL at 08:41

## 2024-06-10 RX ADMIN — ALBUMIN HUMAN 12.5 G: 0.05 INJECTION, SOLUTION INTRAVENOUS at 13:47

## 2024-06-10 RX ADMIN — FERROUS SULFATE TAB 325 MG (65 MG ELEMENTAL FE) 1 TABLET: 325 (65 FE) TAB at 08:41

## 2024-06-10 RX ADMIN — PYRIDOSTIGMINE BROMIDE 60 MG: 60 TABLET ORAL at 05:11

## 2024-06-10 RX ADMIN — DULOXETINE HYDROCHLORIDE 60 MG: 60 CAPSULE, DELAYED RELEASE ORAL at 08:41

## 2024-06-10 RX ADMIN — PANTOPRAZOLE SODIUM 40 MG: 40 TABLET, DELAYED RELEASE ORAL at 08:41

## 2024-06-10 RX ADMIN — PYRIDOSTIGMINE BROMIDE 180 MG: 180 TABLET ORAL at 21:19

## 2024-06-10 RX ADMIN — ASPIRIN 81 MG: 81 TABLET, COATED ORAL at 11:22

## 2024-06-10 RX ADMIN — Medication 5000 UNITS: at 08:41

## 2024-06-10 RX ADMIN — ALBUMIN HUMAN 12.5 G: 0.05 INJECTION, SOLUTION INTRAVENOUS at 14:16

## 2024-06-10 RX ADMIN — ENOXAPARIN SODIUM 40 MG: 40 INJECTION, SOLUTION SUBCUTANEOUS at 08:41

## 2024-06-10 RX ADMIN — ACETAMINOPHEN 650 MG: 325 TABLET ORAL at 21:19

## 2024-06-10 RX ADMIN — POTASSIUM CHLORIDE 40 MEQ: 1500 TABLET, EXTENDED RELEASE ORAL at 11:12

## 2024-06-10 RX ADMIN — PREDNISONE 10 MG: 10 TABLET ORAL at 08:41

## 2024-06-10 RX ADMIN — CALCIUM GLUCONATE 3839 MG: 20 INJECTION, SOLUTION INTRAVENOUS at 13:29

## 2024-06-10 RX ADMIN — PYRIDOSTIGMINE BROMIDE 60 MG: 60 TABLET ORAL at 13:12

## 2024-06-10 RX ADMIN — ALBUMIN HUMAN 12.5 G: 0.05 INJECTION, SOLUTION INTRAVENOUS at 14:02

## 2024-06-10 RX ADMIN — DIPHENHYDRAMINE HYDROCHLORIDE 50 MG: 25 CAPSULE ORAL at 13:17

## 2024-06-10 ASSESSMENT — COGNITIVE AND FUNCTIONAL STATUS - GENERAL
DAILY ACTIVITIY SCORE: 24
MOBILITY SCORE: 24
DAILY ACTIVITIY SCORE: 24
MOBILITY SCORE: 24

## 2024-06-10 ASSESSMENT — PAIN DESCRIPTION - DESCRIPTORS: DESCRIPTORS: PATIENT UNABLE TO DESCRIBE

## 2024-06-10 ASSESSMENT — PAIN SCALES - GENERAL
PAINLEVEL_OUTOF10: 7
PAINLEVEL_OUTOF10: 0 - NO PAIN

## 2024-06-10 ASSESSMENT — PAIN - FUNCTIONAL ASSESSMENT
PAIN_FUNCTIONAL_ASSESSMENT: 0-10
PAIN_FUNCTIONAL_ASSESSMENT: 0-10

## 2024-06-10 NOTE — CARE PLAN
The patient's goals for the shift include      The clinical goals for the shift include pt will remain free from injury    Problem: Pain  Goal: My pain/discomfort is manageable  Outcome: Progressing     Problem: Safety  Goal: Patient will be injury free during hospitalization  Outcome: Progressing  Goal: I will remain free of falls  Outcome: Progressing     Problem: Daily Care  Goal: Daily care needs are met  Outcome: Progressing     Problem: Psychosocial Needs  Goal: Demonstrates ability to cope with hospitalization/illness  Outcome: Progressing  Goal: Collaborate with me, my family, and caregiver to identify my specific goals  Outcome: Progressing     Problem: Discharge Barriers  Goal: My discharge needs are met  Outcome: Progressing     Problem: Discharge Barriers  Goal: My discharge needs are met  Outcome: Progressing     Problem: Pain  Goal: Takes deep breaths with improved pain control throughout the shift  Outcome: Progressing  Goal: Turns in bed with improved pain control throughout the shift  Outcome: Progressing  Goal: Walks with improved pain control throughout the shift  Outcome: Progressing  Goal: Performs ADL's with improved pain control throughout shift  Outcome: Progressing  Goal: Participates in PT with improved pain control throughout the shift  Outcome: Progressing  Goal: Free from opioid side effects throughout the shift  Outcome: Progressing  Goal: Free from acute confusion related to pain meds throughout the shift  Outcome: Progressing

## 2024-06-10 NOTE — DISCHARGE INSTRUCTIONS
Ms. Page Huston,    You were admitted for a myasthenia gravis exacerbation. Your symptoms included shortness of breath with movement and with talking, chest pain, worsened eyelid drooping more on the left than on the right, neck flexion/extension weakness, hoarseness of voice, worsened double vision, fatigue, and weakness.     Cardiology was consulted, and they found that your echocardiogram was normal and your heart CT showed non-obstructive coronary artery disease. Due to your history of a possible transient ischemic attack (TIA) in August 2023, age, being female, and Stage 1 hypertension, cardiology determined that you were at moderate risk of a cardiac event in 10 years (ASCVD risk score 18.9) and thus recommended that you start Atorvastatin 40 mg at bedtime. Due to your past history of a possible TIA, you were started on Aspirin 81 mg once a day.     Medicine was consulted, and you were started on ferrous sulfate (iron) 325 mg for your iron deficiency anemia.    For your myasthenia gravis exacerbation, you had 5 treatments of plasma exchange or PLEX (6/6, 6/7, 6/10, 6/11, 6/13).     Next steps:  Please continue your existing myasthenia gravis regimen of: Mestinon IR 60 mg 4 times a day and  mg at bedtime, Methotrexate 20 mg every week, Prednisone 10 mg once a day, and IVIG every 3 weeks.     Please continue Atorvastatin 40 mg at bedtime, Ferrous Sulfate 325 mg once a day, Vitamin D, and Aspirin 81 mg once a day.    Please follow outpatient with your neuromuscular specialist Dr. Shipley.    We placed referrals for you to follow up with a primary care physician for your anemia, and cardiology for your chest pain. You should receive calls to schedule these appointments in the next week.    Please do not hesitate to contact us if you have any questions. Thank you for allowing us to take care of you.    Your  Neurology Care Team

## 2024-06-10 NOTE — POST-PROCEDURE NOTE
Page Huston [id number]       Procedure type: Plasma Exchange    Replacement Fluids: 50% Albumin 50% Plasma      Procedure Completed Transfusion reaction and/or adverse event noted.    Appropriate actions taken.   Attending notified of completion status. See flow sheet(s) for additional details.  Post-Vitals listed below.    Post-procedure vitals:  Vitals @ 1610  BP: 116/67  Temp: 37.1 °C (98.8 °F)  Temp Source: Temporal [Temporal]  Heart Rate: 73  Resp: 20  SpO2: 98 %        Ramil Reyes, RN

## 2024-06-10 NOTE — PROGRESS NOTES
Page Huston is a 69 y.o. female on day 4 of admission presenting with Chest pain, unspecified type.      Subjective   NAEO. Pt reports that her SOB has improved and that it is present only with talking on the phone for 10 minutes. Says that ptosis L>R has improved to a 6-7/10 with 10 being her baseline. Sustained diplopia but improved. Hoarseness present but mild and improved. Denies substernal chest pain and dizziness.     Pt is awaiting 3rd PLEX treatment today. Per Dr. Shipley, pt should receive two additional PLEX treatments (6/11, 6/13). Pt is aware and agreeable.     Pt shared past history of potential TIA where she had slurring of speech, numbness, vision changes, twitching of face for two hours and a neighbor noticed her face looked abnormal. Pt is agreeable to starting Aspirin 81 mg. Pt is also agreeable to work with PT/OT to develop additional strength.    Objective     Last Recorded Vitals  /73   Pulse 71   Temp 36.2 °C (97.2 °F)   Resp 20   Wt 59.9 kg (132 lb)   SpO2 95%   Intake/Output last 3 Shifts:    Intake/Output Summary (Last 24 hours) at 6/10/2024 0818  Last data filed at 6/10/2024 0500  Gross per 24 hour   Intake 480 ml   Output --   Net 480 ml       Admission Weight  Weight: 59.9 kg (132 lb) (06/05/24 2119)    Daily Weight  06/05/24 : 59.9 kg (132 lb)    Image Results  Transthoracic Echo (TTE) Complete     [facility], [address] [place] [address]                 Tel [phone] and Fax [phone]    TRANSTHORACIC ECHOCARDIOGRAM REPORT       Patient Name:      PAGE HUSTON           Reading Physician:    [id number] Lan Santos MD  Study Date:        6/8/2024             Ordering Provider:    [id number] SHANTEL BHATIA  MRN/PID:           [id number]             Fellow:  Accession#:        [id number]         Nurse:                Alyce Bonilla RN  Date of Birth/Age: 1955 / 69 years  Sonographer:           Ruthann Raymond                                                                [facility]  Gender:            F                    Additional Staff:  Height:            160.02 cm            Admit Date:           6/5/2024  Weight:            59.88 kg             Admission Status:     Inpatient -                                                                Priority discharge  BSA / BMI:         1.62 m2 / 23.38      Encounter#:           [id number]                     kg/m2                                          Department Location:  [facility] Non                                                                Invasive  Blood Pressure: 160 /94 mmHg    Study Type:    TRANSTHORACIC ECHO (TTE) COMPLETE  Diagnosis/ICD: Chest pain, unspecified-R07.9  Indication:    Exertional dyspnea  CPT Code:      Echo Complete w Full Doppler-[id number]    Patient History:  Pertinent History: Aneurysm; Stroke; Chest pain; Myasthenia Gravis; Anemia; POWERS;                     Abnormal EKG.    Study Detail: The following Echo studies were performed: 2D, M-Mode, Doppler and                color flow. Agitated saline used as a contrast agent for                intraseptal flow evaluation.       PHYSICIAN INTERPRETATION:  Left Ventricle: The left ventricular systolic function is normal, with an estimated ejection fraction of 70%. There are no regional wall motion abnormalities. The left ventricular cavity size is normal. Spectral Doppler shows a normal pattern of left ventricular diastolic filling. There are normal left ventricular filling pressures. There is anterobasal septal hypertrophy with a sigmoid septum.  Left Atrium: The left atrium is upper limits of normal in size. A bubble study using agitated saline was very technically difficult and unable to be interpreted.  Right Ventricle: The right ventricle is normal in size. There is normal right ventricular global systolic function.  Right Atrium: The right atrium is normal in size.  Aortic  Valve: The aortic valve is trileaflet. There is mild aortic valve cusp calcification. There is mild aortic valve regurgitation. The peak instantaneous gradient of the aortic valve is 8.9 mmHg. The mean gradient of the aortic valve is 4.0 mmHg.  Mitral Valve: The mitral valve is mildly thickened. There is mild mitral valve regurgitation.  Tricuspid Valve: The tricuspid valve is structurally normal. There is mild tricuspid regurgitation. The Doppler estimated RVSP is within normal limits at 25.8 mmHg.  Pulmonic Valve: The pulmonic valve is structurally normal. There is physiologic pulmonic valve regurgitation.  Pericardium: There is a trivial pericardial effusion.  Aorta: The aortic root is normal. The aortic root is at the upper limits of normal size.  Systemic Veins: The inferior vena cava appears to be of normal size. There is IVC inspiratory collapse greater than 50%.  In comparison to the previous echocardiogram(s): There are no prior studies on this patient for comparison purposes.       CONCLUSIONS:   1. Left ventricular systolic function is normal with a 70% estimated ejection fraction.   2. There is anterobasal septal hypertrophy with a sigmoid septum.   3. RVSP within normal limits.   4. Mild aortic valve regurgitation.    QUANTITATIVE DATA SUMMARY:  2D MEASUREMENTS:                           Normal Ranges:  Ao Root d:     3.60 cm   (2.0-3.7cm)  LAs:           3.70 cm   (2.7-4.0cm)  IVSd:          0.90 cm   (0.6-1.1cm)  LVPWd:         0.70 cm   (0.6-1.1cm)  LVIDd:         4.20 cm   (3.9-5.9cm)  LVIDs:         2.60 cm  LV Mass Index: 62.5 g/m2  LV % FS        38.1 %    LA VOLUME:                              Normal Ranges:  LA Volume Index: 33.5 ml/m2    RA VOLUME BY A/L METHOD:                        Normal Ranges:  RA Area A4C: 11.9 cm2    AORTA MEASUREMENTS:                     Normal Ranges:  Asc Ao, d: 3.00 cm (2.1-3.4cm)    LV SYSTOLIC FUNCTION BY 2D PLANIMETRY (MOD):                      Normal  Ranges:  EF-A4C View: 76.2 % (>=55%)  EF-A2C View: 77.9 %  EF-Biplane:  76.9 %    LV DIASTOLIC FUNCTION:                                Normal Ranges:  MV Peak E:        0.68 m/s    (0.7-1.2 m/s)  MV Peak A:        0.70 m/s    (0.42-0.7 m/s)  E/A Ratio:        0.98        (1.0-2.2)  MV e'             0.08 m/s    (>8.0)  MV lateral e'     0.09 m/s  MV medial e'      0.07 m/s  MV A Dur:         137.50 msec  E/e' Ratio:       8.54        (<8.0)  MV DT:            216 msec    (150-240 msec)  PulmV Sys Lul:    43.20 cm/s  PulmV Rivas Lul:   40.40 cm/s  PulmV S/D Lul:    1.10  PulmV A Revs Lul: 24.30 cm/s  PulmV A Revs Dur: 148.00 msec    MITRAL VALVE:                  Normal Ranges:  MV DT: 216 msec (150-240msec)    MITRAL INSUFFICIENCY:                       Normal Ranges:  MR VTI:  175.00 cm  MR Vmax: 543.00 cm/s    AORTIC VALVE:                                    Normal Ranges:  AoV Vmax:                1.49 m/s (<=1.7m/s)  AoV Peak P.9 mmHg (<20mmHg)  AoV Mean P.0 mmHg (1.7-11.5mmHg)  LVOT Max Lul:            1.40 m/s (<=1.1m/s)  AoV VTI:                 29.00 cm (18-25cm)  LVOT VTI:                29.80 cm  LVOT Diameter:           2.00 cm  (1.8-2.4cm)  AoV Area, VTI:           3.23 cm2 (2.5-5.5cm2)  AoV Area,Vmax:           2.95 cm2 (2.5-4.5cm2)  AoV Dimensionless Index: 1.03    AORTIC INSUFFICIENCY:  AI Vmax:       4.42 m/s  AI Half-time:  593 msec  AI Decel Rate: 185.00 cm/s2       RIGHT VENTRICLE:  RV Basal 3.30 cm  RV Mid   2.50 cm  RV Major 6.1 cm  TAPSE:   17.9 mm  RV s'    0.12 m/s    TRICUSPID VALVE/RVSP:                              Normal Ranges:  Peak TR Velocity: 2.39 m/s  RV Syst Pressure: 25.8 mmHg (< 30mmHg)    PULMONIC VALVE:                       Normal Ranges:  PV Max Lul: 1.1 m/s  (0.6-0.9m/s)  PV Max P.0 mmHg    Pulmonary Veins:  PulmV A Revs Dur: 148.00 msec  PulmV A Revs Lul: 24.30 cm/s  PulmV Rivas Lul:   40.40 cm/s  PulmV S/D Lul:    1.10  PulmV Sys Lul:     43.20 cm/s       [id number] Lan Santos MD  Electronically signed on 6/8/2024 at 9:23:03 AM       ** Final **      Physical Exam    MG-specific exam:  Mild ptosis on sustained upgaze bilaterally L>R  Single breath count: 29  Neck flexion: 5-/5  Neck extension: 5-/5  +mild hoarseness  Most recent Negative Inspiratory Force (NIF): -60 (06/10/24 0700)      Mental State:  Orientation: A&Ox4  Language: Intact for comprehension, repetition, expression, naming     Cranial Nerves  - I/III: PERRL  - II: Visual fields intact to confrontation bilaterally tested, individually, and together.  Diplopia on upgaze and lateral gaze, not present at midline.  - III, IV, VI: EOM full to pursuit without nystagmus. Ptosis of left eyelid.   - V: V1-V3 sensation intact bilaterally  - VII: Face muscles symmetric with smile, eye closure, eyebrow raising, and cheek puffing  - VIII: Intact to interview  - IX, X: Palate elevated symmetrically, bilaterally, mild hoarseness of voice  - XI: 5/5 strength on shoulder shrug bilaterally  - XII: Tongue midline without atrophy or fasciculations, no thrush     MOTOR EXAM:  Muscle bulk and tone were normal in UE and LE  No fasciculations, tremor, or other abnormal movements present     STRENGTH:      R          L  Deltoid             5       5-  Biceps              5        5  Triceps             5        5                   5         5     Hip flexion       5        5  Quadriceps      5         5  Hamstrings      5         5  DorsiFlex          5-       5-  PlantarFlex       5         5     REFLEXES:        R          L  Biceps              3          3  Brachioradialis 3          3  Patellar             2         2  Achilles            2          2  Plantar             Down      Down  No clonus     SENSORY: Intact to light touch in bilateral UE and LE   COORDINATION: Intact on finger to nose bilaterally    This patient has a central line              Reason for the central line remaining today?  "Dialysis/Hemapheresis       Assessment/Plan   Page Huston is a 69 y.o. female with a PMHx of seronegative myasthenia gravis (follows with Dr. Shipley), iron deficiency anemia (pending outpatient endoscopy with GI to evaluate for bleed) who presents to [facility] for worsening dyspnea on exertion, chest pain and reported \"abnormal EKG\" c/f MG exacerbation vs cardiac etiology. Admitted to neurology for MG exacerbation. Per Dr. Shipley, pt should receive 5 total PLEX treatments (6/6, 6/7, 6/10, 6/11, 6/13).      Pt has improved fatigue, neck flex/ext strength, LE/UE strength, ptosis bilaterally, and hoarseness of voice with treatment of MG exacerbation/flare with PLEX. Pt reports dyspnea only with prolonged talking after 10 minutes and denies substernal chest pain. Normal TTE per cardiology. Will need medicine and cardiology follow up outpatient. Pt with continued SOB, consider seeing outpatient pulmonology. Iron deficiency anemia may be contributing to her fatigue and shortness of breath, continue ferrous sulfate 325 mg PO per medicine team. Pt also has history of TIA, agreeable to start Aspirin 81 mg. PT/OT consult to improve strength.     UPDATES 6/10:  - Per Dr. Shipley, planning 2 additional PLEX treatments (5 total) on 6/11 and 6/13, then discharge to home if stable  - MMA pending  - PT/OT Consult  [ ] Patient will require outpatient Cardiology and Medicine follow-up  - Start Aspirin 81 mg PO due to past history of possible TIA     Plan  #MG exacerbation  #Seronegative MG  - Outpatient neuromuscular specialist, Dr Shipley, aware of admission  - Hold IVIG to avoid risk of hypercoagulation in the setting of a potential cardiac etiology for SOB  - NIF/VC q6h  Most recent result: Negative Inspiratory Force (NIF): -60 (06/10/24 0700)   - PLEX x 5 days (6/6, 6/7, 6/10, 6/11, 6/13)  - C/w home mestinon IR 60q4 and ER 180mg at bedtime  - C/w home methotrexate 20mg qweek and prednisone 10mg  - LRP4 Ab sent per Dr Shipley " recs  - Will follow up with neuromuscular outpatient   - PT/OT Consult     #Exertional Dyspnea  #Chest pain  #c/f unstable angina  ::Trops and BNP WNL  ::LDL 94, A1C 5.7  ::TSH WNL  ::EKG with isolated nonpathologic Q wave in lead III (stable since 2019)  ::Cardiology signed off 6/8  ::ASCVD risk 18.9  - TTE complete 6/8: normal EF, mild AR  - CT Coronary 6/7: nonobstructive CAD, small hiatal hernia, kidney cysts              -will follow up with medicine for further eval of kidney cysts  - Atorva 40 started per cardiology recs  - Start ASA 81mg   [ ] Will request outpatient cardiology follow up  [ ] Consider outpatient pulmonology for continued SOB     #Iron Deficiency Anemia  #Chronic Anemia  ::baseline Hg 9-10, on admission Hg 9.8  ::Folic acid, B12 WNL  ::Iron 36, TIBC 445, %Sat 8%, ferritin 17, RDW 16.9  ::homocysteine 8.17  ::Medicine consulted, signed off on 6/6/24  - C/w home folic acid 1mg  - Started oral Ferrous Sulfate 325mg daily  [ ] MMA pending  [ ] Will request outpatient medicine follow up     #Chronic back pain  #Anxiety  #Insomnia  - Continue home gabapentin 300mg at bedtime  - Continue home trazadone 25mg PRN  - Continue home duloxetine DR 60mg daily  - Lidocaine patch and Diclofenac gel PRN  - Acetaminophen PRN     F: PRN  E: PRN  N: regular diet  DVT ppx: Lovenox  GI ppx: Protonix     Access:  - PIV  - R internal jugular CVC (6/6- )     ------------------------------------------------------------------------------------------------------------------  Medications that should not be used in Myasthenia Gravis     *Absolute Contraindication (are life-threatening):  Curare, D-penicillamine, Botulinum/Botox, Alpha-interferon     *Contraindications (should be avoided):  Antibiotics:   -Aminoglycosides: Gentamycin, Kanamycin, Amikacin, Neomycin, Streptomycin, Tobarmycin, Netilmycin, Paromomycin, Spectinomycin.  -Anything that ends w/ MYCIN (including Vancomycin).  -Macrolides: Azithromycin (Z-pack),  Erythromycin, Claithromycin (Biaxin), Telithromycin.  -Flouroquinolones: Ciprofloxacin (Cipro), Nofloxacin, Levofloxacin.  Antimalarials:  -Chloroquine, Hydroxycholoroquine  Anti-arrhythmics:  -Quinidine, Procainamide, Etafenone, Peruvoside  Magnesium:  -Oral tablets, IV Magnesium replacement.     *Use With Caution:  Antihypertensives:  -CCB: Verapamil, Nifedipine, Felodipine.  -BBC: Propranolol, Atenolol, Acebutolol, Practolol, Oxprenolol, Sotalol, Nadolol and Ophthalmic Timolol.  -Lithium.  -Any new med  -Iodinated Contrast Agents.  -Steroids (especially if high dose and patient is steroid-naiive).     Antibiotics relatively safe in MG:  Penicillins (including Zosyn), Cephalosporins, Carbapenems, Doxycyline, Linezolid      KRISTIN ARMENDARIZ, MS3  [facility]    I participated and contributed to the above medical student note including the HPI, physical exam, assessment, and plan. I agree with the above information.    Kristi Sánchez MD  Neurology PGY2

## 2024-06-10 NOTE — HOSPITAL COURSE
"69 y.o. female with a PMHx of seronegative myasthenia gravis and iron deficiency anemia who presented to [facility] for worsening dyspnea on exertion, chest pain and reported \"abnormal EKG\" c/f MG exacerbation vs cardiac etiology. Pt is currently managed by mestinon IR 60 q4 and  mg at bedtime, methotrexate 20 mg qweek, prednisone 10 mg, and IVIG q3wks for MG. Pt's IVIG was held inpatient due to risk of hypercoagulation due to potential cardiac etiology for SOB. Exam notable for neck flex/ext weakness, LE/UE weakness, worsening L>R ptosis, hoarseness of voice, diplopia on upgaze and lateral gaze (without prism). Consulted cardiology for SOB on exertion on 1st day of admission. TTE showed normal EF and mild AR and CT Coronary showed nonobstructive CAD, small hiatal hernia, kidney cysts. 10-year ASCVD risk score 18.9, moderate risk. Pt was started on Atorvastatin 40 mg at bedtime (has been refusing while inpatient). Due to history of questionable TIA (8/2023 numbness, slurring speech, vision changes 2 hours), pt was started on Aspirin 81 mg. Medicine consulted for iron deficiency anemia, and pt was started on ferrous sulfate 325 mg PO. Clinical course is consistent with MG exacerbation. LRP4 Ab pending. In total, pt received 5 days of PLEX (6/6, 6/7, 6/10, 6/11, 6/13). Pt discharged on existing MG regimen including resumption of IVIG q3 weeks. She demonstrated significant improvement in strength with PLEX. Was discharged home on 6/13.       "

## 2024-06-11 ENCOUNTER — APPOINTMENT (OUTPATIENT)
Dept: OTHER | Facility: HOSPITAL | Age: 69
End: 2024-06-11
Payer: COMMERCIAL

## 2024-06-11 VITALS
DIASTOLIC BLOOD PRESSURE: 69 MMHG | RESPIRATION RATE: 18 BRPM | OXYGEN SATURATION: 98 % | HEIGHT: 63 IN | TEMPERATURE: 97 F | HEART RATE: 62 BPM | SYSTOLIC BLOOD PRESSURE: 106 MMHG | WEIGHT: 132 LBS | BODY MASS INDEX: 23.39 KG/M2

## 2024-06-11 LAB
ALBUMIN SERPL BCP-MCNC: 3.7 G/DL (ref 3.4–5)
ANION GAP SERPL CALC-SCNC: 11 MMOL/L (ref 10–20)
APTT PPP: 28 SECONDS (ref 27–38)
BLOOD EXPIRATION DATE: NORMAL
BUN SERPL-MCNC: 15 MG/DL (ref 6–23)
CA-I BLD-SCNC: 1.22 MMOL/L (ref 1.1–1.33)
CA-I BLD-SCNC: 1.25 MMOL/L (ref 1.1–1.33)
CALCIUM SERPL-MCNC: 8.6 MG/DL (ref 8.6–10.6)
CHLORIDE SERPL-SCNC: 104 MMOL/L (ref 98–107)
CO2 SERPL-SCNC: 29 MMOL/L (ref 21–32)
CREAT SERPL-MCNC: 0.79 MG/DL (ref 0.5–1.05)
DISPENSE STATUS: NORMAL
EGFRCR SERPLBLD CKD-EPI 2021: 80 ML/MIN/1.73M*2
ERYTHROCYTE [DISTWIDTH] IN BLOOD BY AUTOMATED COUNT: 18.2 % (ref 11.5–14.5)
ERYTHROCYTE [DISTWIDTH] IN BLOOD BY AUTOMATED COUNT: 18.4 % (ref 11.5–14.5)
FIBRINOGEN PPP-MCNC: 233 MG/DL (ref 200–400)
GLUCOSE SERPL-MCNC: 88 MG/DL (ref 74–99)
HCT VFR BLD AUTO: 30.4 % (ref 36–46)
HCT VFR BLD AUTO: 31.6 % (ref 36–46)
HGB BLD-MCNC: 10 G/DL (ref 12–16)
HGB BLD-MCNC: 9.4 G/DL (ref 12–16)
INR PPP: 1 (ref 0.9–1.1)
MCH RBC QN AUTO: 27.1 PG (ref 26–34)
MCH RBC QN AUTO: 27.4 PG (ref 26–34)
MCHC RBC AUTO-ENTMCNC: 30.9 G/DL (ref 32–36)
MCHC RBC AUTO-ENTMCNC: 31.6 G/DL (ref 32–36)
MCV RBC AUTO: 86 FL (ref 80–100)
MCV RBC AUTO: 89 FL (ref 80–100)
NRBC BLD-RTO: 0 /100 WBCS (ref 0–0)
NRBC BLD-RTO: 0 /100 WBCS (ref 0–0)
PHOSPHATE SERPL-MCNC: 4.1 MG/DL (ref 2.5–4.9)
PLATELET # BLD AUTO: 229 X10*3/UL (ref 150–450)
PLATELET # BLD AUTO: 249 X10*3/UL (ref 150–450)
POTASSIUM SERPL-SCNC: 3.8 MMOL/L (ref 3.5–5.3)
PRODUCT BLOOD TYPE: 5100
PRODUCT CODE: NORMAL
PROTHROMBIN TIME: 11.8 SECONDS (ref 9.8–12.8)
RBC # BLD AUTO: 3.43 X10*6/UL (ref 4–5.2)
RBC # BLD AUTO: 3.69 X10*6/UL (ref 4–5.2)
SODIUM SERPL-SCNC: 140 MMOL/L (ref 136–145)
UNIT ABO: NORMAL
UNIT NUMBER: NORMAL
UNIT RH: NORMAL
UNIT VOLUME: 221
UNIT VOLUME: 316
UNIT VOLUME: 318
UNIT VOLUME: 326
UNIT VOLUME: 327
WBC # BLD AUTO: 9.4 X10*3/UL (ref 4.4–11.3)
WBC # BLD AUTO: 9.8 X10*3/UL (ref 4.4–11.3)

## 2024-06-11 PROCEDURE — 2500000001 HC RX 250 WO HCPCS SELF ADMINISTERED DRUGS (ALT 637 FOR MEDICARE OP): Performed by: STUDENT IN AN ORGANIZED HEALTH CARE EDUCATION/TRAINING PROGRAM

## 2024-06-11 PROCEDURE — 99231 SBSQ HOSP IP/OBS SF/LOW 25: CPT

## 2024-06-11 PROCEDURE — 2500000004 HC RX 250 GENERAL PHARMACY W/ HCPCS (ALT 636 FOR OP/ED): Performed by: STUDENT IN AN ORGANIZED HEALTH CARE EDUCATION/TRAINING PROGRAM

## 2024-06-11 PROCEDURE — P9017 PLASMA 1 DONOR FRZ W/IN 8 HR: HCPCS

## 2024-06-11 PROCEDURE — 97161 PT EVAL LOW COMPLEX 20 MIN: CPT | Mod: GP

## 2024-06-11 PROCEDURE — 2500000001 HC RX 250 WO HCPCS SELF ADMINISTERED DRUGS (ALT 637 FOR MEDICARE OP)

## 2024-06-11 PROCEDURE — 85027 COMPLETE CBC AUTOMATED: CPT | Mod: 91

## 2024-06-11 PROCEDURE — P9045 ALBUMIN (HUMAN), 5%, 250 ML: HCPCS | Mod: JZ

## 2024-06-11 PROCEDURE — 82330 ASSAY OF CALCIUM: CPT

## 2024-06-11 PROCEDURE — 2500000004 HC RX 250 GENERAL PHARMACY W/ HCPCS (ALT 636 FOR OP/ED)

## 2024-06-11 PROCEDURE — 85610 PROTHROMBIN TIME: CPT

## 2024-06-11 PROCEDURE — 84100 ASSAY OF PHOSPHORUS: CPT

## 2024-06-11 PROCEDURE — 2500000002 HC RX 250 W HCPCS SELF ADMINISTERED DRUGS (ALT 637 FOR MEDICARE OP, ALT 636 FOR OP/ED)

## 2024-06-11 PROCEDURE — 1200000002 HC GENERAL ROOM WITH TELEMETRY DAILY

## 2024-06-11 PROCEDURE — 82330 ASSAY OF CALCIUM: CPT | Mod: 91

## 2024-06-11 PROCEDURE — 85384 FIBRINOGEN ACTIVITY: CPT

## 2024-06-11 PROCEDURE — 85027 COMPLETE CBC AUTOMATED: CPT

## 2024-06-11 PROCEDURE — 2500000004 HC RX 250 GENERAL PHARMACY W/ HCPCS (ALT 636 FOR OP/ED): Mod: JZ

## 2024-06-11 PROCEDURE — 36514 APHERESIS PLASMA: CPT

## 2024-06-11 RX ORDER — ACETAMINOPHEN 325 MG/1
650 TABLET ORAL ONCE
Status: COMPLETED | OUTPATIENT
Start: 2024-06-11 | End: 2024-06-11

## 2024-06-11 RX ORDER — DIPHENHYDRAMINE HYDROCHLORIDE 50 MG/ML
25 INJECTION INTRAMUSCULAR; INTRAVENOUS EVERY 5 MIN PRN
Status: DISCONTINUED | OUTPATIENT
Start: 2024-06-11 | End: 2024-06-11 | Stop reason: HOSPADM

## 2024-06-11 RX ORDER — ALBUMIN HUMAN 50 G/1000ML
12.5 SOLUTION INTRAVENOUS
Status: COMPLETED | OUTPATIENT
Start: 2024-06-11 | End: 2024-06-11

## 2024-06-11 RX ORDER — CALCIUM CARBONATE 200(500)MG
1500 TABLET,CHEWABLE ORAL EVERY 5 MIN PRN
Status: DISCONTINUED | OUTPATIENT
Start: 2024-06-11 | End: 2024-06-11 | Stop reason: HOSPADM

## 2024-06-11 RX ORDER — HEPARIN SODIUM 1000 [USP'U]/ML
1100 INJECTION, SOLUTION INTRAVENOUS; SUBCUTANEOUS ONCE
Status: COMPLETED | OUTPATIENT
Start: 2024-06-11 | End: 2024-06-11

## 2024-06-11 RX ORDER — DIPHENHYDRAMINE HCL 25 MG
50 CAPSULE ORAL ONCE
Status: COMPLETED | OUTPATIENT
Start: 2024-06-11 | End: 2024-06-11

## 2024-06-11 RX ORDER — CALCIUM GLUCONATE 20 MG/ML
3720 INJECTION, SOLUTION INTRAVENOUS ONCE
Status: COMPLETED | OUTPATIENT
Start: 2024-06-11 | End: 2024-06-11

## 2024-06-11 RX ADMIN — PYRIDOSTIGMINE BROMIDE 60 MG: 60 TABLET ORAL at 10:32

## 2024-06-11 RX ADMIN — PYRIDOSTIGMINE BROMIDE 60 MG: 60 TABLET ORAL at 13:50

## 2024-06-11 RX ADMIN — TRAZODONE HYDROCHLORIDE 50 MG: 50 TABLET ORAL at 04:00

## 2024-06-11 RX ADMIN — FOLIC ACID 1 MG: 1 TABLET ORAL at 08:29

## 2024-06-11 RX ADMIN — CALCIUM GLUCONATE 3720 MG: 20 INJECTION, SOLUTION INTRAVENOUS at 08:58

## 2024-06-11 RX ADMIN — Medication 5000 UNITS: at 08:26

## 2024-06-11 RX ADMIN — PYRIDOSTIGMINE BROMIDE 60 MG: 60 TABLET ORAL at 18:03

## 2024-06-11 RX ADMIN — ALBUMIN HUMAN 12.5 G: 0.05 INJECTION, SOLUTION INTRAVENOUS at 09:07

## 2024-06-11 RX ADMIN — PYRIDOSTIGMINE BROMIDE 60 MG: 60 TABLET ORAL at 21:28

## 2024-06-11 RX ADMIN — GABAPENTIN 300 MG: 300 CAPSULE ORAL at 21:27

## 2024-06-11 RX ADMIN — PYRIDOSTIGMINE BROMIDE 180 MG: 180 TABLET ORAL at 21:28

## 2024-06-11 RX ADMIN — PANTOPRAZOLE SODIUM 40 MG: 40 TABLET, DELAYED RELEASE ORAL at 08:29

## 2024-06-11 RX ADMIN — ALBUMIN HUMAN 12.5 G: 0.05 INJECTION, SOLUTION INTRAVENOUS at 09:47

## 2024-06-11 RX ADMIN — ALBUMIN HUMAN 12.5 G: 0.05 INJECTION, SOLUTION INTRAVENOUS at 09:28

## 2024-06-11 RX ADMIN — PREDNISONE 10 MG: 10 TABLET ORAL at 08:29

## 2024-06-11 RX ADMIN — HEPARIN SODIUM 1100 UNITS: 1000 INJECTION INTRAVENOUS; SUBCUTANEOUS at 11:05

## 2024-06-11 RX ADMIN — ALBUMIN HUMAN 12.5 G: 0.05 INJECTION, SOLUTION INTRAVENOUS at 09:16

## 2024-06-11 RX ADMIN — ACETAMINOPHEN 650 MG: 325 TABLET ORAL at 02:25

## 2024-06-11 RX ADMIN — FERROUS SULFATE TAB 325 MG (65 MG ELEMENTAL FE) 1 TABLET: 325 (65 FE) TAB at 08:29

## 2024-06-11 RX ADMIN — ENOXAPARIN SODIUM 40 MG: 40 INJECTION, SOLUTION SUBCUTANEOUS at 08:30

## 2024-06-11 RX ADMIN — ALBUMIN HUMAN 12.5 G: 0.05 INJECTION, SOLUTION INTRAVENOUS at 09:22

## 2024-06-11 RX ADMIN — PYRIDOSTIGMINE BROMIDE 60 MG: 60 TABLET ORAL at 06:12

## 2024-06-11 RX ADMIN — ALBUMIN HUMAN 12.5 G: 0.05 INJECTION, SOLUTION INTRAVENOUS at 09:41

## 2024-06-11 RX ADMIN — ACETAMINOPHEN 650 MG: 325 TABLET ORAL at 08:38

## 2024-06-11 RX ADMIN — ASPIRIN 81 MG: 81 TABLET, COATED ORAL at 08:29

## 2024-06-11 RX ADMIN — DIPHENHYDRAMINE HYDROCHLORIDE 50 MG: 25 CAPSULE ORAL at 08:39

## 2024-06-11 RX ADMIN — DULOXETINE HYDROCHLORIDE 60 MG: 60 CAPSULE, DELAYED RELEASE ORAL at 08:28

## 2024-06-11 RX ADMIN — TRAZODONE HYDROCHLORIDE 50 MG: 50 TABLET ORAL at 21:28

## 2024-06-11 RX ADMIN — ALBUMIN HUMAN 12.5 G: 0.05 INJECTION, SOLUTION INTRAVENOUS at 09:35

## 2024-06-11 ASSESSMENT — COGNITIVE AND FUNCTIONAL STATUS - GENERAL
DRESSING REGULAR UPPER BODY CLOTHING: A LITTLE
CLIMB 3 TO 5 STEPS WITH RAILING: A LITTLE
DRESSING REGULAR LOWER BODY CLOTHING: A LITTLE
DAILY ACTIVITIY SCORE: 21
DAILY ACTIVITIY SCORE: 24
TURNING FROM BACK TO SIDE WHILE IN FLAT BAD: A LITTLE
HELP NEEDED FOR BATHING: A LITTLE
WALKING IN HOSPITAL ROOM: A LITTLE
MOVING TO AND FROM BED TO CHAIR: A LITTLE
MOBILITY SCORE: 19
MOBILITY SCORE: 24
STANDING UP FROM CHAIR USING ARMS: A LITTLE

## 2024-06-11 ASSESSMENT — PAIN SCALES - GENERAL
PAINLEVEL_OUTOF10: 3
PAINLEVEL_OUTOF10: 0 - NO PAIN
PAINLEVEL_OUTOF10: 5 - MODERATE PAIN

## 2024-06-11 ASSESSMENT — PAIN - FUNCTIONAL ASSESSMENT
PAIN_FUNCTIONAL_ASSESSMENT: 0-10

## 2024-06-11 ASSESSMENT — ACTIVITIES OF DAILY LIVING (ADL): ADL_ASSISTANCE: INDEPENDENT

## 2024-06-11 NOTE — POST-PROCEDURE NOTE
This is a 69 y.o. female with Myasthenia Gravis who underwent therapeutic plasma exchange (TPE) with  50% albumin and 50% plasma as replacement fluid.     I saw and evaluated the patient during the TPE.  The patient was resting in bed.  The vascular access functioned well.     Pre-procedure labs:  WBC   Date/Time Value Ref Range Status   06/10/2024 04:20 PM 8.7 4.4 - 11.3 x10*3/uL Final     Hemoglobin   Date/Time Value Ref Range Status   06/10/2024 04:20 PM 9.9 (L) 12.0 - 16.0 g/dL Final     Hematocrit   Date/Time Value Ref Range Status   06/10/2024 04:20 PM 31.6 (L) 36.0 - 46.0 % Final     Platelets   Date/Time Value Ref Range Status   06/10/2024 04:20  150 - 450 x10*3/uL Final        POCT Calcium, Ionized   Date/Time Value Ref Range Status   06/10/2024 04:20 PM 1.08 (L) 1.1 - 1.33 mmol/L Final     Comment:     The performance characteristics of ionized calcium tested  in heparinized plasma or serum have been validated by the  individual  laboratory site where testing is performed.   Testing on heparinized plasma or serum is not approved by   the FDA; however, such approval is not necessary.        Protime   Date/Time Value Ref Range Status   06/10/2024 05:12 AM 10.3 9.8 - 12.8 seconds Final     INR   Date/Time Value Ref Range Status   06/10/2024 05:12 AM 0.9 0.9 - 1.1 Final     aPTT   Date/Time Value Ref Range Status   06/10/2024 05:12 AM 26 (L) 27 - 38 seconds Final     Fibrinogen   Date/Time Value Ref Range Status   06/10/2024 05:12  200 - 400 mg/dL Final        Pre-procedure vital signs at 13:25:  T: 36.7 C, HR: 70, RR: 20, /80  Pulse oximetry: 96% on room air    Post-procedure vital signs at 16:10:  T: 37.1 C, HR: 73, RR: 20, BP: 116/67     Pulse oximetry: 98% on room air    Outcome: TPE completed.     Adverse Reaction: Yes, Following the transfusion of the 3rd unit of plasma, the patient complained of itching to back and a hive on her right buttock. The procedure was paused. She was given  25mg IV diphenhydramine after which her symptoms resolved. The procedure was resumed and completed without further complications.     Assessment and Plan:  69 y.o. female with Myasthenia Gravis who underwent TPE #3.  Draw post-procedure labs.  Vascular access to be managed by clinical team.  Next TPE #4 is schedule for 6/11/2024.

## 2024-06-11 NOTE — POST-PROCEDURE NOTE
This is a 69 y.o. female with Myasthenia Gravis who underwent therapeutic plasma exchange (TPE) with 75% albumin and 25% plasma as replacement fluid.     I saw and evaluated the patient during the TPE.  The patient was resting in bed.  The vascular access functioned well.     Pre-procedure labs:  WBC   Date/Time Value Ref Range Status   06/11/2024 04:44 AM 9.8 4.4 - 11.3 x10*3/uL Final     Hemoglobin   Date/Time Value Ref Range Status   06/11/2024 04:44 AM 10.0 (L) 12.0 - 16.0 g/dL Final     Hematocrit   Date/Time Value Ref Range Status   06/11/2024 04:44 AM 31.6 (L) 36.0 - 46.0 % Final     Platelets   Date/Time Value Ref Range Status   06/11/2024 04:44  150 - 450 x10*3/uL Final        POCT Calcium, Ionized   Date/Time Value Ref Range Status   06/11/2024 04:44 AM 1.22 1.1 - 1.33 mmol/L Final     Comment:     The performance characteristics of ionized calcium tested  in heparinized plasma or serum have been validated by the  individual  laboratory site where testing is performed.   Testing on heparinized plasma or serum is not approved by   the FDA; however, such approval is not necessary.        Protime   Date/Time Value Ref Range Status   06/11/2024 04:44 AM 11.8 9.8 - 12.8 seconds Final     INR   Date/Time Value Ref Range Status   06/11/2024 04:44 AM 1.0 0.9 - 1.1 Final     aPTT   Date/Time Value Ref Range Status   06/11/2024 04:44 AM 28 27 - 38 seconds Final     Fibrinogen   Date/Time Value Ref Range Status   06/11/2024 04:44  200 - 400 mg/dL Final        Pre-procedure vital signs at 08:50:  T: 36.7 C, HR: 69, RR: 20, /62  Pulse oximetry: 95% on room air    Post-procedure vital signs at 11:10 :  T: 36.8 C, HR: 64 , RR:20 , BP: 108/ 62   Pulse oximetry: 95% on room air    Outcome: TPE completed without complications.   Adverse reaction. No    Assessment and Plan:  69 y.o. female with Myasthenia Gravis who underwent TPE #4.  Draw post-procedure labs  Vascular access to be managed by clinical  team.  Next TPE #5 is schedule for 06/13/2024.

## 2024-06-11 NOTE — CARE PLAN
Problem: Pain  Goal: My pain/discomfort is manageable  Outcome: Progressing     Problem: Safety  Goal: Patient will be injury free during hospitalization  Outcome: Progressing  Goal: I will remain free of falls  Outcome: Progressing     Problem: Daily Care  Goal: Daily care needs are met  Outcome: Progressing     Problem: Psychosocial Needs  Goal: Demonstrates ability to cope with hospitalization/illness  Outcome: Progressing   The patient's goals for the shift include  maintain comfort    The clinical goals for the shift include pt will remain injury free

## 2024-06-11 NOTE — PROGRESS NOTES
"Page Huston is a 69 y.o. female on day 5 of admission presenting with Chest pain, unspecified type.      Subjective   Pt reports that she felt itching and hives \"like rug-burn\" during PLEX yesterday (6/10), received and responded to benadryl. Experienced nausea/vomiting and occipital headache of 10/10 three hours later. At this time, also had lower back pain that moved up to the right shoulder. Received tylenol. Felt like she \"had the flu\" and her \"fever broke\" in the morning at 4AM as she woke up \"drenched in sweat and freezing.\" Headache is currently 6/10. No back pain, no nausea/vomiting this morning.    In terms of her MG exacerbation, the pt has not experienced SOB with movement and with talking. Ptosis L>R at baseline. No chest pain. Hoarseness of voice improved. Noted that diplopia was worse with distance vision but this is consistent with her baseline. Pt is awaiting 4th PLEX treatment today.      Objective     Last Recorded Vitals  Blood pressure 103/65, pulse 86, temperature 36.3 °C (97.3 °F), resp. rate 18, height 1.6 m (5' 3\"), weight 59.9 kg (132 lb), SpO2 96%.    Physical Exam     MG-specific exam:  Mild ptosis on sustained upgaze bilaterally L>R  Single breath count: 30  Neck flexion: 5-/5  Neck extension: 5/5  +mild hoarseness but improved  Most recent Negative Inspiratory Force (NIF): -60 (06/10/24 0700)      Mental State:  Orientation: A&Ox4  Language: Intact for comprehension, repetition, expression, naming     Cranial Nerves  - I/III: PERRL  - II: Visual fields intact to confrontation bilaterally tested, individually, and together.  Diplopia on upgaze and lateral gaze, present at midline only with long distance vision.  - III, IV, VI: EOM full to pursuit without nystagmus. Ptosis L>R.   - V: V1-V3 sensation intact bilaterally  - VII: Face muscles symmetric with smile, eye closure, eyebrow raising, and cheek puffing  - VIII: Intact to interview  - IX, X: Palate elevated symmetrically, bilaterally, " "mild hoarseness of voice  - XI: 5/5 strength on shoulder shrug bilaterally  - XII: Tongue midline without atrophy or fasciculations, no thrush     MOTOR EXAM:  Muscle bulk and tone were normal in UE and LE  No fasciculations, tremor, or other abnormal movements present     STRENGTH:      R          L  Deltoid             5       5  Biceps              5        5  Triceps             5        5                   5         5     Hip flexion       5-        5-  Quadriceps      5         5  Hamstrings      5         5-  DorsiFlex          5-       5-  PlantarFlex       5         5     REFLEXES:        R          L  Biceps              3          3  Brachioradialis 3          3  Patellar             2         2  Achilles            2          2  Plantar             Down      Down  No clonus     SENSORY: Intact to light touch in bilateral UE and LE   COORDINATION: Intact on finger to nose bilaterally     This patient has a central line              Reason for the central line remaining today? Dialysis/Hemapheresis        Assessment/Plan   Page Huston is a 69 y.o. female with a PMHx of seronegative myasthenia gravis (follows with Dr. Shipley), iron deficiency anemia (pending outpatient endoscopy with GI to evaluate for bleed) who presents to [facility] for worsening dyspnea on exertion, chest pain and reported \"abnormal EKG\" c/f MG exacerbation vs cardiac etiology. Admitted to neurology for MG exacerbation. Per Dr. Shipley, pt should receive 5 total PLEX treatments (6/6, 6/7, 6/10, 6/11, 6/13).      Pt continues to have improved fatigue, neck flex/ext strength, LE/UE strength, ptosis bilaterally, diplopia, and hoarseness of voice with treatment of MG exacerbation/flare with PLEX. Ptosis and diplopia at baseline per pt. Pt no longer reports dyspnea on exertion nor with prolonged speech and denies substernal chest pain. Pt reported headache, n/v, back pain, and \"flu-like\" symptoms a few hours after PLEX treatment yesterday " (6/10). This is most likely due to side effects from PLEX. Pt reminded to stay hydrated and that she is able to have Tylenol PRN. Will follow.    Normal TTE per cardiology. Will need medicine and cardiology follow up outpatient. If pt has continued SOB, consider seeing outpatient pulmonology. Iron deficiency anemia may have contributed to her fatigue and shortness of breath, continue ferrous sulfate 325 mg PO per medicine team. Pt also has history of TIA, continue Aspirin 81 mg. PT/OT consult to improve strength.     UPDATES 6/11:  - Awaiting last PLEX treatment (5 treatments total) on 6/13  - PT/OT Consult     Plan  #MG exacerbation  #Seronegative MG  - Outpatient neuromuscular specialist, Dr Shipley, aware of admission  - Hold IVIG to avoid risk of hypercoagulation in the setting of a potential cardiac etiology for SOB  - NIF/VC q6h  Most recent result: Negative Inspiratory Force (NIF): -60 (06/10/24 0700)   - PLEX x 5 days (6/6, 6/7, 6/10, 6/11, 6/13)  - C/w home mestinon IR 60q4 and ER 180mg at bedtime  - C/w home methotrexate 20mg qweek and prednisone 10mg  - LRP4 Ab sent per Dr Shipley recs  - Will follow up with neuromuscular outpatient   - PT/OT Consult     #Exertional Dyspnea  #Chest pain  #c/f unstable angina  ::Trops and BNP WNL  ::LDL 94, A1C 5.7  ::TSH WNL  ::EKG with isolated nonpathologic Q wave in lead III (stable since 2019)  ::Cardiology signed off 6/8  ::ASCVD risk 18.9  - TTE complete 6/8: normal EF, mild AR  - CT Coronary 6/7: nonobstructive CAD, small hiatal hernia, kidney cysts              -will follow up with medicine for further eval of kidney cysts  - Atorva 40 started per cardiology recs  - Continue ASA 81mg given past TIA  [x] Will request outpatient cardiology follow up  [ ] Consider outpatient pulmonology for continued SOB     #Iron Deficiency Anemia  #Chronic Anemia  ::baseline Hg 9-10, on admission Hg 9.8  ::Folic acid, B12 WNL  ::Iron 36, TIBC 445, %Sat 8%, ferritin 17, RDW  16.9  ::homocysteine 8.17  ::MMA <0.10  ::Medicine consulted, signed off on 6/6/24  - C/w home folic acid 1mg  - Started oral Ferrous Sulfate 325mg daily  [X ] PCP follow up     #Chronic back pain  #Anxiety  #Insomnia  - Continue home gabapentin 300mg at bedtime  - Continue home trazadone 25mg PRN  - Continue home duloxetine DR 60mg daily  - Lidocaine patch and Diclofenac gel PRN  - Acetaminophen PRN     F: PRN  E: PRN  N: regular diet  DVT ppx: Lovenox  GI ppx: Protonix     Access:  - PIV  - R internal jugular CVC (6/6- )     ------------------------------------------------------------------------------------------------------------------  Medications that should not be used in Myasthenia Gravis     *Absolute Contraindication (are life-threatening):  Curare, D-penicillamine, Botulinum/Botox, Alpha-interferon     *Contraindications (should be avoided):  Antibiotics:   -Aminoglycosides: Gentamycin, Kanamycin, Amikacin, Neomycin, Streptomycin, Tobarmycin, Netilmycin, Paromomycin, Spectinomycin.  -Anything that ends w/ MYCIN (including Vancomycin).  -Macrolides: Azithromycin (Z-pack), Erythromycin, Claithromycin (Biaxin), Telithromycin.  -Flouroquinolones: Ciprofloxacin (Cipro), Nofloxacin, Levofloxacin.  Antimalarials:  -Chloroquine, Hydroxycholoroquine  Anti-arrhythmics:  -Quinidine, Procainamide, Etafenone, Peruvoside  Magnesium:  -Oral tablets, IV Magnesium replacement.     *Use With Caution:  Antihypertensives:  -CCB: Verapamil, Nifedipine, Felodipine.  -BBC: Propranolol, Atenolol, Acebutolol, Practolol, Oxprenolol, Sotalol, Nadolol and Ophthalmic Timolol.  -Lithium.  -Any new med  -Iodinated Contrast Agents.  -Steroids (especially if high dose and patient is steroid-naiive).     Antibiotics relatively safe in MG:  Penicillins (including Zosyn), Cephalosporins, Carbapenems, Doxycyline, Linezolid     KRISTIN ARMENDARIZ, MS3  [facility]    ------------------------------------------------------------------------  I have  reviewed and edited the information above and I agree with the assessment and plan as outlined by the medical student.     Briseida Blue MD  PGY-1 Neurology

## 2024-06-11 NOTE — PROGRESS NOTES
"Physical Therapy    Physical Therapy Evaluation    Patient Name: Page Huston  MRN: [id number]  Today's Date: 6/11/2024   Time Calculation  Start Time: 1511  Stop Time: 1522  Time Calculation (min): 11 min    Assessment/Plan   PT Assessment  Medical Staff Made Aware: Yes  End of Session Communication: Bedside nurse  Assessment Comment: Patient is a 68yo F presenting with MG exacerbation. Patient is indep at baseline. Patient SBA for mobility, reports feeling near baseline. No further acute PT needs but may benefit from outpatient PT to maximize gains and balance.  End of Session Patient Position: Bed, 3 rail up, Alarm off, not on at start of session  IP OR SWING BED PT PLAN  Inpatient or Swing Bed: Inpatient  PT Plan  PT Plan: PT Eval only  PT Eval Only Reason: At baseline function  PT Frequency: PT eval only  PT Discharge Recommendations: No further acute PT  PT - OK to Discharge: Yes      Subjective   General Visit Information:  General  Reason for Referral: MG exacerbation  Past Medical History Relevant to Rehab: seronegative myasthenia gravis (follows with Dr. Shipley), iron deficiency anemia (pending outpatient endoscopy with GI to evaluate for bleed) who presents to [facility] for worsening dyspnea on exertion, chest pain and reported \"abnormal EKG\" c/f MG exacerbation vs cardiac etiology.  Prior to Session Communication: Bedside nurse  Patient Position Received: Bed, 3 rail up  General Comment: pt agreeable to therapy and RN cleared  Home Living:  Home Living  Type of Home: Condo  Lives With: Alone  Home Adaptive Equipment:  (shower chair)  Home Layout: One level  Home Access: Stairs to enter with rails  Entrance Stairs-Number of Steps: 1  Bathroom Shower/Tub: Walk-in shower  Bathroom Equipment: Shower chair with back  Prior Level of Function:  Prior Function Per Pt/Caregiver Report  Level of [place]: Independent with ADLs and functional transfers, Independent with homemaking with ambulation  Receives Help From:  " (indep but bf and dtr nearby if needed)  ADL Assistance: Independent  Homemaking Assistance: Independent  Ambulatory Assistance: Independent  Prior Function Comments: reports 2 falls in last 6 months, + drive, works part time  Precautions:  Precautions  Medical Precautions: Fall precautions      Objective   Pain:  Pain Assessment  Pain Assessment: 0-10  Pain Score: 0 - No pain (denies pain)  Cognition:  Cognition  Overall Cognitive Status: Within Functional Limits  Orientation Level: Oriented X4    General Assessments:     Activity Tolerance  Endurance: Endurance does not limit participation in activity    Sensation  Light Touch:  (reports her N/T has cleared up since PLEX)    Functional Assessments:  Bed Mobility  Bed Mobility: Yes  Bed Mobility 1  Bed Mobility 1: Supine to sitting, Sitting to supine  Level of Assistance 1: Distant supervision  Bed Mobility Comments 1: HOB flat    Transfers  Transfer: Yes  Transfer 1  Transfer From 1: Sit to, Stand to  Transfer to 1: Stand, Sit  Technique 1: Sit to stand, Stand to sit  Transfer Level of Assistance 1: Close supervision  Trials/Comments 1: stood from EOB    Ambulation/Gait Training  Ambulation/Gait Training Performed: Yes  Ambulation/Gait Training 1  Surface 1: Level tile  Device 1: No device  Assistance 1: Close supervision  Quality of Gait 1:  (decreased stephanie)  Comments/Distance (ft) 1: > 250'    Stairs  Stairs: No  Extremity/Trunk Assessments:  RLE   RLE : Within Functional Limits  LLE   LLE : Within Functional Limits  Outcome Measures:  [facility] Basic Mobility  Turning from your back to your side while in a flat bed without using bedrails: None  Moving from lying on your back to sitting on the side of a flat bed without using bedrails: A little  Moving to and from bed to chair (including a wheelchair): A little  Standing up from a chair using your arms (e.g. wheelchair or bedside chair): A little  To walk in hospital room: A little  Climbing 3-5 steps with  artemiodiaz: A little  Basic Mobility - Total Score: 19    Education Documentation  Precautions, taught by Flori Degroot PT at 6/11/2024  3:32 PM.  Learner: Patient  Readiness: Acceptance  Method: Explanation  Response: Verbalizes Understanding  Comment: edu on role of PT, dc plan    Mobility Training, taught by Flori Degroot PT at 6/11/2024  3:32 PM.  Learner: Patient  Readiness: Acceptance  Method: Explanation  Response: Verbalizes Understanding  Comment: edu on role of PT, dc plan    Education Comments  No comments found.

## 2024-06-11 NOTE — POST-PROCEDURE NOTE
Page Huston [id number]       Procedure type: Plasma Exchange    Replacement Fluids:  75%  Albumin /  25% Plasma     Procedure Completed Without complications.    Attending notified of completion status. See flow sheet(s) for additional details.  Post-Vitals listed below.    Post-procedure vitals:  Vitals @ 1110  BP: 108/62  Temp: 36.8 °C (98.2 °F)  Temp Source: Temporal [Temporal]  Heart Rate: 64  Resp: 20  SpO2: 95 %        Ramil Reyes, RN

## 2024-06-12 LAB
ALBUMIN SERPL BCP-MCNC: 3.8 G/DL (ref 3.4–5)
ANION GAP SERPL CALC-SCNC: 12 MMOL/L (ref 10–20)
BASOPHILS # BLD AUTO: 0.1 X10*3/UL (ref 0–0.1)
BASOPHILS NFR BLD AUTO: 1.3 %
BUN SERPL-MCNC: 17 MG/DL (ref 6–23)
CA-I BLD-SCNC: 1.24 MMOL/L (ref 1.1–1.33)
CALCIUM SERPL-MCNC: 8.8 MG/DL (ref 8.6–10.6)
CHLORIDE SERPL-SCNC: 106 MMOL/L (ref 98–107)
CO2 SERPL-SCNC: 27 MMOL/L (ref 21–32)
CREAT SERPL-MCNC: 0.71 MG/DL (ref 0.5–1.05)
EGFRCR SERPLBLD CKD-EPI 2021: >90 ML/MIN/1.73M*2
EOSINOPHIL # BLD AUTO: 0.23 X10*3/UL (ref 0–0.4)
EOSINOPHIL NFR BLD AUTO: 3 %
ERYTHROCYTE [DISTWIDTH] IN BLOOD BY AUTOMATED COUNT: 18.4 % (ref 11.5–14.5)
GLUCOSE SERPL-MCNC: 85 MG/DL (ref 74–99)
HCT VFR BLD AUTO: 31.9 % (ref 36–46)
HGB BLD-MCNC: 9.9 G/DL (ref 12–16)
IMM GRANULOCYTES # BLD AUTO: 0.04 X10*3/UL (ref 0–0.5)
IMM GRANULOCYTES NFR BLD AUTO: 0.5 % (ref 0–0.9)
LYMPHOCYTES # BLD AUTO: 2.87 X10*3/UL (ref 0.8–3)
LYMPHOCYTES NFR BLD AUTO: 37.3 %
MAGNESIUM SERPL-MCNC: 2.26 MG/DL (ref 1.6–2.4)
MCH RBC QN AUTO: 27.8 PG (ref 26–34)
MCHC RBC AUTO-ENTMCNC: 31 G/DL (ref 32–36)
MCV RBC AUTO: 90 FL (ref 80–100)
MONOCYTES # BLD AUTO: 0.97 X10*3/UL (ref 0.05–0.8)
MONOCYTES NFR BLD AUTO: 12.6 %
NEUTROPHILS # BLD AUTO: 3.48 X10*3/UL (ref 1.6–5.5)
NEUTROPHILS NFR BLD AUTO: 45.3 %
NRBC BLD-RTO: 0 /100 WBCS (ref 0–0)
PHOSPHATE SERPL-MCNC: 4.3 MG/DL (ref 2.5–4.9)
PLATELET # BLD AUTO: 242 X10*3/UL (ref 150–450)
POTASSIUM SERPL-SCNC: 3.9 MMOL/L (ref 3.5–5.3)
RBC # BLD AUTO: 3.56 X10*6/UL (ref 4–5.2)
SODIUM SERPL-SCNC: 141 MMOL/L (ref 136–145)
WBC # BLD AUTO: 7.7 X10*3/UL (ref 4.4–11.3)

## 2024-06-12 PROCEDURE — 83735 ASSAY OF MAGNESIUM: CPT

## 2024-06-12 PROCEDURE — 2500000001 HC RX 250 WO HCPCS SELF ADMINISTERED DRUGS (ALT 637 FOR MEDICARE OP): Performed by: STUDENT IN AN ORGANIZED HEALTH CARE EDUCATION/TRAINING PROGRAM

## 2024-06-12 PROCEDURE — 85025 COMPLETE CBC W/AUTO DIFF WBC: CPT

## 2024-06-12 PROCEDURE — 97165 OT EVAL LOW COMPLEX 30 MIN: CPT | Mod: GO

## 2024-06-12 PROCEDURE — 2500000004 HC RX 250 GENERAL PHARMACY W/ HCPCS (ALT 636 FOR OP/ED): Performed by: STUDENT IN AN ORGANIZED HEALTH CARE EDUCATION/TRAINING PROGRAM

## 2024-06-12 PROCEDURE — 2500000002 HC RX 250 W HCPCS SELF ADMINISTERED DRUGS (ALT 637 FOR MEDICARE OP, ALT 636 FOR OP/ED)

## 2024-06-12 PROCEDURE — 80069 RENAL FUNCTION PANEL: CPT

## 2024-06-12 PROCEDURE — 1200000002 HC GENERAL ROOM WITH TELEMETRY DAILY

## 2024-06-12 PROCEDURE — 2500000004 HC RX 250 GENERAL PHARMACY W/ HCPCS (ALT 636 FOR OP/ED)

## 2024-06-12 PROCEDURE — 82330 ASSAY OF CALCIUM: CPT

## 2024-06-12 PROCEDURE — 2500000001 HC RX 250 WO HCPCS SELF ADMINISTERED DRUGS (ALT 637 FOR MEDICARE OP)

## 2024-06-12 PROCEDURE — 99231 SBSQ HOSP IP/OBS SF/LOW 25: CPT

## 2024-06-12 ASSESSMENT — COGNITIVE AND FUNCTIONAL STATUS - GENERAL
HELP NEEDED FOR BATHING: A LITTLE
DAILY ACTIVITIY SCORE: 24
MOBILITY SCORE: 24

## 2024-06-12 ASSESSMENT — PAIN SCALES - GENERAL
PAINLEVEL_OUTOF10: 0 - NO PAIN
PAINLEVEL_OUTOF10: 7

## 2024-06-12 ASSESSMENT — PAIN - FUNCTIONAL ASSESSMENT
PAIN_FUNCTIONAL_ASSESSMENT: 0-10

## 2024-06-12 ASSESSMENT — PAIN DESCRIPTION - LOCATION: LOCATION: HEAD

## 2024-06-12 ASSESSMENT — ACTIVITIES OF DAILY LIVING (ADL)
ADL_ASSISTANCE: INDEPENDENT
BATHING_ASSISTANCE: STAND BY

## 2024-06-12 ASSESSMENT — PAIN DESCRIPTION - ORIENTATION: ORIENTATION: RIGHT

## 2024-06-12 NOTE — PROGRESS NOTES
Page Huston is a 69 y.o. female on day 6 of admission presenting with Chest pain, unspecified type.      Subjective   NAEO. Pt has not experienced SOB with movement and with talking. L ptosis at baseline. No chest pain. Improved hoarseness of voice. Diplopia at baseline. Pt is awaiting 5th and final PLEX treatment tomorrow. Denies headache, nausea, vomiting, fevers, chills.        Objective     Last Recorded Vitals  /73   Pulse 66   Temp 36.2 °C (97.2 °F)   Resp 20   Wt 59.9 kg (132 lb)   SpO2 95%   Intake/Output last 3 Shifts:    Intake/Output Summary (Last 24 hours) at 6/12/2024 0844  Last data filed at 6/11/2024 1110  Gross per 24 hour   Intake 2673 ml   Output 2504 ml   Net 169 ml       Admission Weight  Weight: 59.9 kg (132 lb) (06/05/24 2119)    Daily Weight  06/05/24 : 59.9 kg (132 lb)    Physical Exam    MG-specific exam:  No ptosis noted on sustained upgaze bilaterally  Single breath count: 32  Neck flexion: 5-/5  Neck extension: 5/5  +mild hoarseness but improved  Most recent Negative Inspiratory Force (NIF): -60 (06/11/24 1525)      Mental State:  Orientation: A&Ox4  Language: Intact for comprehension, repetition, expression, naming     Cranial Nerves  - I/III: PERRL  - II: Visual fields intact to confrontation bilaterally tested, individually, and together.  Diplopia on upgaze and lateral gaze, present at midline only with long distance vision.  - III, IV, VI: EOM full to pursuit without nystagmus. Ptosis L>R.   - V: V1-V3 sensation intact bilaterally  - VII: Face muscles symmetric with smile, eye closure, eyebrow raising, and cheek puffing  - VIII: Intact to interview  - IX, X: Palate elevated symmetrically, bilaterally, mild hoarseness of voice  - XI: 5/5 strength on shoulder shrug bilaterally  - XII: Tongue midline without atrophy or fasciculations, no thrush     MOTOR EXAM:  Muscle bulk and tone were normal in UE and LE  No fasciculations, tremor, or other abnormal movements present    "  STRENGTH:      R          L  Deltoid            5         5  Biceps              5        5  Triceps             5        5                   5-       5-     Hip flexion       5-       5-  Quadriceps      5         5  Hamstrings      5         5-  DorsiFlex          5-       5-  PlantarFlex       5         5     REFLEXES:        R          L  Biceps              3          3  Brachioradialis 3          3  Patellar            2         2  Achilles            2          2  Plantar             Down      Down  No clonus     SENSORY: Intact to light touch in bilateral UE and LE   COORDINATION: Intact on finger to nose bilaterally    Assessment/Plan   Page Huston is a 69 y.o. female with a PMHx of seronegative myasthenia gravis (follows with Dr. Shipley), iron deficiency anemia (pending outpatient endoscopy with GI to evaluate for bleed) who presents to [facility] for worsening dyspnea on exertion, chest pain and reported \"abnormal EKG\" c/f MG exacerbation vs cardiac etiology. Admitted to neurology for MG exacerbation. Per Dr. Shipley, pt should receive 5 total PLEX treatments (6/6, 6/7, 6/10, 6/11, 6/13).     Compared to initial evaluation, pt has improved fatigue, neck flex/ext strength, LE/UE strength, ptosis bilaterally, diplopia, and hoarseness of voice with treatment of MG exacerbation/flare with PLEX. Ptosis and diplopia at baseline per pt. Pt no longer reports dyspnea on exertion nor with prolonged speech and denies substernal chest pain. No adverse effects from PLEX yesterday or overnight. Pt was reminded to stay hydrated.    UPDATES 6/12:  - Awaiting last PLEX treatment (5 treatments total) on 6/13     Plan  #MG exacerbation  #Seronegative MG  - Outpatient neuromuscular specialist, Dr Shipley, aware of admission  - Hold IVIG to avoid risk of hypercoagulation in the setting of a potential cardiac etiology for SOB  - NIF/VC q6h  Most recent result: Negative Inspiratory Force (NIF): -60 (06/11/24 3475)   - PLEX x 5 " days (6/6, 6/7, 6/10, 6/11, 6/13)  - C/w home mestinon IR 60q4 and ER 180mg at bedtime  - C/w home methotrexate 20mg qweek and prednisone 10mg  [ ] LRP4 Ab pending - sent per Dr Violetta hinkle  - Will follow up with neuromuscular outpatient      #Exertional Dyspnea  #Chest pain  #c/f unstable angina  ::Trops and BNP WNL  ::LDL 94, A1C 5.7  ::TSH WNL  ::EKG with isolated nonpathologic Q wave in lead III (stable since 2019)  ::Cardiology signed off 6/8  ::ASCVD risk 18.9  - TTE complete 6/8: EF 70%, mild AR, anterobasal septal hypertophy w/sigmoid septum  - CT Coronary 6/7: nonobstructive CAD, small hiatal hernia, kidney cysts - will follow up with PCP outpatient  - Atorva 40 started per cardiology recs  - Continue ASA 81mg given past TIA  [x] Will request outpatient cardiology follow up  [ ] Consider outpatient pulmonology for continued SOB     #Iron Deficiency Anemia  #Chronic Anemia  ::baseline Hg 9-10, on admission Hg 9.8  ::Folic acid, B12 WNL  ::Iron 36, TIBC 445, %Sat 8%, ferritin 17, RDW 16.9  ::homocysteine 8.17  ::MMA <0.10  ::Medicine consulted, signed off on 6/6/24  - C/w home folic acid 1mg  - Started oral Ferrous Sulfate 325mg daily  [X ] PCP follow up     #Chronic back pain  #Anxiety  #Insomnia  - Continue home gabapentin 300mg at bedtime  - Continue home trazadone 25mg PRN  - Continue home duloxetine DR 60mg daily  - Lidocaine patch and Diclofenac gel PRN  - Acetaminophen PRN     F: PRN  E: PRN  N: regular diet  DVT ppx: Lovenox  GI ppx: Protonix  Dispo: Home      Access:  - PIV  - R internal jugular CVC (6/6- )     ------------------------------------------------------------------------------------------------------------------  Medications that should not be used in Myasthenia Gravis     *Absolute Contraindication (are life-threatening):  Curare, D-penicillamine, Botulinum/Botox, Alpha-interferon     *Contraindications (should be avoided):  Antibiotics:   -Aminoglycosides: Gentamycin, Kanamycin,  Amikacin, Neomycin, Streptomycin, Tobarmycin, Netilmycin, Paromomycin, Spectinomycin.  -Anything that ends w/ MYCIN (including Vancomycin).  -Macrolides: Azithromycin (Z-pack), Erythromycin, Claithromycin (Biaxin), Telithromycin.  -Flouroquinolones: Ciprofloxacin (Cipro), Nofloxacin, Levofloxacin.  Antimalarials:  -Chloroquine, Hydroxycholoroquine  Anti-arrhythmics:  -Quinidine, Procainamide, Etafenone, Peruvoside  Magnesium:  -Oral tablets, IV Magnesium replacement.     *Use With Caution:  Antihypertensives:  -CCB: Verapamil, Nifedipine, Felodipine.  -BBC: Propranolol, Atenolol, Acebutolol, Practolol, Oxprenolol, Sotalol, Nadolol and Ophthalmic Timolol.  -Lithium.  -Any new med  -Iodinated Contrast Agents.  -Steroids (especially if high dose and patient is steroid-naiive).     Antibiotics relatively safe in MG:  Penicillins (including Zosyn), Cephalosporins, Carbapenems, Doxycyline, Linezolid      KRISTIN ARMENDARIZ, MS3  [facility]

## 2024-06-12 NOTE — PROGRESS NOTES
Occupational Therapy    Evaluation & Discharge    Patient Name: Page Huston  MRN: [id number]  Today's Date: 6/12/2024  Time Calculation  Start Time: 0908  Stop Time: 0929  Time Calculation (min): 21 min        Assessment:  OT Assessment: Pt presents with deficits in UE coordination, high level balance, oculomotor function impeding occupational performance with IADLs. Discussed any possible nonpharmacalogical interventions for UE pain management/decrease frequency of cramping/ID triggers. Nearing functional baseline and good insight to modified techniques. No acute OT needs identified.    Barriers to Discharge: None  Evaluation/Treatment Tolerance: Patient tolerated treatment well  End of Session Communication: Bedside nurse  End of Session Patient Position: Bed, 2 rail up, Alarm off, not on at start of session  OT Assessment Results: Decreased IADLs, Decreased gross motor control, Decreased fine motor control  Barriers to Discharge: None  Evaluation/Treatment Tolerance: Patient tolerated treatment well  Strengths: Coping skills, Premorbid level of function, Physical health, Ability to acquire knowledge, Attitude of self  Barriers to Participation:  (None)  Plan:  No Skilled OT:  (Ind w/ ADLs, at baseline)  OT Frequency: OT eval only  OT Discharge Recommendations: No further acute OT, No OT needed after discharge  Equipment Recommended upon Discharge:  (None)  OT Recommended Transfer Status: Independent  OT - OK to Discharge: Yes       Subjective   Current Problem:  1. Chest pain, unspecified type  Transthoracic Echo (TTE) Complete    Transthoracic Echo (TTE) Complete    atorvastatin (Lipitor) 40 mg tablet    Referral to Cardiology      2. Iron deficiency anemia, unspecified iron deficiency anemia type  ferrous sulfate, 325 mg ferrous sulfate, tablet    Referral to Primary Care      3. History of TIA (transient ischemic attack)  aspirin 81 mg EC tablet    atorvastatin (Lipitor) 40 mg tablet      4. Vitamin D deficiency   "cholecalciferol (Vitamin D-3) 5,000 Units tablet        General:  General  Reason for Referral: chest pain and shortness of breath having NG exacerbation  Past Medical History Relevant to Rehab: seronegative myasthenia gravis (follows with Dr. Shipley), iron deficiency anemia (pending outpatient endoscopy with GI to evaluate for bleed) who presents to [facility] for worsening dyspnea on exertion, chest pain and reported \"abnormal EKG\" c/f MG exacerbation vs cardiac etiology.  Missed Visit: No  Family/Caregiver Present: No  Patient Position Received: Bed, 2 rail up, Alarm off, not on at start of session  General Comment: Pt pleasant and cooperative throughout. Good insight to deficits and task grading PRN  Precautions:  Medical Precautions: Fall precautions  Vital Signs:  Heart Rate: 70  Heart Rate Source: Monitor  Pain:  Pain Assessment  Pain Assessment: 0-10  Pain Score: 0 - No pain    Objective   Cognition:  Overall Cognitive Status: Within Functional Limits  Orientation Level: Oriented X4  Attention: Within Functional Limits  Insight: Within function limits     Confusion Assessment Method (CAM)  Acute Onset and Fluctuating Course (1A): No     Home Living:  Type of Home: Condo  Lives With: Alone  Home Layout: One level  Home Access: Stairs to enter with rails  Entrance Stairs-Number of Steps: 1  Bathroom Shower/Tub: Walk-in shower  Bathroom Equipment: Shower chair with back  Prior Function:  Level of [place]: Independent with ADLs and functional transfers, Independent with homemaking with ambulation  ADL Assistance: Independent  Homemaking Assistance: Independent  Ambulatory Assistance: Independent  Vocational: Part time employment ( at [employer])  Prior Function Comments: 2 falls in past 6 months 2* to severe cramping. (+) driving. significant other can assist PRN      ADL:  Eating Assistance: Independent  Grooming Assistance: Independent  Bathing Assistance: Stand by  UE Dressing Assistance: " Independent  LE Dressing Assistance: Independent  Toileting Assistance with Device: Independent  Activity Tolerance:  Endurance: Endurance does not limit participation in activity  Early Mobility/Exercise Safety Screen: Proceed with mobilization - No exclusion criteria met  Bed Mobility/Transfers: Bed Mobility  Bed Mobility: Yes  Bed Mobility 1  Bed Mobility 1: Supine to sitting, Sitting to supine, Long sit, Forward lean  Level of Assistance 1: Independent    Sitting Balance:  Static Sitting Balance  Static Sitting-Balance Support: Feet supported  Static Sitting-Level of Assistance: Independent     Vision:Vision - Basic Assessment  Current Vision: Wears glasses all the time (prisms)  Patient Visual Report: Diplopia   and Vision - Complex Assessment  Convergence:  (impaired)  Vision Comments: baseline diplopia 2* to oculomotor deficits  Sensation:  Sensation Comment: fluctuating n/t in digits    Perception:  Inattention/Neglect: Appears intact  Initiation: Appears intact  Motor Planning: Appears intact  Coordination:      Hand Function:  Gross Grasp: Functional  Coordination: Impaired  Extremities: RUE   RUE : Within Functional Limits and LUE   LUE: Within Functional Limits    Outcome Measures:[facility] Daily Activity  Bathing (including washing, rinsing, drying): A little  Putting on and taking off regular upper body clothing: None  Toileting, which includes using toilet, bedpan or urinal: None  Taking care of personal grooming such as brushing teeth: None  Eating Meals: None         and Brief Confusion Assessment Method (bCAM)  Feature 1: Altered Mental Status or Fluctuating Course: No  CAM Result: CAM -    Education Documentation  No documentation found.  Education Comments  No comments found.      MARION PALMA, OT

## 2024-06-12 NOTE — CARE PLAN
The patient's goals for the shift include      The clinical goals for the shift include pt will remain pain free.

## 2024-06-13 ENCOUNTER — APPOINTMENT (OUTPATIENT)
Dept: OTHER | Facility: HOSPITAL | Age: 69
End: 2024-06-13
Payer: COMMERCIAL

## 2024-06-13 VITALS
BODY MASS INDEX: 23.39 KG/M2 | HEART RATE: 61 BPM | RESPIRATION RATE: 20 BRPM | HEIGHT: 63 IN | TEMPERATURE: 97.2 F | DIASTOLIC BLOOD PRESSURE: 67 MMHG | SYSTOLIC BLOOD PRESSURE: 123 MMHG | OXYGEN SATURATION: 98 % | WEIGHT: 132 LBS

## 2024-06-13 LAB
ALBUMIN SERPL BCP-MCNC: 3.9 G/DL (ref 3.4–5)
ANION GAP SERPL CALC-SCNC: 11 MMOL/L (ref 10–20)
APTT PPP: 26 SECONDS (ref 27–38)
BASOPHILS # BLD AUTO: 0.08 X10*3/UL (ref 0–0.1)
BASOPHILS NFR BLD AUTO: 1 %
BLOOD EXPIRATION DATE: NORMAL
BUN SERPL-MCNC: 21 MG/DL (ref 6–23)
CA-I BLD-SCNC: 1.2 MMOL/L (ref 1.1–1.33)
CA-I BLD-SCNC: 1.22 MMOL/L (ref 1.1–1.33)
CALCIUM SERPL-MCNC: 8.7 MG/DL (ref 8.6–10.6)
CHLORIDE SERPL-SCNC: 106 MMOL/L (ref 98–107)
CO2 SERPL-SCNC: 27 MMOL/L (ref 21–32)
COMPONENT CODE: NORMAL
CREAT SERPL-MCNC: 0.77 MG/DL (ref 0.5–1.05)
DIAGNOSIS-BB-PR33: NORMAL
DIAGNOSIS-BB-PR33: NORMAL
DISPENSE STATUS: NORMAL
EGFRCR SERPLBLD CKD-EPI 2021: 83 ML/MIN/1.73M*2
EOSINOPHIL # BLD AUTO: 0.24 X10*3/UL (ref 0–0.4)
EOSINOPHIL NFR BLD AUTO: 2.9 %
ERYTHROCYTE [DISTWIDTH] IN BLOOD BY AUTOMATED COUNT: 18.6 % (ref 11.5–14.5)
ERYTHROCYTE [DISTWIDTH] IN BLOOD BY AUTOMATED COUNT: 18.7 % (ref 11.5–14.5)
FIBRINOGEN PPP-MCNC: 199 MG/DL (ref 200–400)
GLUCOSE SERPL-MCNC: 82 MG/DL (ref 74–99)
HCT VFR BLD AUTO: 30.9 % (ref 36–46)
HCT VFR BLD AUTO: 31.1 % (ref 36–46)
HGB BLD-MCNC: 9.5 G/DL (ref 12–16)
HGB BLD-MCNC: 9.8 G/DL (ref 12–16)
IMM GRANULOCYTES # BLD AUTO: 0.05 X10*3/UL (ref 0–0.5)
IMM GRANULOCYTES NFR BLD AUTO: 0.6 % (ref 0–0.9)
INR PPP: 0.9 (ref 0.9–1.1)
LYMPHOCYTES # BLD AUTO: 3.17 X10*3/UL (ref 0.8–3)
LYMPHOCYTES NFR BLD AUTO: 38.5 %
MCH RBC QN AUTO: 27.3 PG (ref 26–34)
MCH RBC QN AUTO: 27.5 PG (ref 26–34)
MCHC RBC AUTO-ENTMCNC: 30.7 G/DL (ref 32–36)
MCHC RBC AUTO-ENTMCNC: 31.5 G/DL (ref 32–36)
MCV RBC AUTO: 87 FL (ref 80–100)
MCV RBC AUTO: 89 FL (ref 80–100)
MONOCYTES # BLD AUTO: 0.87 X10*3/UL (ref 0.05–0.8)
MONOCYTES NFR BLD AUTO: 10.6 %
NEUTROPHILS # BLD AUTO: 3.82 X10*3/UL (ref 1.6–5.5)
NEUTROPHILS NFR BLD AUTO: 46.4 %
NRBC BLD-RTO: 0 /100 WBCS (ref 0–0)
NRBC BLD-RTO: 0 /100 WBCS (ref 0–0)
PATH REVIEW-TRANSFUSION REACTION: NORMAL
PATH REVIEW-TRANSFUSION REACTION: NORMAL
PHOSPHATE SERPL-MCNC: 4.5 MG/DL (ref 2.5–4.9)
PLATELET # BLD AUTO: 262 X10*3/UL (ref 150–450)
PLATELET # BLD AUTO: 269 X10*3/UL (ref 150–450)
POTASSIUM SERPL-SCNC: 3.8 MMOL/L (ref 3.5–5.3)
PRODUCT BLOOD TYPE: 5100
PRODUCT CODE: NORMAL
PROTHROMBIN TIME: 10.3 SECONDS (ref 9.8–12.8)
RBC # BLD AUTO: 3.48 X10*6/UL (ref 4–5.2)
RBC # BLD AUTO: 3.56 X10*6/UL (ref 4–5.2)
RESIDENT REVIEW-BB: NORMAL
RESIDENT REVIEW-BB: NORMAL
SODIUM SERPL-SCNC: 140 MMOL/L (ref 136–145)
TYPE OF REACTION: NORMAL
TYPE OF REACTION: NORMAL
UNIT ABO: NORMAL
UNIT NUMBER: NORMAL
UNIT RH: NORMAL
UNIT VOLUME: 208
UNIT VOLUME: 217
UNIT VOLUME: 222
WBC # BLD AUTO: 14.1 X10*3/UL (ref 4.4–11.3)
WBC # BLD AUTO: 8.2 X10*3/UL (ref 4.4–11.3)

## 2024-06-13 PROCEDURE — P9017 PLASMA 1 DONOR FRZ W/IN 8 HR: HCPCS

## 2024-06-13 PROCEDURE — 82330 ASSAY OF CALCIUM: CPT

## 2024-06-13 PROCEDURE — 2500000001 HC RX 250 WO HCPCS SELF ADMINISTERED DRUGS (ALT 637 FOR MEDICARE OP)

## 2024-06-13 PROCEDURE — 85384 FIBRINOGEN ACTIVITY: CPT

## 2024-06-13 PROCEDURE — 85027 COMPLETE CBC AUTOMATED: CPT

## 2024-06-13 PROCEDURE — 86078 PHYS BLOOD BANK SERV REACTJ: CPT

## 2024-06-13 PROCEDURE — 2500000004 HC RX 250 GENERAL PHARMACY W/ HCPCS (ALT 636 FOR OP/ED)

## 2024-06-13 PROCEDURE — 36514 APHERESIS PLASMA: CPT

## 2024-06-13 PROCEDURE — 82330 ASSAY OF CALCIUM: CPT | Mod: 91

## 2024-06-13 PROCEDURE — 85610 PROTHROMBIN TIME: CPT

## 2024-06-13 PROCEDURE — 2500000004 HC RX 250 GENERAL PHARMACY W/ HCPCS (ALT 636 FOR OP/ED): Performed by: STUDENT IN AN ORGANIZED HEALTH CARE EDUCATION/TRAINING PROGRAM

## 2024-06-13 PROCEDURE — P9045 ALBUMIN (HUMAN), 5%, 250 ML: HCPCS | Mod: JZ

## 2024-06-13 PROCEDURE — 2500000001 HC RX 250 WO HCPCS SELF ADMINISTERED DRUGS (ALT 637 FOR MEDICARE OP): Performed by: STUDENT IN AN ORGANIZED HEALTH CARE EDUCATION/TRAINING PROGRAM

## 2024-06-13 PROCEDURE — 99231 SBSQ HOSP IP/OBS SF/LOW 25: CPT

## 2024-06-13 PROCEDURE — 84100 ASSAY OF PHOSPHORUS: CPT

## 2024-06-13 PROCEDURE — 2500000002 HC RX 250 W HCPCS SELF ADMINISTERED DRUGS (ALT 637 FOR MEDICARE OP, ALT 636 FOR OP/ED)

## 2024-06-13 PROCEDURE — 85025 COMPLETE CBC W/AUTO DIFF WBC: CPT

## 2024-06-13 RX ORDER — ACETAMINOPHEN 325 MG/1
650 TABLET ORAL ONCE
Status: COMPLETED | OUTPATIENT
Start: 2024-06-13 | End: 2024-06-13

## 2024-06-13 RX ORDER — ASPIRIN 81 MG/1
81 TABLET ORAL DAILY
Qty: 30 TABLET | Refills: 2 | Status: SHIPPED | OUTPATIENT
Start: 2024-06-13 | End: 2024-09-11

## 2024-06-13 RX ORDER — DIPHENHYDRAMINE HYDROCHLORIDE 50 MG/ML
25 INJECTION INTRAMUSCULAR; INTRAVENOUS ONCE
Status: DISCONTINUED | OUTPATIENT
Start: 2024-06-13 | End: 2024-06-13 | Stop reason: HOSPADM

## 2024-06-13 RX ORDER — ALBUMIN HUMAN 50 G/1000ML
12.5 SOLUTION INTRAVENOUS
Status: COMPLETED | OUTPATIENT
Start: 2024-06-13 | End: 2024-06-13

## 2024-06-13 RX ORDER — HEPARIN SODIUM 1000 [USP'U]/ML
1100 INJECTION, SOLUTION INTRAVENOUS; SUBCUTANEOUS ONCE
Status: COMPLETED | OUTPATIENT
Start: 2024-06-13 | End: 2024-06-13

## 2024-06-13 RX ORDER — CALCIUM CARBONATE 200(500)MG
1500 TABLET,CHEWABLE ORAL EVERY 5 MIN PRN
Status: DISCONTINUED | OUTPATIENT
Start: 2024-06-13 | End: 2024-06-13 | Stop reason: HOSPADM

## 2024-06-13 RX ORDER — DIPHENHYDRAMINE HYDROCHLORIDE 50 MG/ML
25 INJECTION INTRAMUSCULAR; INTRAVENOUS EVERY 5 MIN PRN
Status: DISCONTINUED | OUTPATIENT
Start: 2024-06-13 | End: 2024-06-13 | Stop reason: HOSPADM

## 2024-06-13 RX ORDER — DIPHENHYDRAMINE HCL 25 MG
50 CAPSULE ORAL ONCE
Status: COMPLETED | OUTPATIENT
Start: 2024-06-13 | End: 2024-06-13

## 2024-06-13 RX ORDER — ACETAMINOPHEN 500 MG
5000 TABLET ORAL DAILY
Qty: 30 TABLET | Refills: 2 | Status: SHIPPED | OUTPATIENT
Start: 2024-06-13 | End: 2024-09-11

## 2024-06-13 RX ORDER — CALCIUM GLUCONATE 20 MG/ML
3747 INJECTION, SOLUTION INTRAVENOUS ONCE
Status: COMPLETED | OUTPATIENT
Start: 2024-06-13 | End: 2024-06-13

## 2024-06-13 RX ORDER — ATORVASTATIN CALCIUM 40 MG/1
40 TABLET, FILM COATED ORAL NIGHTLY
Qty: 30 TABLET | Refills: 2 | Status: SHIPPED | OUTPATIENT
Start: 2024-06-13 | End: 2024-09-11

## 2024-06-13 RX ORDER — FERROUS SULFATE 325(65) MG
65 TABLET ORAL
Qty: 30 TABLET | Refills: 2 | Status: SHIPPED | OUTPATIENT
Start: 2024-06-13 | End: 2024-09-11

## 2024-06-13 NOTE — POST-PROCEDURE NOTE
Page Huston [id number]       Procedure type: Plasma Exchange    Replacement Fluids:  75%  Albumin /  25%  Plasma     Procedure Completed Transfusion reaction and/or adverse event noted.    Appropriate actions taken.   Attending notified of completion status. See flow sheet(s) for additional details.  Post-Vitals listed below.    Post-procedure vitals:  Vitals @ 1125  BP: 123/67  Temp: 36.2 °C (97.2 °F)  Temp Source: Temporal [Temporal]  Heart Rate: 61  Resp: 20  SpO2: 98 %     1055: C/o itching under left arm, to sternum and right back. Hive x1 noted to right back. Procedure paused and 25 mg IV Benadryl given. Symptoms resolved and procedure restarted @ 1104. TM team notified. T4 RN aware.    Ramil Reyes, RN

## 2024-06-13 NOTE — CARE PLAN
Pt remained safe and stable throughout shift. Denied pain and discomfort. Ambulated well without assistance. No acute events over night.      Problem: Pain  Goal: My pain/discomfort is manageable  Outcome: Progressing     Problem: Safety  Goal: Patient will be injury free during hospitalization  Outcome: Progressing  Goal: I will remain free of falls  Outcome: Progressing     Problem: Daily Care  Goal: Daily care needs are met  Outcome: Progressing     Problem: Psychosocial Needs  Goal: Demonstrates ability to cope with hospitalization/illness  Outcome: Progressing  Goal: Collaborate with me, my family, and caregiver to identify my specific goals  Outcome: Progressing     Problem: Discharge Barriers  Goal: My discharge needs are met  Outcome: Progressing     Problem: Pain  Goal: Takes deep breaths with improved pain control throughout the shift  Outcome: Progressing  Goal: Turns in bed with improved pain control throughout the shift  Outcome: Progressing  Goal: Walks with improved pain control throughout the shift  Outcome: Progressing  Goal: Performs ADL's with improved pain control throughout shift  Outcome: Progressing  Goal: Participates in PT with improved pain control throughout the shift  Outcome: Progressing  Goal: Free from opioid side effects throughout the shift  Outcome: Progressing  Goal: Free from acute confusion related to pain meds throughout the shift  Outcome: Progressing

## 2024-06-13 NOTE — PROGRESS NOTES
"Page Huston is a 69 y.o. female on day 7 of admission presenting with Chest pain, unspecified type.      Subjective   NAEO. Pt has not experienced SOB with movement, but with prolonged talking with the writer she felt mildly SOB. Eyelids and diplopia at baseline. No chest pain. Pt says that she feels at baseline except for her mild hoarseness of voice. Pt is awaiting 5th and final PLEX treatment today. Denies headache, nausea, vomiting, fevers, chills.  Objective   Last Recorded Vitals  Blood pressure 112/65, pulse 62, temperature 36.1 °C (97 °F), resp. rate 20, height 1.6 m (5' 3\"), weight 59.9 kg (132 lb), SpO2 95%.      Neurological Exam  MG-specific exam:  No ptosis noted on sustained upgaze bilaterally. Still has diplopia with upgaze.  Single breath count: 32  Neck flexion: 5/5  Neck extension: 5/5  Most recent Negative Inspiratory Force (NIF): -60 (06/12/24 1146)      Mental State:  Orientation: A&Ox4  Language: Intact for comprehension, repetition, expression, naming     Cranial Nerves  - I/III: PERRL  - II: Visual fields intact to confrontation bilaterally tested, individually, and together.  Diplopia on upgaze and lateral gaze, present at midline only with long distance vision.  - III, IV, VI: EOM full to pursuit without nystagmus.   - V: V1-V3 sensation intact bilaterally  - VII: Face muscles symmetric with smile, eye closure, eyebrow raising, and cheek puffing  - VIII: Intact to interview  - IX, X: Palate elevated symmetrically, bilaterally, mild hoarseness of voice  - XI: 5/5 strength on shoulder shrug bilaterally  - XII: Tongue midline without atrophy or fasciculations, no thrush     MOTOR EXAM:  Muscle bulk and tone were normal in UE and LE  No fasciculations, tremor, or other abnormal movements present     STRENGTH:      R          L  Deltoid            5         5  Biceps              5        5  Triceps             5        5                   5        5     Hip flexion       5       " "5-  Quadriceps      5         5  Hamstrings      5         5  DorsiFlex          5        5  PlantarFlex       5         5     REFLEXES:        R          L  Biceps              2          2  Brachioradialis 2          2  Patellar            2          2  Achilles            2          2  Plantar             Down      Down  No clonus     SENSORY: Intact to light touch in bilateral UE and LE   COORDINATION: Intact on finger to nose bilaterally  Relevant Results    This patient has a central line              Reason for the central line remaining today? Dialysis/Hemapheresis    Assessment/Plan   Page Huston is a 69 y.o. female with a PMHx of seronegative myasthenia gravis (follows with Dr. Shipley), iron deficiency anemia (pending outpatient endoscopy with GI to evaluate for bleed) who presents to [facility] for worsening dyspnea on exertion, chest pain and reported \"abnormal EKG\" c/f MG exacerbation vs cardiac etiology. Admitted to neurology for MG exacerbation. Pt will receive 5 total PLEX treatments (6/6, 6/7, 6/10, 6/11, 6/13).     Pt continues to improve with treatment of MG exacerbation/flare with PLEX. Eyelids and diplopia at baseline per pt. Pt feels at baseline with the exception of mild hoarseness of voice which is improving. Pt no longer reports dyspnea on exertion with mild shortness of breath with prolonged talking and denies substernal chest pain. Pt awaiting discharge and will follow up with medicine and cardiology outpatient for management of iron deficiency anemia and shortness of breath.    Updates 6/13:  - Last PLEX treatment today 6/13 (5 treatments total)  - Will remove CVC after PLEX    Plan  #MG exacerbation  #Seronegative MG  - Outpatient neuromuscular specialist, Dr Shipley, aware of admission  - Hold IVIG to avoid risk of hypercoagulation in the setting of a potential cardiac etiology for SOB  - NIF/VC q6h  Most recent result: Negative Inspiratory Force (NIF): -60 (06/12/24 1146)   - PLEX x 5 days " (6/6, 6/7, 6/10, 6/11, 6/13)  - C/w home mestinon IR 60q4 and ER 180mg at bedtime  - C/w home methotrexate 20mg qweek and prednisone 10mg  [ ] LRP4 Ab pending - sent per Dr Violetta hinkle  - Will follow up with neuromuscular outpatient      #Exertional Dyspnea  #Chest pain  #c/f unstable angina  ::Trops and BNP WNL  ::LDL 94, A1C 5.7  ::TSH WNL  ::EKG with isolated nonpathologic Q wave in lead III (stable since 2019)  ::Cardiology signed off 6/8  ::ASCVD risk 18.9  - TTE complete 6/8: EF 70%, mild AR, anterobasal septal hypertophy w/sigmoid septum  - CT Coronary 6/7: nonobstructive CAD, small hiatal hernia, kidney cysts - will follow up with PCP outpatient  - Atorva 40 started per cardiology recs  - Continue ASA 81mg given past TIA  [x] Will request outpatient cardiology follow up     #Iron Deficiency Anemia  #Chronic Anemia  ::baseline Hg 9-10, on admission Hg 9.8  ::Folic acid, B12 WNL  ::Iron 36, TIBC 445, %Sat 8%, ferritin 17, RDW 16.9  ::homocysteine 8.17  ::MMA <0.10  ::Medicine consulted, signed off on 6/6/24  - C/w home folic acid 1mg  - Started oral Ferrous Sulfate 325mg daily  [X ] PCP follow up     #Chronic back pain  #Anxiety  #Insomnia  - Continue home gabapentin 300mg at bedtime  - Continue home trazadone 25mg PRN  - Continue home duloxetine DR 60mg daily  - Lidocaine patch and Diclofenac gel PRN  - Acetaminophen PRN     F: PRN  E: PRN  N: regular diet  DVT ppx: Lovenox  GI ppx: Protonix  Dispo: Home      Access:  - PIV  - R internal jugular CVC (6/6-6/13 )     ------------------------------------------------------------------------------------------------------------------  Medications that should not be used in Myasthenia Gravis     *Absolute Contraindication (are life-threatening):  Curare, D-penicillamine, Botulinum/Botox, Alpha-interferon     *Contraindications (should be avoided):  Antibiotics:   -Aminoglycosides: Gentamycin, Kanamycin, Amikacin, Neomycin, Streptomycin, Tobarmycin, Netilmycin,  Paromomycin, Spectinomycin.  -Anything that ends w/ MYCIN (including Vancomycin).  -Macrolides: Azithromycin (Z-pack), Erythromycin, Claithromycin (Biaxin), Telithromycin.  -Flouroquinolones: Ciprofloxacin (Cipro), Nofloxacin, Levofloxacin.  Antimalarials:  -Chloroquine, Hydroxycholoroquine  Anti-arrhythmics:  -Quinidine, Procainamide, Etafenone, Peruvoside  Magnesium:  -Oral tablets, IV Magnesium replacement.     *Use With Caution:  Antihypertensives:  -CCB: Verapamil, Nifedipine, Felodipine.  -BBC: Propranolol, Atenolol, Acebutolol, Practolol, Oxprenolol, Sotalol, Nadolol and Ophthalmic Timolol.  -Lithium.  -Any new med  -Iodinated Contrast Agents.  -Steroids (especially if high dose and patient is steroid-naiive).     Antibiotics relatively safe in MG:  Penicillins (including Zosyn), Cephalosporins, Carbapenems, Doxycyline, Linezolid      Pt was seen and discussed with Neurology attending Dr. Jamison.     KRISTIN ARMENDARIZ, MS3  [facility]

## 2024-06-13 NOTE — DISCHARGE SUMMARY
"Discharge Diagnosis  Chest pain, unspecified type    Issues Requiring Follow-Up  [ ] PCP: Follow up kidney cyst and iron deficiency anemia   [ ] NM: Follow up LRP4 Ab     Test Results Pending At Discharge  Pending Labs       Order Current Status    CBC and Auto Differential Collected (06/05/24 1356)    LRP4 Autoantibody; Addie; 1483 - Miscellaneous Test In process    Path Review-Transfusion Reaction In process            Hospital Course  69 y.o. female with a PMHx of seronegative myasthenia gravis and iron deficiency anemia who presented to [facility] for worsening dyspnea on exertion, chest pain and reported \"abnormal EKG\" c/f MG exacerbation vs cardiac etiology. Pt is currently managed by mestinon IR 60 q4 and  mg at bedtime, methotrexate 20 mg qweek, prednisone 10 mg, and IVIG q3wks for MG. Pt's IVIG was held inpatient due to risk of hypercoagulation due to potential cardiac etiology for SOB. Exam notable for neck flex/ext weakness, LE/UE weakness, worsening L>R ptosis, hoarseness of voice, diplopia on upgaze and lateral gaze (without prism). Consulted cardiology for SOB on exertion on 1st day of admission. TTE showed normal EF and mild AR and CT Coronary showed nonobstructive CAD, small hiatal hernia, kidney cysts. 10-year ASCVD risk score 18.9, moderate risk. Pt was started on Atorvastatin 40 mg at bedtime (has been refusing while inpatient). Due to history of questionable TIA (8/2023 numbness, slurring speech, vision changes 2 hours), pt was started on Aspirin 81 mg. Medicine consulted for iron deficiency anemia, and pt was started on ferrous sulfate 325 mg PO. Clinical course is consistent with MG exacerbation. LRP4 Ab pending. In total, pt received 5 days of PLEX (6/6, 6/7, 6/10, 6/11, 6/13). Pt discharged on existing MG regimen including resumption of IVIG q3 weeks. She demonstrated significant improvement in strength with PLEX. Was discharged home on 6/13.       Pertinent Physical Exam At Time of " Discharge  MG-specific exam:  No ptosis noted on sustained upgaze bilaterally. Still has diplopia with upgaze.  Single breath count: 32  Neck flexion: 5/5  Neck extension: 5/5  Most recent Negative Inspiratory Force (NIF): -60 (06/12/24 1146)      Mental State:  Orientation: A&Ox4  Language: Intact for comprehension, repetition, expression, naming     Cranial Nerves  - I/III: PERRL  - II: Visual fields intact to confrontation bilaterally tested, individually, and together.  Diplopia on upgaze and lateral gaze, present at midline only with long distance vision.  - III, IV, VI: EOM full to pursuit without nystagmus.   - V: V1-V3 sensation intact bilaterally  - VII: Face muscles symmetric with smile, eye closure, eyebrow raising, and cheek puffing  - VIII: Intact to interview  - IX, X: Palate elevated symmetrically, bilaterally, mild hoarseness of voice  - XI: 5/5 strength on shoulder shrug bilaterally  - XII: Tongue midline without atrophy or fasciculations, no thrush     MOTOR EXAM:  Muscle bulk and tone were normal in UE and LE  No fasciculations, tremor, or other abnormal movements present     STRENGTH:      R          L  Deltoid            5         5  Biceps              5        5  Triceps             5        5                   5        5     Hip flexion       5       5-  Quadriceps      5         5  Hamstrings      5         5  DorsiFlex          5        5  PlantarFlex       5         5     REFLEXES:        R          L  Biceps              2          2  Brachioradialis 2          2  Patellar            2          2  Achilles            2          2  Plantar             Down      Down  No clonus     SENSORY: Intact to light touch in bilateral UE and LE   COORDINATION: Intact on finger to nose bilaterally    Home Medications     Medication List      START taking these medications     aspirin 81 mg EC tablet; Take 1 tablet (81 mg) by mouth once daily.   atorvastatin 40 mg tablet; Commonly known as:  Lipitor; Take 1 tablet (40   mg) by mouth once daily at bedtime.   cholecalciferol 5,000 Units tablet; Commonly known as: Vitamin D-3; Take   1 tablet (5,000 Units) by mouth once daily.   ferrous sulfate (325 mg ferrous sulfate) tablet; Take 1 tablet by mouth   once daily with breakfast.     CHANGE how you take these medications     predniSONE 10 mg tablet; Commonly known as: Deltasone; What changed:   Another medication with the same name was removed. Continue taking this   medication, and follow the directions you see here.     CONTINUE taking these medications     Adrenalin 1 mg/mL (1 mL) injection; Generic drug: EPINEPHrine   cyanocobalamin 500 mcg tablet; Commonly known as: Vitamin B-12   DULoxetine 60 mg DR capsule; Commonly known as: Cymbalta   folic acid 1 mg tablet; Commonly known as: Folvite; Take 1 tablet (1 mg)   by mouth once daily.   gabapentin 100 mg capsule; Commonly known as: Neurontin; Take 3 capsules   (300 mg) by mouth once daily at bedtime.   methotrexate 2.5 mg tablet; Commonly known as: Trexall; Take 8 tablets   (20 mg total) by mouth 1 (one) time per week.   pantoprazole 40 mg EC tablet; Commonly known as: ProtoNix   * pyridostigmine 60 mg tablet; Commonly known as: Mestinon   * pyridostigmine 180 mg ER tablet; Commonly known as: Mestinon; Take 1   tablet (180 mg) by mouth once daily at bedtime.   traZODone 50 mg tablet; Commonly known as: Desyrel; TAKE 1/2 TO 1 TABS   AT BEDTIME  * This list has 2 medication(s) that are the same as other medications   prescribed for you. Read the directions carefully, and ask your doctor or   other care provider to review them with you.     STOP taking these medications     cephalexin 500 mg capsule; Commonly known as: Keflex   diphenhydrAMINE 50 mg/mL injection; Commonly known as: BENADryl   fluconazole 100 mg tablet; Commonly known as: Diflucan   mupirocin 2 % ointment; Commonly known as: Bactroban   sodium chloride 0.9% solution       Outpatient  Follow-Up  Future Appointments   Date Time Provider Department Center   7/23/2024  8:00 AM Phil Felix PA-C [id number] [facility]   9/25/2024 11:30 AM Elsy Shipley MD [id number] Academic   10/7/2024  9:00 AM Vin White MD [id number] Orville Blue MD

## 2024-06-13 NOTE — NURSING NOTE
06/13/2024 1600 Nursing Note-Discharge  Pt discharged to home with no needs. No IV access at time discharge. Discharge instruction reviewed/discussed with pt. Personal belongings transported with pt.-------------------  ---------------------Priscilla Dumont RN

## 2024-06-13 NOTE — POST-PROCEDURE NOTE
This is a 69 y.o. female with Myasthenia Gravis who underwent therapeutic plasma exchange (TPE) with 75% albumin and 25% plasma as replacement fluid.     I saw and evaluated the patient during the TPE.  The patient was resting in bed.  The vascular access functioned well.     Pre-procedure labs:  WBC   Date/Time Value Ref Range Status   06/13/2024 06:09 AM 8.2 4.4 - 11.3 x10*3/uL Final     Hemoglobin   Date/Time Value Ref Range Status   06/13/2024 06:09 AM 9.8 (L) 12.0 - 16.0 g/dL Final     Hematocrit   Date/Time Value Ref Range Status   06/13/2024 06:09 AM 31.1 (L) 36.0 - 46.0 % Final     Platelets   Date/Time Value Ref Range Status   06/13/2024 06:09  150 - 450 x10*3/uL Final        POCT Calcium, Ionized   Date/Time Value Ref Range Status   06/13/2024 06:09 AM 1.20 1.1 - 1.33 mmol/L Final     Comment:     The performance characteristics of ionized calcium tested  in heparinized plasma or serum have been validated by the  individual  laboratory site where testing is performed.   Testing on heparinized plasma or serum is not approved by   the FDA; however, such approval is not necessary.        Protime   Date/Time Value Ref Range Status   06/13/2024 06:09 AM 10.3 9.8 - 12.8 seconds Final     INR   Date/Time Value Ref Range Status   06/13/2024 06:09 AM 0.9 0.9 - 1.1 Final     aPTT   Date/Time Value Ref Range Status   06/13/2024 06:09 AM 26 (L) 27 - 38 seconds Final     Fibrinogen   Date/Time Value Ref Range Status   06/13/2024 06:09  (L) 200 - 400 mg/dL Final        Pre-procedure vital signs at 9:30  T: 36.4 C, HR: 66, RR: 20, /74  Pulse oximetry: 99% on room air    Post-procedure vital signs at 11:25  T: 36.2 C, HR: 61, RR: 20, BP: 123/67    Pulse oximetry: 98% on room air     Outcome: TPE completed.     At 10:55 she developed Hive to her right back and itching under left arm and sternum. Procedure paused  and she received 25 mg of IV Benadryl. Her vital signs were all stable. Her symptoms  relieved and the procedure re-started at 11:04 and completed with no further complications.      Assessment and Plan:  69 y.o. female with Myasthenia Gravis who underwent TPE #5.  Draw post-procedure labs.  Vascular access to be managed by clinical team.  The series of TPE have been completed. No further TPE procedures are scheduled at this time.

## 2024-06-18 LAB — SCAN RESULT: NORMAL

## 2024-06-19 LAB
DIAGNOSIS-BB-PR33: NORMAL
PATH REVIEW-TRANSFUSION REACTION: NORMAL
RESIDENT REVIEW-BB: NORMAL
TYPE OF REACTION: NORMAL

## 2024-07-23 ENCOUNTER — OFFICE VISIT (OUTPATIENT)
Dept: HEMATOLOGY/ONCOLOGY | Facility: HOSPITAL | Age: 69
End: 2024-07-23
Payer: COMMERCIAL

## 2024-07-23 ENCOUNTER — LAB (OUTPATIENT)
Dept: LAB | Facility: HOSPITAL | Age: 69
End: 2024-07-23
Payer: COMMERCIAL

## 2024-07-23 VITALS
BODY MASS INDEX: 24.14 KG/M2 | SYSTOLIC BLOOD PRESSURE: 131 MMHG | DIASTOLIC BLOOD PRESSURE: 66 MMHG | TEMPERATURE: 97.3 F | OXYGEN SATURATION: 100 % | HEART RATE: 100 BPM | RESPIRATION RATE: 18 BRPM | WEIGHT: 131.17 LBS | HEIGHT: 62 IN

## 2024-07-23 DIAGNOSIS — D64.9 ANEMIA, UNSPECIFIED TYPE: Primary | ICD-10-CM

## 2024-07-23 DIAGNOSIS — D64.9 ANEMIA, UNSPECIFIED TYPE: ICD-10-CM

## 2024-07-23 DIAGNOSIS — R53.83 OTHER FATIGUE: ICD-10-CM

## 2024-07-23 LAB
ALBUMIN SERPL BCP-MCNC: 3.8 G/DL (ref 3.4–5)
ALP SERPL-CCNC: 71 U/L (ref 33–136)
ALT SERPL W P-5'-P-CCNC: 15 U/L (ref 7–45)
ANION GAP SERPL CALC-SCNC: 10 MMOL/L (ref 10–20)
AST SERPL W P-5'-P-CCNC: 23 U/L (ref 9–39)
BASOPHILS # BLD AUTO: 0.06 X10*3/UL (ref 0–0.1)
BASOPHILS NFR BLD AUTO: 0.6 %
BILIRUB SERPL-MCNC: 0.5 MG/DL (ref 0–1.2)
BUN SERPL-MCNC: 19 MG/DL (ref 6–23)
CALCIUM SERPL-MCNC: 9 MG/DL (ref 8.6–10.3)
CHLORIDE SERPL-SCNC: 102 MMOL/L (ref 98–107)
CO2 SERPL-SCNC: 28 MMOL/L (ref 21–32)
CREAT SERPL-MCNC: 0.78 MG/DL (ref 0.5–1.05)
CRP SERPL-MCNC: 0.32 MG/DL
DAT-POLYSPECIFIC: NORMAL
EGFRCR SERPLBLD CKD-EPI 2021: 81 ML/MIN/1.73M*2
EOSINOPHIL # BLD AUTO: 0.04 X10*3/UL (ref 0–0.4)
EOSINOPHIL NFR BLD AUTO: 0.4 %
ERYTHROCYTE [DISTWIDTH] IN BLOOD BY AUTOMATED COUNT: 17.7 % (ref 11.5–14.5)
ERYTHROCYTE [SEDIMENTATION RATE] IN BLOOD BY WESTERGREN METHOD: 24 MM/H (ref 0–30)
FERRITIN SERPL-MCNC: 22 NG/ML (ref 8–150)
FOLATE SERPL-MCNC: 11.6 NG/ML
GLUCOSE SERPL-MCNC: 85 MG/DL (ref 74–99)
HCT VFR BLD AUTO: 34.1 % (ref 36–46)
HGB BLD-MCNC: 10.6 G/DL (ref 12–16)
HGB RETIC QN: 28 PG (ref 28–38)
IGA SERPL-MCNC: 72 MG/DL (ref 70–400)
IGG SERPL-MCNC: 2630 MG/DL (ref 700–1600)
IGM SERPL-MCNC: 65 MG/DL (ref 40–230)
IMM GRANULOCYTES # BLD AUTO: 0.03 X10*3/UL (ref 0–0.5)
IMM GRANULOCYTES NFR BLD AUTO: 0.3 % (ref 0–0.9)
IMMATURE RETIC FRACTION: 14.8 %
IRON SATN MFR SERPL: 5 % (ref 25–45)
IRON SERPL-MCNC: 21 UG/DL (ref 35–150)
KAPPA LC SERPL-MCNC: 1.21 MG/DL (ref 0.33–1.94)
KAPPA LC/LAMBDA SER: 0.98 {RATIO} (ref 0.26–1.65)
LAMBDA LC SERPL-MCNC: 1.24 MG/DL (ref 0.57–2.63)
LDH SERPL L TO P-CCNC: 157 U/L (ref 84–246)
LYMPHOCYTES # BLD AUTO: 1.31 X10*3/UL (ref 0.8–3)
LYMPHOCYTES NFR BLD AUTO: 13.6 %
MCH RBC QN AUTO: 27.5 PG (ref 26–34)
MCHC RBC AUTO-ENTMCNC: 31.1 G/DL (ref 32–36)
MCV RBC AUTO: 89 FL (ref 80–100)
MONOCYTES # BLD AUTO: 0.62 X10*3/UL (ref 0.05–0.8)
MONOCYTES NFR BLD AUTO: 6.4 %
NEUTROPHILS # BLD AUTO: 7.58 X10*3/UL (ref 1.6–5.5)
NEUTROPHILS NFR BLD AUTO: 78.7 %
NRBC BLD-RTO: 0 /100 WBCS (ref 0–0)
PLATELET # BLD AUTO: 330 X10*3/UL (ref 150–450)
POTASSIUM SERPL-SCNC: 3.2 MMOL/L (ref 3.5–5.3)
PROT SERPL-MCNC: 7.9 G/DL (ref 6.4–8.2)
PROT SERPL-MCNC: 8.3 G/DL (ref 6.4–8.2)
RBC # BLD AUTO: 3.85 X10*6/UL (ref 4–5.2)
RETICS #: 0.04 X10*6/UL (ref 0.02–0.11)
RETICS/RBC NFR AUTO: 1.1 % (ref 0.5–2)
RHEUMATOID FACT SER NEPH-ACNC: <10 IU/ML (ref 0–15)
SODIUM SERPL-SCNC: 137 MMOL/L (ref 136–145)
TIBC SERPL-MCNC: 395 UG/DL (ref 240–445)
TSH SERPL-ACNC: 0.76 MIU/L (ref 0.44–3.98)
UIBC SERPL-MCNC: 374 UG/DL (ref 110–370)
VIT B12 SERPL-MCNC: 717 PG/ML (ref 211–911)
WBC # BLD AUTO: 9.6 X10*3/UL (ref 4.4–11.3)

## 2024-07-23 PROCEDURE — 82607 VITAMIN B-12: CPT

## 2024-07-23 PROCEDURE — 85652 RBC SED RATE AUTOMATED: CPT

## 2024-07-23 PROCEDURE — 80053 COMPREHEN METABOLIC PANEL: CPT

## 2024-07-23 PROCEDURE — 85045 AUTOMATED RETICULOCYTE COUNT: CPT

## 2024-07-23 PROCEDURE — 83521 IG LIGHT CHAINS FREE EACH: CPT

## 2024-07-23 PROCEDURE — 84443 ASSAY THYROID STIM HORMONE: CPT

## 2024-07-23 PROCEDURE — 86235 NUCLEAR ANTIGEN ANTIBODY: CPT

## 2024-07-23 PROCEDURE — 82728 ASSAY OF FERRITIN: CPT

## 2024-07-23 PROCEDURE — 1126F AMNT PAIN NOTED NONE PRSNT: CPT | Performed by: PHYSICIAN ASSISTANT

## 2024-07-23 PROCEDURE — 86431 RHEUMATOID FACTOR QUANT: CPT

## 2024-07-23 PROCEDURE — 3008F BODY MASS INDEX DOCD: CPT | Performed by: PHYSICIAN ASSISTANT

## 2024-07-23 PROCEDURE — 85025 COMPLETE CBC W/AUTO DIFF WBC: CPT

## 2024-07-23 PROCEDURE — 84155 ASSAY OF PROTEIN SERUM: CPT | Mod: 59

## 2024-07-23 PROCEDURE — 86140 C-REACTIVE PROTEIN: CPT

## 2024-07-23 PROCEDURE — 83010 ASSAY OF HAPTOGLOBIN QUANT: CPT

## 2024-07-23 PROCEDURE — 83540 ASSAY OF IRON: CPT

## 2024-07-23 PROCEDURE — 82746 ASSAY OF FOLIC ACID SERUM: CPT

## 2024-07-23 PROCEDURE — 82668 ASSAY OF ERYTHROPOIETIN: CPT

## 2024-07-23 PROCEDURE — 83615 LACTATE (LD) (LDH) ENZYME: CPT

## 2024-07-23 PROCEDURE — 1159F MED LIST DOCD IN RCRD: CPT | Performed by: PHYSICIAN ASSISTANT

## 2024-07-23 PROCEDURE — 82784 ASSAY IGA/IGD/IGG/IGM EACH: CPT

## 2024-07-23 PROCEDURE — 99214 OFFICE O/P EST MOD 30 MIN: CPT | Performed by: PHYSICIAN ASSISTANT

## 2024-07-23 PROCEDURE — 84165 PROTEIN E-PHORESIS SERUM: CPT

## 2024-07-23 PROCEDURE — 86880 COOMBS TEST DIRECT: CPT

## 2024-07-23 PROCEDURE — 1160F RVW MEDS BY RX/DR IN RCRD: CPT | Performed by: PHYSICIAN ASSISTANT

## 2024-07-23 PROCEDURE — 1036F TOBACCO NON-USER: CPT | Performed by: PHYSICIAN ASSISTANT

## 2024-07-23 PROCEDURE — 86038 ANTINUCLEAR ANTIBODIES: CPT

## 2024-07-23 PROCEDURE — 99204 OFFICE O/P NEW MOD 45 MIN: CPT | Performed by: PHYSICIAN ASSISTANT

## 2024-07-23 RX ORDER — METHOTREXATE 2.5 MG/1
2.5 TABLET ORAL
COMMUNITY

## 2024-07-23 ASSESSMENT — PAIN SCALES - GENERAL: PAINLEVEL: 0-NO PAIN

## 2024-07-23 NOTE — PROGRESS NOTES
Patient ID: Page Huston is a 69 y.o. female.  Referring Physician: Elsy Shipley MD  [id number]  [place] [address]  Primary Care Provider: Annamarie Dickinson MD  Visit Type: Initial Visit    Location: [facility] - Main  Diagnosis/Reason: Anemia    HPI:  Page Huston is a 69 y.o. female referred for consultation of anemia    NOTE:  7/23/24 - Patient reports she has a medical history of anorexia that has been ongoing for at least the past 50 years  Patient also has medical history significant for myasthenia gravis which is being treated w/ plasmapheresis and IVIG    Per review of records:  WBC counts WNL historically - Differentials predominantly WNL historically  Normocytic, predominantly hypochromic anemia since at least 2/28/19 with Hb range of 8.3 to 12.7 since then - RBC counts & Hct's low - RDW's elevated  Platelets predominantly WNL historically    Previous Hematological Background:  Hx of hematological disorders: Yes - Patient reports a hematologic history of anemia  Hx of blood transfusions: No - Patient denies prior blood transfusion  Hx of iron supplementation: Yes - IV and Oral supplementation - C/O constipation, dark stools, nausea, abdominal cramping - Agents: FeSO4  Hx of B12 supplementation: No - Denies any prior supplementation  Hx of folate supplementation: No - Denies any prior supplementation    Today she c/o gingival bleeding that has been ongoing for the past year, intermittent night sweats, intermittent altered taste, intermittent palpitations (has been evaluated by cardiology and diagnosed w/ mitral valve regurgitation), SOB w/ exertion, fatigue, PICA for sweet foods     Patient denies weight loss, abnormal bruising and bleeding, hematuria, blood in stool, dark/black stools, epistaxis, lymphadenopathy, recurrent infections, recurrent fevers, night sweats, early satiety, abdominal pain, bone pain, chest pain, dizziness, lightheadedness.    PMHx:  Active Ambulatory  Problems     Diagnosis Date Noted    Acid reflux 10/15/2023    Anxiety 10/15/2023    Double vision 10/15/2023    Aneurysm (CMS-HCC) 10/15/2023    Fatigue 10/15/2023    Insomnia 10/15/2023    Muscle weakness 10/15/2023    Myasthenia gravis with exacerbation, generalized (Multi) 10/15/2023    Myasthenia (Multi) 10/15/2023    Muscle cramps 10/15/2023    Stroke (Multi) 10/15/2023    Chest pain, unspecified type 2024     Resolved Ambulatory Problems     Diagnosis Date Noted    No Resolved Ambulatory Problems     No Additional Past Medical History       PSHx:  Past Surgical History:   Procedure Laterality Date    CT HEAD ANGIO W AND WO IV CONTRAST  3/17/2023    CT HEAD ANGIO W AND WO IV CONTRAST POR [id number] CT    HYSTERECTOMY  2017    Hysterectomy    IR CVC TUNNELED  2020    IR CVC TUNNELED 2020 [facility] CLINICAL LEGACY    MR HEAD ANGIO WO IV CONTRAST  2023    MR HEAD ANGIO WO IV CONTRAST POR CALLIE MR MOBILE    MR NECK ANGIO WO IV CONTRAST  2023    MR NECK ANGIO WO IV CONTRAST POR CALLIE MR MOBILE      [place] teeth extractions    FHx:  Family History   Problem Relation Name Age of Onset    Cancer Mother      Cancer Father      Cancer Sister      Cancer Brother        Mother: Stomach CA,   Father: Throat & Lung CA  Siblings: Brother - Lung CA, Sister - Lung CA  Children: 2 - 1 daughter w/ total hip replacement  Nephew: Systemic Mastocytosis  Miscarriages: Denies    Social Hx:  Page Huston    reports that she has never smoked. She has never used smokeless tobacco.  She  reports current alcohol use.  She  reports no history of drug use.  Social History     Socioeconomic History    Marital status:    Tobacco Use    Smoking status: Never    Smokeless tobacco: Never   Substance and Sexual Activity    Alcohol use: Yes     Comment: socially    Drug use: Never    Sexual activity: Defer     Social Determinants of Health     Financial Resource Strain: Low Risk  (2024)    Overall Financial  "Resource Strain (CARDIA)     Difficulty of Paying Living Expenses: Not hard at all   Transportation Needs: No Transportation Needs (6/6/2024)    PRAPARE - Transportation     Lack of Transportation (Medical): No     Lack of Transportation (Non-Medical): No   Housing Stability: Low Risk  (6/6/2024)    Housing Stability Vital Sign     Unable to Pay for Housing in the Last Year: No     Number of Places Lived in the Last Year: 1     Unstable Housing in the Last Year: No      Living Situation: Lives at home alone  Occupation: Part-time in retail  Marital Status:   Alcohol Use: Occasionally  Smoking: Never smoker  Recreational Drug Use: Denies  Special Diets: Patient reports she has been anorectic at 24 y/o    Cancer Screenings:  Upper EGD: Denies  Colonoscopy: Denies   Mammogram: 9/8/22  PAP smear: 6/14/12  Lung cancer screenings: None in EMR    Medications and allergies reviewed in EMR.    ROS:  Review of Systems - Oncology   10 point review of systems negative except as state in HPI.    Vitals & Statistics:  Objective   BSA: 1.61 meters squared  /66   Pulse 100   Temp 36.3 °C (97.3 °F) (Core)   Resp 18   Ht (S) 1.565 m (5' 1.61\")   Wt 59.5 kg (131 lb 2.8 oz)   SpO2 100%   BMI 24.29 kg/m²     Physical Exam:  Physical Exam      Results:  Lab Results   Component Value Date    WBC 14.1 (H) 06/13/2024    NEUTROABS 3.82 06/13/2024    IGABSOL 0.05 06/13/2024    LYMPHSABS 3.17 (H) 06/13/2024    MONOSABS 0.87 (H) 06/13/2024    EOSABS 0.24 06/13/2024    BASOSABS 0.08 06/13/2024    RBC 3.48 (L) 06/13/2024    MCV 89 06/13/2024    MCHC 30.7 (L) 06/13/2024    HGB 9.5 (L) 06/13/2024    HCT 30.9 (L) 06/13/2024     06/13/2024     Lab Results   Component Value Date    RETICCTPCT 2.6 (H) 05/12/2020      Lab Results   Component Value Date    CREATININE 0.77 06/13/2024    BUN 21 06/13/2024    EGFR 83 06/13/2024     06/13/2024    K 3.8 06/13/2024     06/13/2024    CO2 27 06/13/2024      Lab Results " "  Component Value Date    ALT 17 2024    AST 27 2024    ALKPHOS 72 2024    BILITOT 0.7 2024      Lab Results   Component Value Date    TSH 1.35 2024     Lab Results   Component Value Date    TSH 1.35 2024     Lab Results   Component Value Date    IRON 36 2024    TIBC 445 2024    FERRITIN 17 2024      Lab Results   Component Value Date    AIBZDEBK02 291 2023      Lab Results   Component Value Date    FOLATE 9.6 2020     No results found for: \"NAINA\", \"RF\", \"SEDRATE\"   No results found for: \"CRP\"   No results found for: \"JODY\"  Lab Results   Component Value Date     2020     Lab Results   Component Value Date    HAPTOGLOBIN 63 2020     No results found for: \"SPEP\"  Lab Results   Component Value Date     2018     No results found for: \"HEPATOT\", \"HEPAIGM\", \"HEPBCIGM\", \"HEPBCAB\", \"HEPBSAG\", \"HEPCAB\"  No results found for: \"HIV1X2\"    Assessment:  Page Huston is a 69 y.o. female referred for consultation of anemia    I reviewed patient's chart including but not limited to labs, imaging, surgical/procedure notes, pathology, hospital notes, doctor's notes.    Relevant historical labs/imagin24  Iron studies: Ferritin low at 17 - Iron WNL at 36 - TIBC WNL - %Sat low at 8%    24 results:  WBC count WNL - Isolated neutrophilia at 7.58   NC/HC anemia w/ Hb improved to 10.5 - RBC count low, Hct low, RDW elevated  Platelets WNL  Remaining results pending at time of writing    Plan:  Discussed possible etiologies including multifactorial, nutritional deficiencies, anemia of chronic disease, malabsorption, hemopathy, medications, bleeding, malignancy, etc. Will start hematological workup today.    Anemia  Lab results pending - Will review when available and address adverse results as needed  RTC in 3 weeks via in-person visit - F/U sooner if needed/urgent    I had an extensive discussion with the patient regarding the " diagnosis and discussed the plan of therapy, including general considerations regarding side effects and outcomes. Pt understood and gave appropriate teach back about the plan of care. All questions were answered to the patient's satisfaction. The patient is instructed to contact us at any time if questions or problems arise. Thank you for the opportunity to participate in the care of this very pleasant patient.    Total time = 80 minutes. 50% or more of this time was spent in counseling and/or coordination of care including reviewing medical history/radiology/labs, examining patient, formulating outlined plan with team, and discussing plan with patient/family.    Phil Felix PA-C

## 2024-07-24 LAB
ALBUMIN: 3.7 G/DL (ref 3.4–5)
ALPHA 1 GLOBULIN: 0.3 G/DL (ref 0.2–0.6)
ALPHA 2 GLOBULIN: 0.7 G/DL (ref 0.4–1.1)
BETA GLOBULIN: 0.7 G/DL (ref 0.5–1.2)
EPO SERPL-ACNC: 51 MU/ML (ref 4–27)
GAMMA GLOBULIN: 2.5 G/DL (ref 0.5–1.4)
HAPTOGLOB SERPL-MCNC: 107 MG/DL (ref 37–246)
PATH REVIEW-SERUM PROTEIN ELECTROPHORESIS: ABNORMAL
PROTEIN ELECTROPHORESIS COMMENT: ABNORMAL

## 2024-07-25 LAB
ANA PATTERN: ABNORMAL
ANA SER QL HEP2 SUBST: POSITIVE
ANA TITR SER IF: ABNORMAL {TITER}
CENTROMERE B AB SER-ACNC: 0.2 AI
CHROMATIN AB SERPL-ACNC: 0.3 AI
DSDNA AB SER-ACNC: 10 IU/ML
ENA JO1 AB SER QL IA: <0.2 AI
ENA RNP AB SER IA-ACNC: 0.7 AI
ENA SCL70 AB SER QL IA: <0.2 AI
ENA SM AB SER IA-ACNC: <0.2 AI
ENA SM+RNP AB SER QL IA: 0.3 AI
ENA SS-A AB SER IA-ACNC: 2.3 AI
ENA SS-B AB SER IA-ACNC: <0.2 AI
RIBOSOMAL P AB SER-ACNC: <0.2 AI

## 2024-07-31 ENCOUNTER — SPECIALTY PHARMACY (OUTPATIENT)
Dept: NEUROLOGY | Facility: HOSPITAL | Age: 69
End: 2024-07-31
Payer: COMMERCIAL

## 2024-08-03 DIAGNOSIS — G70.00 MYASTHENIA GRAVIS (MULTI): ICD-10-CM

## 2024-08-05 RX ORDER — PYRIDOSTIGMINE BROMIDE 60 MG/1
60 TABLET ORAL 4 TIMES DAILY
Qty: 360 TABLET | Refills: 3 | Status: SHIPPED | OUTPATIENT
Start: 2024-08-05

## 2024-08-13 ENCOUNTER — OFFICE VISIT (OUTPATIENT)
Dept: HEMATOLOGY/ONCOLOGY | Facility: HOSPITAL | Age: 69
End: 2024-08-13
Payer: COMMERCIAL

## 2024-08-13 VITALS
WEIGHT: 133.6 LBS | SYSTOLIC BLOOD PRESSURE: 138 MMHG | DIASTOLIC BLOOD PRESSURE: 65 MMHG | HEART RATE: 70 BPM | OXYGEN SATURATION: 100 % | RESPIRATION RATE: 18 BRPM | BODY MASS INDEX: 24.74 KG/M2 | TEMPERATURE: 96.8 F

## 2024-08-13 DIAGNOSIS — D50.8 IRON DEFICIENCY ANEMIA SECONDARY TO INADEQUATE DIETARY IRON INTAKE: ICD-10-CM

## 2024-08-13 DIAGNOSIS — K90.9 MALABSORPTION OF IRON (HHS-HCC): Primary | ICD-10-CM

## 2024-08-13 PROCEDURE — 1126F AMNT PAIN NOTED NONE PRSNT: CPT | Performed by: PHYSICIAN ASSISTANT

## 2024-08-13 PROCEDURE — 1159F MED LIST DOCD IN RCRD: CPT | Performed by: PHYSICIAN ASSISTANT

## 2024-08-13 PROCEDURE — 99214 OFFICE O/P EST MOD 30 MIN: CPT | Performed by: PHYSICIAN ASSISTANT

## 2024-08-13 PROCEDURE — 1160F RVW MEDS BY RX/DR IN RCRD: CPT | Performed by: PHYSICIAN ASSISTANT

## 2024-08-13 RX ORDER — HEPARIN SODIUM,PORCINE/PF 10 UNIT/ML
50 SYRINGE (ML) INTRAVENOUS AS NEEDED
OUTPATIENT
Start: 2024-08-22

## 2024-08-13 RX ORDER — FAMOTIDINE 10 MG/ML
20 INJECTION INTRAVENOUS ONCE AS NEEDED
OUTPATIENT
Start: 2024-08-22

## 2024-08-13 RX ORDER — DIPHENHYDRAMINE HYDROCHLORIDE 50 MG/ML
50 INJECTION INTRAMUSCULAR; INTRAVENOUS AS NEEDED
OUTPATIENT
Start: 2024-08-22

## 2024-08-13 RX ORDER — HEPARIN 100 UNIT/ML
500 SYRINGE INTRAVENOUS AS NEEDED
OUTPATIENT
Start: 2024-08-22

## 2024-08-13 RX ORDER — FAMOTIDINE 10 MG/ML
20 INJECTION INTRAVENOUS ONCE
OUTPATIENT
Start: 2024-08-22

## 2024-08-13 RX ORDER — EPINEPHRINE 0.3 MG/.3ML
0.3 INJECTION SUBCUTANEOUS EVERY 5 MIN PRN
OUTPATIENT
Start: 2024-08-22

## 2024-08-13 RX ORDER — ALBUTEROL SULFATE 0.83 MG/ML
3 SOLUTION RESPIRATORY (INHALATION) AS NEEDED
OUTPATIENT
Start: 2024-08-22

## 2024-08-13 ASSESSMENT — PAIN SCALES - GENERAL: PAINLEVEL: 0-NO PAIN

## 2024-08-13 NOTE — PROGRESS NOTES
Patient ID: Page Huston is a 69 y.o. female.  Referring Physician: No referring provider defined for this encounter.  Primary Care Provider: Annamarie Dickinson MD  Visit Type: Follow Up    Location: [facility] - Main  Diagnosis/Reason: Multifactorial Anemia - Iron Deficiency (Likely 2/2 Poor Dietary Intake/Hx Anorexia) and Underlying Inflammation (AoCD)    Interval Hx:  8/13/24  Page Huston is a 69 y.o. female following up today for multifactorial anemia d/t iron deficiency (likely 2/2 poor dietary intake/hx of anorexia) and underlying inflammation (AoCD)     NOTE:  7/23/24 - Patient reports she has a medical history of anorexia that has been ongoing for at least the past 50 years  Patient also has medical history significant for myasthenia gravis which is being treated w/ plasmapheresis and IVIG    Patient denies weight loss, abnormal bruising and bleeding, hematuria, blood in stool, dark/black stools, epistaxis, oral/gingival bleeding, lymphadenopathy, recurrent infections, recurrent fevers, night sweats, early satiety, abdominal pain, bone pain, chest pain, palpitations, SOB, POWERS, fatigue, dizziness, lightheadedness, PICA.    Relevant Hx:  7/23/24 - Initial Visit  Page Huston is a 69 y.o. female referred for consultation of anemia    Per review of records:  WBC counts WNL historically - Differentials predominantly WNL historically  Normocytic, predominantly hypochromic anemia since at least 2/28/19 with Hb range of 8.3 to 12.7 since then - RBC counts & Hct's low - RDW's elevated  Platelets predominantly WNL historically    Previous Hematological Background:  Hx of hematological disorders: Yes - Patient reports a hematologic history of anemia  Hx of blood transfusions: No - Patient denies prior blood transfusion  Hx of iron supplementation: Yes - IV and Oral supplementation - C/O constipation, dark stools, nausea, abdominal cramping - Agents: FeSO4  Hx of B12 supplementation: No - Denies any prior supplementation  Hx  of folate supplementation: No - Denies any prior supplementation    Today she c/o gingival bleeding that has been ongoing for the past year, intermittent night sweats, intermittent altered taste, intermittent palpitations (has been evaluated by cardiology and diagnosed w/ mitral valve regurgitation), SOB w/ exertion, fatigue, PICA for sweet foods     Patient denies weight loss, abnormal bruising and bleeding, hematuria, blood in stool, dark/black stools, epistaxis, lymphadenopathy, recurrent infections, recurrent fevers, night sweats, early satiety, abdominal pain, bone pain, chest pain, dizziness, lightheadedness.    PMHx:  Active Ambulatory Problems     Diagnosis Date Noted    Acid reflux 10/15/2023    Anxiety 10/15/2023    Double vision 10/15/2023    Aneurysm (CMS-HCC) 10/15/2023    Fatigue 10/15/2023    Insomnia 10/15/2023    Muscle weakness 10/15/2023    Myasthenia gravis with exacerbation, generalized (Multi) 10/15/2023    Myasthenia (Multi) 10/15/2023    Muscle cramps 10/15/2023    Stroke (Multi) 10/15/2023    Chest pain, unspecified type 2024     Resolved Ambulatory Problems     Diagnosis Date Noted    No Resolved Ambulatory Problems     No Additional Past Medical History       PSHx:  Past Surgical History:   Procedure Laterality Date    CT HEAD ANGIO W AND WO IV CONTRAST  3/17/2023    CT HEAD ANGIO W AND WO IV CONTRAST POR [id number] CT    HYSTERECTOMY  2017    Hysterectomy    IR CVC TUNNELED  2020    IR CVC TUNNELED 2020 [facility]GAC[facility]    MR HEAD ANGIO WO IV CONTRAST  2023    MR HEAD ANGIO WO IV CONTRAST POR CALLIE MR MOBILE    MR NECK ANGIO WO IV CONTRAST  2023    MR NECK ANGIO WO IV CONTRAST POR CALLIE MR MOBILE      [place] teeth extractions    FHx:  Family History   Problem Relation Name Age of Onset    Cancer Mother      Cancer Father      Cancer Sister      Cancer Brother        Mother: Stomach CA,   Father: Throat & Lung CA  Siblings: Brother - Lung CA, Sister -  Lung CA  Children: 2 - 1 daughter w/ total hip replacement  Nephew: Systemic Mastocytosis  Miscarriages: Denies    Social Hx:  Page Huston    reports that she has never smoked. She has never used smokeless tobacco.  She  reports current alcohol use.  She  reports no history of drug use.  Social History     Socioeconomic History    Marital status:    Tobacco Use    Smoking status: Never    Smokeless tobacco: Never   Substance and Sexual Activity    Alcohol use: Yes     Comment: socially    Drug use: Never    Sexual activity: Defer     Social Determinants of Health     Financial Resource Strain: Low Risk  (6/6/2024)    Overall Financial Resource Strain (CARDIA)     Difficulty of Paying Living Expenses: Not hard at all   Transportation Needs: No Transportation Needs (6/6/2024)    PRAPARE - Transportation     Lack of Transportation (Medical): No     Lack of Transportation (Non-Medical): No   Housing Stability: Low Risk  (6/6/2024)    Housing Stability Vital Sign     Unable to Pay for Housing in the Last Year: No     Number of Places Lived in the Last Year: 1     Unstable Housing in the Last Year: No      Living Situation: Lives at home alone  Occupation: Part-time in retail  Marital Status:   Alcohol Use: Occasionally  Smoking: Never smoker  Recreational Drug Use: Denies  Special Diets: Patient reports she has been anorectic at 22 y/o    Cancer Screenings:  Upper EGD: Denies  Colonoscopy: Denies   Mammogram: 9/8/22  PAP smear: 6/14/12  Lung cancer screenings: None in EMR    Medications and allergies reviewed in EMR.    ROS:  Review of Systems - Oncology   10 point review of systems negative except as state in HPI.    Vitals & Statistics:  Objective   BSA: There is no height or weight on file to calculate BSA.  There were no vitals taken for this visit.    Physical Exam:  Physical Exam  Vitals and nursing note reviewed.   Constitutional:       Appearance: Normal appearance. She is normal weight.   HENT:       Head: Normocephalic and atraumatic.      Right Ear: External ear normal.      Left Ear: External ear normal.      Nose: Nose normal.      Mouth/Throat:      Mouth: Mucous membranes are moist.      Pharynx: Oropharynx is clear.   Eyes:      Extraocular Movements: Extraocular movements intact.      Conjunctiva/sclera: Conjunctivae normal.      Pupils: Pupils are equal, round, and reactive to light.      Comments: Wearing corrective lenses   Cardiovascular:      Rate and Rhythm: Normal rate and regular rhythm.      Pulses: Normal pulses.      Heart sounds: Normal heart sounds.   Pulmonary:      Effort: Pulmonary effort is normal.      Breath sounds: Normal breath sounds.   Abdominal:      General: Abdomen is flat. Bowel sounds are normal.      Palpations: Abdomen is soft.      Comments: No masses or organomegaly palpable during exam   Musculoskeletal:         General: Normal range of motion.      Cervical back: Normal range of motion.   Lymphadenopathy:      Comments: No lymphadenopathy palpable   Skin:     General: Skin is warm and dry.      Capillary Refill: Capillary refill takes less than 2 seconds.   Neurological:      Mental Status: She is alert and oriented to person, place, and time. Mental status is at baseline.   Psychiatric:         Mood and Affect: Mood normal.         Behavior: Behavior normal.         Thought Content: Thought content normal.         Judgment: Judgment normal.           Results:  Lab Results   Component Value Date    WBC 9.6 07/23/2024    NEUTROABS 7.58 (H) 07/23/2024    IGABSOL 0.03 07/23/2024    LYMPHSABS 1.31 07/23/2024    MONOSABS 0.62 07/23/2024    EOSABS 0.04 07/23/2024    BASOSABS 0.06 07/23/2024    RBC 3.85 (L) 07/23/2024    MCV 89 07/23/2024    MCHC 31.1 (L) 07/23/2024    HGB 10.6 (L) 07/23/2024    HCT 34.1 (L) 07/23/2024     07/23/2024     Lab Results   Component Value Date    RETICCTPCT 1.1 07/23/2024      Lab Results   Component Value Date    CREATININE 0.78 07/23/2024  "   BUN 19 2024    EGFR 81 2024     2024    K 3.2 (L) 2024     2024    CO2 28 2024      Lab Results   Component Value Date    ALT 15 2024    AST 23 2024    ALKPHOS 71 2024    BILITOT 0.5 2024      Lab Results   Component Value Date    TSH 0.76 2024     Lab Results   Component Value Date    TSH 0.76 2024     Lab Results   Component Value Date    IRON 21 (L) 2024    TIBC 395 2024    FERRITIN 22 2024      Lab Results   Component Value Date    NDCTIFXU34 717 2024      Lab Results   Component Value Date    FOLATE 11.6 2024     Lab Results   Component Value Date    NAINA Positive (A) 2024    RF <10 2024    SEDRATE 24 2024      Lab Results   Component Value Date    CRP 0.32 2024      No results found for: \"JODY\"  Lab Results   Component Value Date     2024     Lab Results   Component Value Date    HAPTOGLOBIN 107 2024     Lab Results   Component Value Date    SPEP Increase in polyclonal gamma globulins.   2024     Lab Results   Component Value Date    IGG 2,630 (H) 2024    IGM 65 2024    IGA 72 2024     No results found for: \"HEPATOT\", \"HEPAIGM\", \"HEPBCIGM\", \"HEPBCAB\", \"HEPBSAG\", \"HEPCAB\"  No results found for: \"HIV1X2\"    Assessment:  Page Huston is a 69 y.o. female following up today for multifactorial anemia d/t iron deficiency (likely 2/2 poor dietary intake/hx of anorexia) and underlying inflammation (AoCD)     I reviewed patient's chart including but not limited to labs, imaging, surgical/procedure notes, pathology, hospital notes, doctor's notes.    Relevant historical labs/imagin24  Iron studies: Ferritin low at 17 - Iron WNL at 36 - TIBC WNL - %Sat low at 8%    24 results:  WBC count WNL - Isolated neutrophilia at 7.58   NC/HC anemia w/ Hb improved to 10.6 - RBC count low, Hct low, RDW elevated  Platelets WNL  Renal: Potassium " low at 3.2   Iron studies: Ferritin low for this practice at 22 - Iron low at 21 - TIBC WNL - %Sat low at 5% - Indicative of iron deficiency  NAINA: Positive w/ 1:160 homogeneous titer - +anti-SSA ab's at 2.3 and +anti-dsDNA ab's at 10.0 - Indicative of possible rheumatologic condition  SPEP: Increase in polyclonal gamma globulins - Indicative of underlying inflammation  Igg's: IgG elevated at 2630 - IgM and IgA WNL  FLC's: Kappa, lambda and ratio all WNL    Plan:  8/12/24 - Hematologic workup positive for neutrophilia at 7.58, NC/HC anemia w/ Hb improved to 10.6, iron deficiency and indications of possible rheumatologic condition as seen by +NAINA w/ +anti-SSA and +anti-dsDNA ab's. There's inflammation of underlying inflammation seen on SPEP w/ increased polyclonal gamma globulins. There is no evidence of paraprotein, hemolysis, liver disease, renal disease, thyroid disease.    Multifactorial Anemia - Iron Deficiency & Underlying Inflammation (Likely AoCD)  Continue FeSO4 325mg 1 tab PO QD  Referral to rheumatology for +ANA w/ 1:160 titer, +anti-SSA and +anti-dsDNA ab's  RTC in 3 months via in-person visit - F/U sooner if needed/urgent  CBC-D, CMP, Iron studies up to 1 week prior    I had an extensive discussion with the patient regarding the diagnosis and discussed the plan of therapy, including general considerations regarding side effects and outcomes. Pt understood and gave appropriate teach back about the plan of care. All questions were answered to the patient's satisfaction. The patient is instructed to contact us at any time if questions or problems arise. Thank you for the opportunity to participate in the care of this very pleasant patient.    Total time = 80 minutes. 50% or more of this time was spent in counseling and/or coordination of care including reviewing medical history/radiology/labs, examining patient, formulating outlined plan with team, and discussing plan with patient/family.    Phil Felix,  EBONIE

## 2024-08-19 ENCOUNTER — APPOINTMENT (OUTPATIENT)
Dept: RHEUMATOLOGY | Facility: CLINIC | Age: 69
End: 2024-08-19
Payer: COMMERCIAL

## 2024-08-19 VITALS
HEIGHT: 62 IN | HEART RATE: 62 BPM | DIASTOLIC BLOOD PRESSURE: 82 MMHG | SYSTOLIC BLOOD PRESSURE: 137 MMHG | BODY MASS INDEX: 24.29 KG/M2 | WEIGHT: 132 LBS | TEMPERATURE: 98.3 F

## 2024-08-19 DIAGNOSIS — M15.9 OSTEOARTHRITIS OF MULTIPLE JOINTS, UNSPECIFIED OSTEOARTHRITIS TYPE: Primary | ICD-10-CM

## 2024-08-19 DIAGNOSIS — D50.8 IRON DEFICIENCY ANEMIA SECONDARY TO INADEQUATE DIETARY IRON INTAKE: ICD-10-CM

## 2024-08-19 PROCEDURE — 1159F MED LIST DOCD IN RCRD: CPT | Performed by: STUDENT IN AN ORGANIZED HEALTH CARE EDUCATION/TRAINING PROGRAM

## 2024-08-19 PROCEDURE — 1036F TOBACCO NON-USER: CPT | Performed by: STUDENT IN AN ORGANIZED HEALTH CARE EDUCATION/TRAINING PROGRAM

## 2024-08-19 PROCEDURE — 1125F AMNT PAIN NOTED PAIN PRSNT: CPT | Performed by: STUDENT IN AN ORGANIZED HEALTH CARE EDUCATION/TRAINING PROGRAM

## 2024-08-19 PROCEDURE — 3008F BODY MASS INDEX DOCD: CPT | Performed by: STUDENT IN AN ORGANIZED HEALTH CARE EDUCATION/TRAINING PROGRAM

## 2024-08-19 PROCEDURE — 99204 OFFICE O/P NEW MOD 45 MIN: CPT | Performed by: STUDENT IN AN ORGANIZED HEALTH CARE EDUCATION/TRAINING PROGRAM

## 2024-08-19 RX ORDER — CHOLECALCIFEROL (VITAMIN D3) 25 MCG
1000 TABLET ORAL DAILY
COMMUNITY

## 2024-08-19 ASSESSMENT — PAIN SCALES - GENERAL: PAINLEVEL: 2

## 2024-08-19 NOTE — Clinical Note
Good morning! We saw Ms. Huston in the Rheum clinic today. She has mildly elevated NAINA titers, SSA antibodies and mildly elevated DsDNA which can be seen with increased age. She has also been receiving monthly IVIG infusions for her myasthenia gravis for the past four years, which could also be contributing to her mildly elevated autoimmune titers as well. There are no concerns for any autoimmune connective tissue disease at this time. Thank you!

## 2024-08-19 NOTE — PROGRESS NOTES
Rheumatology Clinic  [facility]  New Patient Clinic Visit    69 y.o.    CHIEF COMPLAINT: arthalgia    Patient ID: [id number]   Page Huston is a 69 y.o. female with a PMH of myasthenia gravis on methotrexate 20 mg and monthly plasmapharesis and IVIF, iron deficiency anemia who presents for Abnormal Lab    She reports a hx of iron deficiency anemia for the past four years for which she has been taking daily iron tablets. Reports intermittent oral gum bleeding but no other signs of bleeding such as hematuria,epistaxis, or hematochezia.     She recently followed with the heme/Onc clinic where she underwent extensive testing including low iron levels of 21 and borderline low ferritin 22.     Autoimmune work-up showed mildly elevated NAINA  Positive w/ 1:160 homogeneous titer - +anti-SSA ab's at 2.3 and +anti-dsDNA ab's at 10.0. IgG elevated at 2630 - IgM and IgA WNL. Kappa, lambda and ratio all WNL     Today she reports symptoms of small joints of the hands mostly in the MCP and PIP joints, back pain  and the hips for about 6 months, gets worse with use. She reports dryness of the mouth, no oral ulcers, no raynauds. No malar rash. No hx of recurrent blood clots or miscarriages. No uveitis.       Current rheum meds:  -methotrexate 20 mg weekly  -folic acid    Previous rheum meds:  -N/A    Patient Active Problem List   Diagnosis    Acid reflux    Anxiety    Double vision    Aneurysm (CMS-HCC)    Fatigue    Insomnia    Muscle weakness    Myasthenia gravis with exacerbation, generalized (Multi)    Myasthenia (Multi)    Muscle cramps    Stroke (Multi)    Chest pain, unspecified type    Iron deficiency anemia secondary to inadequate dietary iron intake    Malabsorption of iron ([facility]-HCC)         No past medical history on file.         Past Surgical History:   Procedure Laterality Date    CT HEAD ANGIO W AND WO IV CONTRAST  3/17/2023    CT HEAD ANGIO W AND WO IV CONTRAST POR [id number] CT    HYSTERECTOMY  07/26/2017     Hysterectomy    IR CVC TUNNELED  5/5/2020    IR CVC TUNNELED 5/5/2020 [facility]ICAL [facility]GAC[facility]    MR HEAD ANGIO WO IV CONTRAST  2/16/2023    MR HEAD ANGIO WO IV CONTRAST POR CALLIE CALVIN    MR NECK ANGIO WO IV CONTRAST  2/16/2023    MR NECK ANGIO WO IV CONTRAST POR CALLIE CALVIN       No Known Allergies    Medication Documentation Review Audit       Reviewed by Acacia Pascual RN (Registered Nurse) on 08/19/24 at 0836      Medication Order Taking? Sig Documenting Provider Last Dose Status     Discontinued 08/19/24 0834   aspirin 81 mg EC tablet [id number] Yes Take 1 tablet (81 mg) by mouth once daily. Briseida Blue MD Taking Active   cholecalciferol (Vitamin D3) 25 MCG (1000 UT) tablet [id number] Yes Take 1 tablet (1,000 Units) by mouth once daily. Historical Provider, MD Taking Active   cyanocobalamin (Vitamin B-12) 500 mcg tablet [id number] Yes Take 1 tablet (500 mcg) by mouth once daily. Historical Provider, MD Taking Active   DULoxetine (Cymbalta) 60 mg DR capsule [id number] Yes Take 1 capsule (60 mg) by mouth once daily. Historical Provider, MD Taking Active   ferrous sulfate, 325 mg ferrous sulfate, tablet [id number] Yes Take 1 tablet by mouth once daily with breakfast. Briseida Blue MD Taking Active   folic acid (Folvite) 1 mg tablet [id number] Yes Take 1 tablet (1 mg) by mouth once daily. Elsy Shipley MD Taking Active   gabapentin (Neurontin) 100 mg capsule [id number] Yes Take 3 capsules (300 mg) by mouth once daily at bedtime. Hakeem Payan,  Taking Active   methotrexate (Trexall) 2.5 mg tablet [id number] Yes Take 1 tablet (2.5 mg total) by mouth 1 (one) time per week.  Follow directions carefully, and ask to explain any part you do not understand. Take exactly as directed 8 tablets once a week Historical Provider, MD Taking Active   pantoprazole (ProtoNix) 40 mg EC tablet [id number] Yes Take 1 tablet (40 mg) by mouth once daily. Historical Provider, MD Taking Active   predniSONE (Deltasone) 10 mg tablet  [id number] Yes Take 1 tablet (10 mg) by mouth once daily. Historical Provider, MD Taking Active   pyridostigmine (Mestinon) 180 mg ER tablet [id number] Yes Take 1 tablet (180 mg) by mouth once daily at bedtime. Donta De Leon MD Taking Active   pyridostigmine (Mestinon) 60 mg tablet [id number] Yes Take 1 tablet (60 mg) by mouth 4 times a day. Elsy Shipley MD Taking Active   traZODone (Desyrel) 50 mg tablet [id number] Yes TAKE 1/2 TO 1 TABS AT BEDTIME Elsy Shipley MD Taking Active                        Family History   Problem Relation Name Age of Onset    Cancer Mother      Cancer Father      Cancer Sister      Cancer Brother            Social History     Tobacco Use    Smoking status: Never     Passive exposure: Never    Smokeless tobacco: Never   Substance Use Topics    Alcohol use: Yes     Comment: socially    Drug use: Never         Review of Systems    REVIEW OF SYSTEMS:  Constitutional: positive for fatigue  Skin:  No rash or psoriasis or photosensitvity  Head: No headache, oral ulcers or hair loss  Neck: No difficulty swallowing or choking  Eyes: No dry eyes or iritis  Mouth: positive for eye and mouth dryness  Pulmonary: No wheezing, pleurisy or SOB  Cardiovascular: No chest pain or palpitations  Gastrointestinal: No abdominal pain, nausea, heartburn, or blood in stool  Endocrine: No Raynaud's   Musculoskeletal: As H/P    All 10 review of systems negative      PHYSICAL EXAM:  Visit Vitals  /82   Pulse 62   Temp 36.8 °C (98.3 °F)     General - NAD, sitting up in chair, well-groomed, pleasant, AAOx3  Head: Normocephalic, atraumatic  Eyes - PERRLA, EOMI. No conjunctiva injection.   Mouth/ENT - Moist oral and nasal mucosa. No facial rash. No enlarged parotid or submandibular gland. Adequate salivary pooling.  Skin - No rashes or ulcers. Skin warm and dry. No erythema on bilateral cheeks.  Extremities - No edema, cyanosis ,or clubbing  Neurological - Alert and oriented x 3,  grossly intact. No focal  "deficit.  Musculoskeletal -  EXAMJOINTDETAILED,  Shoulders: Full ROM, without pain, no swelling, warmth or tenderness.  Elbows: Full ROM, without pain, no swelling, warmth or tenderness.  Wrists: Full ROM, without pain, no swelling, warmth or tenderness.  MCP: No swelling, warmth or tenderness. Metacarpal squeeze negative  PIP: No swelling, warmth or tenderness.  DIP: No swelling, warmth or tenderness.  Hands : 5/5.    Sacroiliac joints: No local tenderness. KASEY negative.   Hips: Full ROM.  No malalignment.  Knees:  Full ROM, without pain, no swelling, warmth or point tenderness. No joint line tenderness, no pes anserine tenderness.  Ankles: Full ROM, without pain, swelling, warmth or tenderness.  Toes: No swelling, warmth or tenderness. Metatarsal squeeze negative  Cervical spine: No tenderness or limitation of movement  Lumbar spine: mild lower lumbar tenderness on palpation, R>L     Lab Results   Component Value Date    WBC 9.6 07/23/2024    HGB 10.6 (L) 07/23/2024    HCT 34.1 (L) 07/23/2024    MCV 89 07/23/2024     07/23/2024        Chemistry    Lab Results   Component Value Date/Time     07/23/2024 0841    K 3.2 (L) 07/23/2024 0841     07/23/2024 0841    CO2 28 07/23/2024 0841    BUN 19 07/23/2024 0841    CREATININE 0.78 07/23/2024 0841    Lab Results   Component Value Date/Time    CALCIUM 9.0 07/23/2024 0841    ALKPHOS 71 07/23/2024 0841    AST 23 07/23/2024 0841    ALT 15 07/23/2024 0841    BILITOT 0.5 07/23/2024 0841           Lab Results   Component Value Date    CRP 0.32 07/23/2024      Lab Results   Component Value Date    NAINA Positive (A) 07/23/2024    RF <10 07/23/2024    SEDRATE 24 07/23/2024      No results found for: \"CKTOTAL\"  Lab Results   Component Value Date    NEUTROABS 7.58 (H) 07/23/2024      Lab Results   Component Value Date    FERRITIN 22 07/23/2024      No results found for: \"HEPATOT\", \"HEPAIGM\", \"HEPBCIGM\", \"HEPBCAB\", \"HBEAG\", \"HEPCAB\"   Lab Results   Component " "Value Date    ALT 15 07/23/2024    AST 23 07/23/2024    ALKPHOS 71 07/23/2024    BILITOT 0.5 07/23/2024      No results found for: \"PPD\"   No results found for: \"URICACID\"   Lab Results   Component Value Date    CALCIUM 9.0 07/23/2024    CAION 1.22 06/13/2024    PHOS 4.5 06/13/2024      Lab Results   Component Value Date    SPEP Increase in polyclonal gamma globulins.   07/23/2024      No results found for: \"ALBUR\", \"[id number]\"     Transthoracic Echo (TTE) Complete    Result Date: 6/8/2024   [facility], [address] [place] [address]                Tel [phone] and Fax [phone] TRANSTHORACIC ECHOCARDIOGRAM REPORT  Patient Name:      AUTUMNEVY Tracy Physician:    [id number] Lan Santos MD Study Date:        6/8/2024             Ordering Provider:    [id number] BHATIA MRN/PID:           [id number]             Fellow: Accession#:        [id number]         Nurse:                Alyce Bonilla RN Date of Birth/Age: 1955 / 69 years  Sonographer:          Ruthann Andrade                                                               [facility] Gender:            F                    Additional Staff: Height:            160.02 cm            Admit Date:           6/5/2024 Weight:            59.88 kg             Admission Status:     Inpatient -                                                               Priority discharge BSA / BMI:         1.62 m2 / 23.38      Encounter#:           [id number]                    kg/m2                                         Department Location:  [facility] Non                                                               Invasive Blood Pressure: 160 /94 mmHg Study Type:    TRANSTHORACIC ECHO (TTE) COMPLETE Diagnosis/ICD: Chest pain, unspecified-R07.9 Indication:    Exertional dyspnea CPT Code:      Echo Complete w Full Doppler-[id number] Patient History: Pertinent History: Aneurysm; Stroke; " Chest pain; Myasthenia Gravis; Anemia; POWERS;                    Abnormal EKG. Study Detail: The following Echo studies were performed: 2D, M-Mode, Doppler and               color flow. Agitated saline used as a contrast agent for               intraseptal flow evaluation.  PHYSICIAN INTERPRETATION: Left Ventricle: The left ventricular systolic function is normal, with an estimated ejection fraction of 70%. There are no regional wall motion abnormalities. The left ventricular cavity size is normal. Spectral Doppler shows a normal pattern of left ventricular diastolic filling. There are normal left ventricular filling pressures. There is anterobasal septal hypertrophy with a sigmoid septum. Left Atrium: The left atrium is upper limits of normal in size. A bubble study using agitated saline was very technically difficult and unable to be interpreted. Right Ventricle: The right ventricle is normal in size. There is normal right ventricular global systolic function. Right Atrium: The right atrium is normal in size. Aortic Valve: The aortic valve is trileaflet. There is mild aortic valve cusp calcification. There is mild aortic valve regurgitation. The peak instantaneous gradient of the aortic valve is 8.9 mmHg. The mean gradient of the aortic valve is 4.0 mmHg. Mitral Valve: The mitral valve is mildly thickened. There is mild mitral valve regurgitation. Tricuspid Valve: The tricuspid valve is structurally normal. There is mild tricuspid regurgitation. The Doppler estimated RVSP is within normal limits at 25.8 mmHg. Pulmonic Valve: The pulmonic valve is structurally normal. There is physiologic pulmonic valve regurgitation. Pericardium: There is a trivial pericardial effusion. Aorta: The aortic root is normal. The aortic root is at the upper limits of normal size. Systemic Veins: The inferior vena cava appears to be of normal size. There is IVC inspiratory collapse greater than 50%. In comparison to the previous  echocardiogram(s): There are no prior studies on this patient for comparison purposes.  CONCLUSIONS:  1. Left ventricular systolic function is normal with a 70% estimated ejection fraction.  2. There is anterobasal septal hypertrophy with a sigmoid septum.  3. RVSP within normal limits.  4. Mild aortic valve regurgitation. QUANTITATIVE DATA SUMMARY: 2D MEASUREMENTS:                          Normal Ranges: Ao Root d:     3.60 cm   (2.0-3.7cm) LAs:           3.70 cm   (2.7-4.0cm) IVSd:          0.90 cm   (0.6-1.1cm) LVPWd:         0.70 cm   (0.6-1.1cm) LVIDd:         4.20 cm   (3.9-5.9cm) LVIDs:         2.60 cm LV Mass Index: 62.5 g/m2 LV % FS        38.1 % LA VOLUME:                             Normal Ranges: LA Volume Index: 33.5 ml/m2 RA VOLUME BY A/L METHOD:                       Normal Ranges: RA Area A4C: 11.9 cm2 AORTA MEASUREMENTS:                    Normal Ranges: Asc Ao, d: 3.00 cm (2.1-3.4cm) LV SYSTOLIC FUNCTION BY 2D PLANIMETRY (MOD):                     Normal Ranges: EF-A4C View: 76.2 % (>=55%) EF-A2C View: 77.9 % EF-Biplane:  76.9 % LV DIASTOLIC FUNCTION:                               Normal Ranges: MV Peak E:        0.68 m/s    (0.7-1.2 m/s) MV Peak A:        0.70 m/s    (0.42-0.7 m/s) E/A Ratio:        0.98        (1.0-2.2) MV e'             0.08 m/s    (>8.0) MV lateral e'     0.09 m/s MV medial e'      0.07 m/s MV A Dur:         137.50 msec E/e' Ratio:       8.54        (<8.0) MV DT:            216 msec    (150-240 msec) PulmV Sys Lul:    43.20 cm/s PulmV Rivas Lul:   40.40 cm/s PulmV S/D Lul:    1.10 PulmV A Revs Lul: 24.30 cm/s PulmV A Revs Dur: 148.00 msec MITRAL VALVE:                 Normal Ranges: MV DT: 216 msec (150-240msec) MITRAL INSUFFICIENCY:                      Normal Ranges: MR VTI:  175.00 cm MR Vmax: 543.00 cm/s AORTIC VALVE:                                   Normal Ranges: AoV Vmax:                1.49 m/s (<=1.7m/s) AoV Peak P.9 mmHg (<20mmHg) AoV Mean PG:              4.0 mmHg (1.7-11.5mmHg) LVOT Max Lul:            1.40 m/s (<=1.1m/s) AoV VTI:                 29.00 cm (18-25cm) LVOT VTI:                29.80 cm LVOT Diameter:           2.00 cm  (1.8-2.4cm) AoV Area, VTI:           3.23 cm2 (2.5-5.5cm2) AoV Area,Vmax:           2.95 cm2 (2.5-4.5cm2) AoV Dimensionless Index: 1.03 AORTIC INSUFFICIENCY: AI Vmax:       4.42 m/s AI Half-time:  593 msec AI Decel Rate: 185.00 cm/s2  RIGHT VENTRICLE: RV Basal 3.30 cm RV Mid   2.50 cm RV Major 6.1 cm TAPSE:   17.9 mm RV s'    0.12 m/s TRICUSPID VALVE/RVSP:                             Normal Ranges: Peak TR Velocity: 2.39 m/s RV Syst Pressure: 25.8 mmHg (< 30mmHg) PULMONIC VALVE:                      Normal Ranges: PV Max Lul: 1.1 m/s  (0.6-0.9m/s) PV Max P.0 mmHg Pulmonary Veins: PulmV A Revs Dur: 148.00 msec PulmV A Revs Lul: 24.30 cm/s PulmV Rivas Lul:   40.40 cm/s PulmV S/D Lul:    1.10 PulmV Sys Lul:    43.20 cm/s  [id number] Lan Santos MD Electronically signed on 2024 at 9:23:03 AM  ** Final **    No DXA results found for the past 12 months     Transthoracic Echo (TTE) [facility], [address] [place] [address]                 Tel [phone] and Fax [phone]    TRANSTHORACIC ECHOCARDIOGRAM REPORT       Patient Name:      AUTUMN Tracy Physician:    [id number] Lan Santos MD  Study Date:        2024             Ordering Provider:    [id number] BHATIA  MRN/PID:           [id number]             Fellow:  Accession#:        [id number]         Nurse:                Alyce Bonilla RN  Date of Birth/Age: 1955 / 69 years  Sonographer:          Ruthann Andrade                                                                [facility]  Gender:            F                    Additional Staff:  Height:            160.02 cm            Admit Date:           2024  Weight:            59.88 kg             Admission Status:      Inpatient -                                                                Priority discharge  BSA / BMI:         1.62 m2 / 23.38      Encounter#:           [id number]                     kg/m2                                          Department Location:  [facility]                                                                Invasive  Blood Pressure: 160 /94 mmHg    Study Type:    TRANSTHORACIC ECHO (TTE) COMPLETE  Diagnosis/ICD: Chest pain, unspecified-R07.9  Indication:    Exertional dyspnea  CPT Code:      Echo Complete w Full Doppler-[id number]    Patient History:  Pertinent History: Aneurysm; Stroke; Chest pain; Myasthenia Gravis; Anemia; POWERS;                     Abnormal EKG.    Study Detail: The following Echo studies were performed: 2D, M-Mode, Doppler and                color flow. Agitated saline used as a contrast agent for                intraseptal flow evaluation.       PHYSICIAN INTERPRETATION:  Left Ventricle: The left ventricular systolic function is normal, with an estimated ejection fraction of 70%. There are no regional wall motion abnormalities. The left ventricular cavity size is normal. Spectral Doppler shows a normal pattern of left ventricular diastolic filling. There are normal left ventricular filling pressures. There is anterobasal septal hypertrophy with a sigmoid septum.  Left Atrium: The left atrium is upper limits of normal in size. A bubble study using agitated saline was very technically difficult and unable to be interpreted.  Right Ventricle: The right ventricle is normal in size. There is normal right ventricular global systolic function.  Right Atrium: The right atrium is normal in size.  Aortic Valve: The aortic valve is trileaflet. There is mild aortic valve cusp calcification. There is mild aortic valve regurgitation. The peak instantaneous gradient of the aortic valve is 8.9 mmHg. The mean gradient of the aortic valve is 4.0 mmHg.  Mitral Valve: The mitral valve  is mildly thickened. There is mild mitral valve regurgitation.  Tricuspid Valve: The tricuspid valve is structurally normal. There is mild tricuspid regurgitation. The Doppler estimated RVSP is within normal limits at 25.8 mmHg.  Pulmonic Valve: The pulmonic valve is structurally normal. There is physiologic pulmonic valve regurgitation.  Pericardium: There is a trivial pericardial effusion.  Aorta: The aortic root is normal. The aortic root is at the upper limits of normal size.  Systemic Veins: The inferior vena cava appears to be of normal size. There is IVC inspiratory collapse greater than 50%.  In comparison to the previous echocardiogram(s): There are no prior studies on this patient for comparison purposes.       CONCLUSIONS:   1. Left ventricular systolic function is normal with a 70% estimated ejection fraction.   2. There is anterobasal septal hypertrophy with a sigmoid septum.   3. RVSP within normal limits.   4. Mild aortic valve regurgitation.    QUANTITATIVE DATA SUMMARY:  2D MEASUREMENTS:                           Normal Ranges:  Ao Root d:     3.60 cm   (2.0-3.7cm)  LAs:           3.70 cm   (2.7-4.0cm)  IVSd:          0.90 cm   (0.6-1.1cm)  LVPWd:         0.70 cm   (0.6-1.1cm)  LVIDd:         4.20 cm   (3.9-5.9cm)  LVIDs:         2.60 cm  LV Mass Index: 62.5 g/m2  LV % FS        38.1 %    LA VOLUME:                              Normal Ranges:  LA Volume Index: 33.5 ml/m2    RA VOLUME BY A/L METHOD:                        Normal Ranges:  RA Area A4C: 11.9 cm2    AORTA MEASUREMENTS:                     Normal Ranges:  Asc Ao, d: 3.00 cm (2.1-3.4cm)    LV SYSTOLIC FUNCTION BY 2D PLANIMETRY (MOD):                      Normal Ranges:  EF-A4C View: 76.2 % (>=55%)  EF-A2C View: 77.9 %  EF-Biplane:  76.9 %    LV DIASTOLIC FUNCTION:                                Normal Ranges:  MV Peak E:        0.68 m/s    (0.7-1.2 m/s)  MV Peak A:        0.70 m/s    (0.42-0.7 m/s)  E/A Ratio:        0.98         (1.0-2.2)  MV e'             0.08 m/s    (>8.0)  MV lateral e'     0.09 m/s  MV medial e'      0.07 m/s  MV A Dur:         137.50 msec  E/e' Ratio:       8.54        (<8.0)  MV DT:            216 msec    (150-240 msec)  PulmV Sys Lul:    43.20 cm/s  PulmV Rivas Lul:   40.40 cm/s  PulmV S/D Lul:    1.10  PulmV A Revs Lul: 24.30 cm/s  PulmV A Revs Dur: 148.00 msec    MITRAL VALVE:                  Normal Ranges:  MV DT: 216 msec (150-240msec)    MITRAL INSUFFICIENCY:                       Normal Ranges:  MR VTI:  175.00 cm  MR Vmax: 543.00 cm/s    AORTIC VALVE:                                    Normal Ranges:  AoV Vmax:                1.49 m/s (<=1.7m/s)  AoV Peak P.9 mmHg (<20mmHg)  AoV Mean P.0 mmHg (1.7-11.5mmHg)  LVOT Max Lul:            1.40 m/s (<=1.1m/s)  AoV VTI:                 29.00 cm (18-25cm)  LVOT VTI:                29.80 cm  LVOT Diameter:           2.00 cm  (1.8-2.4cm)  AoV Area, VTI:           3.23 cm2 (2.5-5.5cm2)  AoV Area,Vmax:           2.95 cm2 (2.5-4.5cm2)  AoV Dimensionless Index: 1.03    AORTIC INSUFFICIENCY:  AI Vmax:       4.42 m/s  AI Half-time:  593 msec  AI Decel Rate: 185.00 cm/s2       RIGHT VENTRICLE:  RV Basal 3.30 cm  RV Mid   2.50 cm  RV Major 6.1 cm  TAPSE:   17.9 mm  RV s'    0.12 m/s    TRICUSPID VALVE/RVSP:                              Normal Ranges:  Peak TR Velocity: 2.39 m/s  RV Syst Pressure: 25.8 mmHg (< 30mmHg)    PULMONIC VALVE:                       Normal Ranges:  PV Max Lul: 1.1 m/s  (0.6-0.9m/s)  PV Max P.0 mmHg    Pulmonary Veins:  PulmV A Revs Dur: 148.00 msec  PulmV A Revs Lul: 24.30 cm/s  PulmV Rivas Lul:   40.40 cm/s  PulmV S/D Lul:    1.10  PulmV Sys Lul:    43.20 cm/s       [id number] Lan Santos MD  Electronically signed on 2024 at 9:23:03 AM       ** Final **     === 24 ===    XR CHEST 2 VIEWS    - Impression -  1. No acute cardiopulmonary process.    I personally reviewed the images/study and I agree with the  findings  as stated by resident physician Dr. Jay Mariano . This study  was interpreted at [facility],  [place], [place].    Signed by: Yinka Saucedo 6/6/2024 3:50 AM  Dictation workstation:   [id number]   === 02/16/23 ===    MR NECK ANGIO WO IV CONTRAST    - Impression -  1. Nonspecific white matter changes most likely represent  small-vessel ischemic disease in a patient of this age. There is no  evidence of acute ischemic injury.  2. No measurable stenosis on MRA of the neck within the limitations  described.  3. No hemodynamically significant stenosis or proximal large branch  vessel cutoff on MRA of the head. There is a focal outpouching  projecting inferiorly from the junction of the A1 and A2 segments of  the left JOSEP favored to represent the origin of an otherwise poorly  seen branch vessel, however, a 1.5-2 mm aneurysm cannot be entirely  excluded and follow-up is recommended to assess for stability.            Transthoracic Echo (TTE) Complete 06/08/2024 [id number] Final     Assessment/plan  69 y.o. female with PMH of myasthenia gravis on methotrexate 20 mg and monthly plasmapharesis and IVIF, iron deficiency anemia. She has been following with the heme/onc clinic for management and work-up of her iron deficiency anemia.     As part of her work-up, recent autoimmune lab work-up from 7/2024 showed mildly elevated NAINA  Positive w/ 1:160 homogeneous titer - +anti-SSA ab's at 2.3 and +anti-dsDNA ab's at 10.0. IgG elevated at 2630 - IgM and IgA WNL. Kappa, lambda and ratio all WNL. She reports arthalgia in the small joints of the hands however they appear more osteoarthritis in nature compared to an inflammatory autoimmune pathology.     Plan  - today we discussed with the patient that mildly elevated NAINA titers, SSA antibodies and mildly elevated DsDNA can be seen with increased age. Of note, she has also been receiving monthly IVIG infusions for her  myasthenia gravis for the past four years, which could also be contributing to her mildly elevated autoimmune titers.   - she has no signs or manifestations of any autoimmune connective tissue disease at this time    Page was seen today for abnormal lab.  Diagnoses and all orders for this visit:  Osteoarthritis of multiple joints, unspecified osteoarthritis type (Primary)  Iron deficiency anemia secondary to inadequate dietary iron intake  -     Referral to Rheumatology        Alexa Villeda MD    Patient seen and evaluated with Dr. Annie Villeda M.D.  Rheumatology Fellow, PGY4  [facility]

## 2024-08-22 ENCOUNTER — INFUSION (OUTPATIENT)
Dept: HEMATOLOGY/ONCOLOGY | Facility: CLINIC | Age: 69
End: 2024-08-22
Payer: COMMERCIAL

## 2024-08-22 VITALS
DIASTOLIC BLOOD PRESSURE: 66 MMHG | OXYGEN SATURATION: 96 % | SYSTOLIC BLOOD PRESSURE: 108 MMHG | RESPIRATION RATE: 16 BRPM | HEIGHT: 62 IN | WEIGHT: 132 LBS | HEART RATE: 69 BPM | BODY MASS INDEX: 24.29 KG/M2 | TEMPERATURE: 97 F

## 2024-08-22 DIAGNOSIS — K90.9 MALABSORPTION OF IRON (HHS-HCC): ICD-10-CM

## 2024-08-22 DIAGNOSIS — D50.8 IRON DEFICIENCY ANEMIA SECONDARY TO INADEQUATE DIETARY IRON INTAKE: ICD-10-CM

## 2024-08-22 PROCEDURE — 96365 THER/PROPH/DIAG IV INF INIT: CPT | Mod: INF

## 2024-08-22 PROCEDURE — 96375 TX/PRO/DX INJ NEW DRUG ADDON: CPT | Mod: INF

## 2024-08-22 PROCEDURE — 2500000004 HC RX 250 GENERAL PHARMACY W/ HCPCS (ALT 636 FOR OP/ED): Performed by: PHYSICIAN ASSISTANT

## 2024-08-22 RX ORDER — DIPHENHYDRAMINE HYDROCHLORIDE 50 MG/ML
50 INJECTION INTRAMUSCULAR; INTRAVENOUS AS NEEDED
OUTPATIENT
Start: 2024-09-05

## 2024-08-22 RX ORDER — HEPARIN 100 UNIT/ML
500 SYRINGE INTRAVENOUS AS NEEDED
Status: DISCONTINUED | OUTPATIENT
Start: 2024-08-22 | End: 2024-08-22 | Stop reason: HOSPADM

## 2024-08-22 RX ORDER — HEPARIN 100 UNIT/ML
500 SYRINGE INTRAVENOUS AS NEEDED
OUTPATIENT
Start: 2024-08-22

## 2024-08-22 RX ORDER — EPINEPHRINE 0.3 MG/.3ML
0.3 INJECTION SUBCUTANEOUS EVERY 5 MIN PRN
OUTPATIENT
Start: 2024-09-05

## 2024-08-22 RX ORDER — ALBUTEROL SULFATE 0.83 MG/ML
3 SOLUTION RESPIRATORY (INHALATION) AS NEEDED
OUTPATIENT
Start: 2024-09-05

## 2024-08-22 RX ORDER — FAMOTIDINE 10 MG/ML
20 INJECTION INTRAVENOUS ONCE
OUTPATIENT
Start: 2024-09-05 | End: 2024-09-05

## 2024-08-22 RX ORDER — FAMOTIDINE 10 MG/ML
20 INJECTION INTRAVENOUS ONCE
Status: COMPLETED | OUTPATIENT
Start: 2024-08-22 | End: 2024-08-22

## 2024-08-22 RX ORDER — FAMOTIDINE 10 MG/ML
20 INJECTION INTRAVENOUS ONCE AS NEEDED
OUTPATIENT
Start: 2024-09-05

## 2024-08-22 RX ORDER — HEPARIN SODIUM,PORCINE/PF 10 UNIT/ML
50 SYRINGE (ML) INTRAVENOUS AS NEEDED
OUTPATIENT
Start: 2024-08-22

## 2024-08-22 ASSESSMENT — PAIN SCALES - GENERAL: PAINLEVEL: 0-NO PAIN

## 2024-08-22 NOTE — PROGRESS NOTES
Patient is here for first dose of Feraheme. RN gave patient premeds and waited 30 min before giving Feraheme. Patient tolerated the treatment well. VS WNL. Patient's port was flushed with heparin and de accessed without difficulty. Patient was discharged in stable condition.

## 2024-08-26 DIAGNOSIS — G70.00 MYASTHENIA GRAVIS (MULTI): Primary | ICD-10-CM

## 2024-08-26 RX ORDER — METHOTREXATE 2.5 MG/1
2.5 TABLET ORAL
Qty: 32 TABLET | Refills: 6 | Status: SHIPPED | OUTPATIENT
Start: 2024-08-26

## 2024-09-05 ENCOUNTER — INFUSION (OUTPATIENT)
Dept: HEMATOLOGY/ONCOLOGY | Facility: CLINIC | Age: 69
End: 2024-09-05
Payer: COMMERCIAL

## 2024-09-05 VITALS
SYSTOLIC BLOOD PRESSURE: 125 MMHG | BODY MASS INDEX: 24.44 KG/M2 | OXYGEN SATURATION: 100 % | HEART RATE: 63 BPM | HEIGHT: 62 IN | RESPIRATION RATE: 16 BRPM | WEIGHT: 132.8 LBS | TEMPERATURE: 97.5 F | DIASTOLIC BLOOD PRESSURE: 69 MMHG

## 2024-09-05 DIAGNOSIS — D50.8 IRON DEFICIENCY ANEMIA SECONDARY TO INADEQUATE DIETARY IRON INTAKE: ICD-10-CM

## 2024-09-05 DIAGNOSIS — K90.9 MALABSORPTION OF IRON (HHS-HCC): ICD-10-CM

## 2024-09-05 PROCEDURE — 2500000004 HC RX 250 GENERAL PHARMACY W/ HCPCS (ALT 636 FOR OP/ED): Performed by: PHYSICIAN ASSISTANT

## 2024-09-05 PROCEDURE — 96375 TX/PRO/DX INJ NEW DRUG ADDON: CPT | Mod: INF

## 2024-09-05 PROCEDURE — 96365 THER/PROPH/DIAG IV INF INIT: CPT | Mod: INF

## 2024-09-05 PROCEDURE — 2500000004 HC RX 250 GENERAL PHARMACY W/ HCPCS (ALT 636 FOR OP/ED): Mod: JZ,JG | Performed by: PHYSICIAN ASSISTANT

## 2024-09-05 RX ORDER — ALBUTEROL SULFATE 0.83 MG/ML
3 SOLUTION RESPIRATORY (INHALATION) AS NEEDED
OUTPATIENT
Start: 2024-09-05

## 2024-09-05 RX ORDER — EPINEPHRINE 0.3 MG/.3ML
0.3 INJECTION SUBCUTANEOUS EVERY 5 MIN PRN
OUTPATIENT
Start: 2024-09-05

## 2024-09-05 RX ORDER — FAMOTIDINE 10 MG/ML
20 INJECTION INTRAVENOUS ONCE
Status: COMPLETED | OUTPATIENT
Start: 2024-09-05 | End: 2024-09-05

## 2024-09-05 RX ORDER — FAMOTIDINE 10 MG/ML
20 INJECTION INTRAVENOUS ONCE
OUTPATIENT
Start: 2024-09-05 | End: 2024-09-05

## 2024-09-05 RX ORDER — HEPARIN SODIUM,PORCINE/PF 10 UNIT/ML
50 SYRINGE (ML) INTRAVENOUS AS NEEDED
OUTPATIENT
Start: 2024-09-05

## 2024-09-05 RX ORDER — HEPARIN 100 UNIT/ML
500 SYRINGE INTRAVENOUS AS NEEDED
OUTPATIENT
Start: 2024-09-05

## 2024-09-05 RX ORDER — DIPHENHYDRAMINE HYDROCHLORIDE 50 MG/ML
50 INJECTION INTRAMUSCULAR; INTRAVENOUS AS NEEDED
OUTPATIENT
Start: 2024-09-05

## 2024-09-05 RX ORDER — HEPARIN 100 UNIT/ML
500 SYRINGE INTRAVENOUS AS NEEDED
Status: DISCONTINUED | OUTPATIENT
Start: 2024-09-05 | End: 2024-09-05 | Stop reason: HOSPADM

## 2024-09-05 RX ORDER — FAMOTIDINE 10 MG/ML
20 INJECTION INTRAVENOUS ONCE AS NEEDED
OUTPATIENT
Start: 2024-09-05

## 2024-09-05 ASSESSMENT — PAIN SCALES - GENERAL: PAINLEVEL: 0-NO PAIN

## 2024-09-05 NOTE — PROGRESS NOTES
Patient identified  by name &   Patient denies acute distress- none noted. VSS pre, post infusion + observation period. Patient to have blood work 10/1 with FUV 10/15- verbalized understanding. Patient discharged home in stable condition.

## 2024-09-06 DIAGNOSIS — G70.00 MYASTHENIA GRAVIS (MULTI): ICD-10-CM

## 2024-09-06 RX ORDER — PREDNISONE 10 MG/1
10 TABLET ORAL DAILY
Qty: 90 TABLET | Refills: 3 | Status: SHIPPED | OUTPATIENT
Start: 2024-09-06

## 2024-09-25 ENCOUNTER — APPOINTMENT (OUTPATIENT)
Dept: NEUROLOGY | Facility: HOSPITAL | Age: 69
End: 2024-09-25
Payer: COMMERCIAL

## 2024-10-07 ENCOUNTER — APPOINTMENT (OUTPATIENT)
Dept: GASTROENTEROLOGY | Facility: CLINIC | Age: 69
End: 2024-10-07
Payer: COMMERCIAL

## 2024-10-15 ENCOUNTER — TELEMEDICINE (OUTPATIENT)
Dept: HEMATOLOGY/ONCOLOGY | Facility: HOSPITAL | Age: 69
End: 2024-10-15
Payer: COMMERCIAL

## 2024-10-15 DIAGNOSIS — D50.8 IRON DEFICIENCY ANEMIA SECONDARY TO INADEQUATE DIETARY IRON INTAKE: ICD-10-CM

## 2024-10-15 DIAGNOSIS — K90.9 MALABSORPTION OF IRON (HHS-HCC): ICD-10-CM

## 2024-10-15 PROCEDURE — 1159F MED LIST DOCD IN RCRD: CPT | Performed by: PHYSICIAN ASSISTANT

## 2024-10-15 PROCEDURE — 1160F RVW MEDS BY RX/DR IN RCRD: CPT | Performed by: PHYSICIAN ASSISTANT

## 2024-10-15 PROCEDURE — 99442 PR PHYS/QHP TELEPHONE EVALUATION 11-20 MIN: CPT | Performed by: PHYSICIAN ASSISTANT

## 2024-10-15 NOTE — PROGRESS NOTES
Patient ID: Page Huston is a 69 y.o. female.  Referring Physician: Phil Felix PA-C  [id number] [place] [address]  Primary Care Provider: Annamarie Dickinson MD  Visit Type: Follow Up    Location: [facility]  Diagnosis/Reason: Multifactorial Anemia - Iron Deficiency (Likely 2/2 Poor Dietary Intake/Hx Anorexia) and Underlying Inflammation (AoCD)    Verbal consent was requested and obtained from patient on this date for a telehealth visit.    Interval Hx:  10/15/24  Page Huston is a 69 y.o. female following up today for multifactorial anemia d/t iron deficiency (likely 2/2 poor dietary intake/hx of anorexia) and underlying inflammation (AoCD)     NOTE:  7/23/24 - Patient reports she has a medical history of anorexia that has been ongoing for at least the past 50 years  Patient also has medical history significant for myasthenia gravis which is being treated w/ plasmapheresis and IVIG    Patient denies weight loss, abnormal bruising and bleeding, hematuria, blood in stool, dark/black stools, epistaxis, oral/gingival bleeding, lymphadenopathy, recurrent infections, recurrent fevers, night sweats, early satiety, abdominal pain, bone pain, chest pain, palpitations, SOB, POWERS, fatigue, dizziness, lightheadedness, PICA.    Relevant Hx:  7/23/24 - Initial Visit  Page Huston is a 69 y.o. female referred for consultation of anemia    Per review of records:  WBC counts WNL historically - Differentials predominantly WNL historically  Normocytic, predominantly hypochromic anemia since at least 2/28/19 with Hb range of 8.3 to 12.7 since then - RBC counts & Hct's low - RDW's elevated  Platelets predominantly WNL historically    Previous Hematological Background:  Hx of hematological disorders: Yes - Patient reports a hematologic history of anemia  Hx of blood transfusions: No - Patient denies prior blood transfusion  Hx of iron supplementation: Yes - IV and Oral supplementation - C/O constipation, dark stools, nausea,  abdominal cramping - Agents: FeSO4  Hx of B12 supplementation: No - Denies any prior supplementation  Hx of folate supplementation: No - Denies any prior supplementation    Today she c/o gingival bleeding that has been ongoing for the past year, intermittent night sweats, intermittent altered taste, intermittent palpitations (has been evaluated by cardiology and diagnosed w/ mitral valve regurgitation), SOB w/ exertion, fatigue, PICA for sweet foods     Patient denies weight loss, abnormal bruising and bleeding, hematuria, blood in stool, dark/black stools, epistaxis, lymphadenopathy, recurrent infections, recurrent fevers, night sweats, early satiety, abdominal pain, bone pain, chest pain, dizziness, lightheadedness.    PMHx:  Active Ambulatory Problems     Diagnosis Date Noted   • Acid reflux 10/15/2023   • Anxiety 10/15/2023   • Double vision 10/15/2023   • Aneurysm (CMS-HCC) 10/15/2023   • Fatigue 10/15/2023   • Insomnia 10/15/2023   • Muscle weakness 10/15/2023   • Myasthenia gravis with exacerbation, generalized (Multi) 10/15/2023   • Myasthenia (Multi) 10/15/2023   • Muscle cramps 10/15/2023   • Stroke (Multi) 10/15/2023   • Chest pain, unspecified type 06/06/2024   • Iron deficiency anemia secondary to inadequate dietary iron intake 08/13/2024   • Malabsorption of iron ([facility]) 08/13/2024     Resolved Ambulatory Problems     Diagnosis Date Noted   • No Resolved Ambulatory Problems     No Additional Past Medical History       PSHx:  Past Surgical History:   Procedure Laterality Date   • CT HEAD ANGIO W AND WO IV CONTRAST  3/17/2023    CT HEAD ANGIO W AND WO IV CONTRAST POR [id number] CT   • HYSTERECTOMY  07/26/2017    Hysterectomy   • IR CVC TUNNELED  5/5/2020    IR CVC TUNNELED 5/5/2020 [facility]   • MR HEAD ANGIO WO IV CONTRAST  2/16/2023    MR HEAD ANGIO WO IV CONTRAST POR CALLIE MR MOBILE   • MR NECK ANGIO WO IV CONTRAST  2/16/2023    MR NECK ANGIO WO IV CONTRAST POR CALLIE MR MOBILE      Eliseo  teeth extractions    FHx:  Family History   Problem Relation Name Age of Onset   • Cancer Mother     • Cancer Father     • Cancer Sister     • Cancer Brother        Mother: Stomach CA,   Father: Throat & Lung CA  Siblings: Brother - Lung CA, Sister - Lung CA  Children: 2 - 1 daughter w/ total hip replacement  Nephew: Systemic Mastocytosis  Miscarriages: Denies    Social Hx:  Page Huston    reports that she has never smoked. She has never been exposed to tobacco smoke. She has never used smokeless tobacco.  She  reports current alcohol use.  She  reports no history of drug use.  Social History     Socioeconomic History   • Marital status:    Tobacco Use   • Smoking status: Never     Passive exposure: Never   • Smokeless tobacco: Never   Substance and Sexual Activity   • Alcohol use: Yes     Comment: socially   • Drug use: Never   • Sexual activity: Defer     Social Determinants of Health     Financial Resource Strain: Low Risk  (2024)    Overall Financial Resource Strain (CARDIA)    • Difficulty of Paying Living Expenses: Not hard at all   Transportation Needs: No Transportation Needs (2024)    PRAPARE - Transportation    • Lack of Transportation (Medical): No    • Lack of Transportation (Non-Medical): No   Housing Stability: Low Risk  (2024)    Housing Stability Vital Sign    • Unable to Pay for Housing in the Last Year: No    • Number of Places Lived in the Last Year: 1    • Unstable Housing in the Last Year: No      Living Situation: Lives at home alone  Occupation: Part-time in retail  Marital Status:   Alcohol Use: Occasionally  Smoking: Never smoker  Recreational Drug Use: Denies  Special Diets: Patient reports she has been anorectic at 22 y/o    Cancer Screenings:  Upper EGD: Denies  Colonoscopy: Denies   Mammogram: 20  PAP smear: 12  Lung cancer screenings: None in EMR    Medications and allergies reviewed in EMR.    ROS:  Review of Systems - Oncology   10 point  "review of systems negative except as state in HPI.    Vitals & Statistics:  Objective   BSA: There is no height or weight on file to calculate BSA.  There were no vitals taken for this visit.    Physical Exam:  Physical Exam  N/A - Virtual Visit    Results:  Lab Results   Component Value Date    WBC 9.6 07/23/2024    NEUTROABS 7.58 (H) 07/23/2024    IGABSOL 0.03 07/23/2024    LYMPHSABS 1.31 07/23/2024    MONOSABS 0.62 07/23/2024    EOSABS 0.04 07/23/2024    BASOSABS 0.06 07/23/2024    RBC 3.85 (L) 07/23/2024    MCV 89 07/23/2024    MCHC 31.1 (L) 07/23/2024    HGB 10.6 (L) 07/23/2024    HCT 34.1 (L) 07/23/2024     07/23/2024     Lab Results   Component Value Date    RETICCTPCT 1.1 07/23/2024      Lab Results   Component Value Date    CREATININE 0.78 07/23/2024    BUN 19 07/23/2024    EGFR 81 07/23/2024     07/23/2024    K 3.2 (L) 07/23/2024     07/23/2024    CO2 28 07/23/2024      Lab Results   Component Value Date    ALT 15 07/23/2024    AST 23 07/23/2024    ALKPHOS 71 07/23/2024    BILITOT 0.5 07/23/2024      Lab Results   Component Value Date    TSH 0.76 07/23/2024     Lab Results   Component Value Date    TSH 0.76 07/23/2024     Lab Results   Component Value Date    IRON 21 (L) 07/23/2024    TIBC 395 07/23/2024    FERRITIN 22 07/23/2024      Lab Results   Component Value Date    LWOTQKYT84 717 07/23/2024      Lab Results   Component Value Date    FOLATE 11.6 07/23/2024     Lab Results   Component Value Date    NAINA Positive (A) 07/23/2024    RF <10 07/23/2024    SEDRATE 24 07/23/2024      Lab Results   Component Value Date    CRP 0.32 07/23/2024      No results found for: \"JODY\"  Lab Results   Component Value Date     07/23/2024     Lab Results   Component Value Date    HAPTOGLOBIN 107 07/23/2024     Lab Results   Component Value Date    SPEP Increase in polyclonal gamma globulins.   07/23/2024     Lab Results   Component Value Date    IGG 2,630 (H) 07/23/2024    IGM 65 07/23/2024    IGA " "72 2024     No results found for: \"HEPATOT\", \"HEPAIGM\", \"HEPBCIGM\", \"HEPBCAB\", \"HEPBSAG\", \"HEPCAB\"  No results found for: \"HIV1X2\"    Assessment:  Page Huston is a 69 y.o. female following up today for multifactorial anemia d/t iron deficiency (likely 2/2 poor dietary intake/hx of anorexia) and underlying inflammation (AoCD)     I reviewed patient's chart including but not limited to labs, imaging, surgical/procedure notes, pathology, hospital notes, doctor's notes.    Relevant historical labs/imagin24  Iron studies: Ferritin low at 17 - Iron WNL at 36 - TIBC WNL - %Sat low at 8%    24 results (done at [facility]):  WBC count WNL - Isolated neutrophilia at 7.58   NC/HC anemia w/ Hb improved to 11.1 - RBC count WNL at 3.90, Hct low at 35.1%, RDW not reported  Platelets WNL at 241K  Iron studies: No ferritin reported - Iron WNL at 69 - No %Sat or TIBC reported      Plan:  24 - Hematologic workup positive for neutrophilia at 7.58, NC/HC anemia w/ Hb improved to 10.6, iron deficiency and indications of possible rheumatologic condition as seen by +NAINA w/ +anti-SSA and +anti-dsDNA ab's. There's inflammation of underlying inflammation seen on SPEP w/ increased polyclonal gamma globulins. There is no evidence of paraprotein, hemolysis, liver disease, renal disease, thyroid disease.    Multifactorial Anemia - Iron Deficiency & Underlying Inflammation (Likely AoCD)  Continue FeSO4 325mg 1 tab PO QD  Patient to have repeat CBC-D, and full iron studies drawn at [facility] near her home (Fax: [phone]) - Requisitions faxed to the lab per patient request  Patient to have labs drawn over the next 14 days  RTC in 2 weeks via virtual/telehealth visit - F/U sooner if needed/urgent  CBC-D, CMP, Iron studies up to 1 week prior    I had an extensive discussion with the patient regarding the diagnosis and discussed the plan of therapy, including general considerations regarding side effects and outcomes. Pt " understood and gave appropriate teach back about the plan of care. All questions were answered to the patient's satisfaction. The patient is instructed to contact us at any time if questions or problems arise. Thank you for the opportunity to participate in the care of this very pleasant patient.    Total time = 30 minutes. 50% or more of this time was spent in counseling and/or coordination of care including reviewing medical history/radiology/labs, examining patient, formulating outlined plan with team, and discussing plan with patient/family.    Phil Felix PA-C

## 2024-10-31 ENCOUNTER — APPOINTMENT (OUTPATIENT)
Dept: HEMATOLOGY/ONCOLOGY | Facility: HOSPITAL | Age: 69
End: 2024-10-31
Payer: COMMERCIAL

## 2024-11-21 ENCOUNTER — APPOINTMENT (OUTPATIENT)
Dept: HEMATOLOGY/ONCOLOGY | Facility: HOSPITAL | Age: 69
End: 2024-11-21
Payer: COMMERCIAL

## 2024-11-26 DIAGNOSIS — F41.9 ANXIETY: ICD-10-CM

## 2024-11-26 DIAGNOSIS — G47.00 INSOMNIA, UNSPECIFIED TYPE: ICD-10-CM

## 2024-11-27 RX ORDER — TRAZODONE HYDROCHLORIDE 50 MG/1
TABLET ORAL
Qty: 30 TABLET | Refills: 6 | Status: SHIPPED | OUTPATIENT
Start: 2024-11-27

## 2024-12-12 ENCOUNTER — APPOINTMENT (OUTPATIENT)
Dept: HEMATOLOGY/ONCOLOGY | Facility: HOSPITAL | Age: 69
End: 2024-12-12
Payer: COMMERCIAL

## 2024-12-29 NOTE — PROGRESS NOTES
"Page Huston is a 69 y.o. female on day 1 of admission presenting with Chest pain, unspecified type.    Subjective   - No overnight events.  - Received first treatment of PLEX last evening, with no complications.  - This morning, patient with frustration regarding medical and cardiac work-up, mostly with 6 months of increasing fatigue and shortness of breath on exertion. Also, she had a difficult day yesterday with internal jugular placement and PLEX late in afternoon.       Objective   Last Recorded Vitals  Blood pressure 136/77, pulse 62, temperature 36.1 °C (97 °F), resp. rate 18, height 1.6 m (5' 3\"), weight 59.9 kg (132 lb), SpO2 96%.    Negative Inspiratory Force (NIF): -30 (06/07/24 0905)      Physical Exam  Neurological Exam    MG-specific exam:  Ptosis on sustained upgaze bilaterally  Single breath count: 23  Neck flexion: 5-/5  Neck extension: 5-/5  +mild hoarseness     Mental State:  A&Ox4, conversational, tearful  Follows complex commands  Language intact for comprehension, repetition, expression, naming     Cranial Nerves  - I/III: PERRL  - II: VFF  - III, IV, VI: EOMI no nystagmus. Minimal L sided ptosis (baseline)  - V: V1-V3 sensation intact bilaterally  - VII: Face muscles symmetric with smile, eye closure, eyebrow raising, and cheek puffing  - VIII: Intact to interview  - IX, X: Palate elevated symmetrically, bilaterally.  - XI: 5/5 strength on shoulder shrug bilaterally  - XII: Tongue midline without atrophy or fasciculations     MOTOR EXAM:  Muscle bulk and tone were normal in UE and LE  No fasciculations, tremor, or other abnormal movements present     STRENGTH:      R          L  Deltoid             5       5-  Biceps              5        5  Triceps             5        5                   5         5     Hip flexion       5         5  Quadriceps      5         5  Hamstrings      5         5  DorsiFlex          5         5  PlantarFlex       5         5     REFLEXES:        R          " Subjective   Patient ID: Laisha Moya is a 23 y.o. female here with her mother. She present today with a chief complaint of left ankle/foot pain s/p inversion type injury that occurred today while carrying her baby. She is unable to bear weight. History of OI type 1.    History of Present Illness  HPI    Past Medical History  Allergies as of 12/28/2024 - Reviewed 12/28/2024   Allergen Reaction Noted    Suprax [cefixime] Hives 12/14/2023       (Not in a hospital admission)       No past medical history on file.    No past surgical history on file.     reports that she has never smoked. She has never used smokeless tobacco. She reports that she does not drink alcohol and does not use drugs.    Review of Systems  Review of Systems                               Objective    Vitals:    12/28/24 1927   BP: 129/68   Pulse: 109   Resp: 20   Temp: 36.6 °C (97.9 °F)   SpO2: 100%   Weight: 65.3 kg (144 lb)     Patient's last menstrual period was 12/07/2024 (exact date).    Physical Exam  Constitutional:       Appearance: Normal appearance.   Cardiovascular:      Pulses: Normal pulses.   Musculoskeletal:      Comments: +tenderness to palpation, left lateral malleolus with associated swelling   Skin:     General: Skin is warm and dry.      Capillary Refill: Capillary refill takes less than 2 seconds.   Neurological:      General: No focal deficit present.      Mental Status: She is alert.      Sensory: No sensory deficit.         Procedures    Point of Care Test & Imaging Results from this visit  No results found for this visit on 12/28/24.   No results found.    Diagnostic study results (if any) were reviewed by Starr Teran MD.    Assessment/Plan   Allergies, medications, history, and pertinent labs/EKGs/Imaging reviewed by Starr Teran MD.     Medical Decision Making      Orders and Diagnoses  Diagnoses and all orders for this visit:  Left foot pain  -     XR foot left 3+ views; Future  Acute left ankle pain  -     XR  "L  Biceps              3          3  Brachioradialis 3          3  Patellar             3         3  Achilles            2          2  Plantar             Down      Down     COORDINATION: Intact on finger to nose bilaterally  SENSORY: Intact to light touch in bilateral UE and LE   GAIT: Deferred due to pt safety.    This patient has a central line   Reason for the central line remaining today? Dialysis/Hemapheresis    Assessment/Plan      Principal Problem:    Chest pain, unspecified type    Page Huston is a 69 y.o. female with a PMHx of seronegative myasthenia gravis (follows with Dr. Shipley), iron deficiency anemia (pending outpatient endoscopy with GI to evaluate for bleed) who presents to [facility] for worsening dyspnea on exertion, chest pain and reported \"abnormal EKG\" c/f MG exacerbation vs cardiac etiology. Admitted to neurology for MG exacerbation. Planning for 3 treatments of PLEX (6/6, 6/7, 6/8).     Pt has continued fatigue, mild neck flex/ext weakness, increased weakness LE>UE, worsening ptosis L>R, and worsening hoarseness of voice that may be attributed to MG exacerbation/flare. Pt still c/o dyspnea on exertion, substernal chest pain, new back pain that may be due to cardiac etiology despite normal trop/EKG. Iron deficiency anemia may be contributing to her fatigue and shortness of breath, started on Ferrous Sulfate 325mg per medicine team. Cardiac work-up in progress, per cardiology team. Will need medicine and cardiology follow up outpatient.     Plan  #MG exacerbation  #Seronegative MG  - Outpatient neuromuscular specialist, Dr Shipley, aware of admission  - Hold IVIG to avoid risk of hypercoagulation in the setting of a potential cardiac etiology for SOB  - NIF/VC q6h  Most recent result: Negative Inspiratory Force (NIF): -30 (06/07/24 0905)   - PLEX x 3 days (6/6, 6/7--)  - C/w home mestinon IR 60q4 and ER 180mg at bedtime  - C/w home methotrexate 20mg qweek and prednisone 10mg  - LRP4 Ab sent per Dr" ankle left 3+ views; Future      Medical Admin Record      Patient disposition: Home    Electronically signed by Starr Teran MD  7:38 PM       Violetta recs    #Exertional Dyspnea  #Chest pain  #c/f unstable angina  ::Trops and BNP WNL  ::LDL 94, A1C 5.7  ::TSH WNL  ::EKG with isolated nonpathologic Q wave in lead III (stable since 2019)  - TTE pending 6/7  - CT Coronary 6/7  - Cardiology consult, appreciate recs    #Iron Deficiency Anemia  #Chronic Anemia  ::baseline Hg 9-10, on admission Hg 9.8  ::Folic acid, B12 WNL  ::Iron 36, TIBC 445, %Sat 8%, ferritin 17, RDW 16.9  ::homocysteine 8.17  ::Medicine consulted, signed off on 6/6/24  - C/w home folic acid 1mg  - Started oral Ferrous Sulfate 325mg daily  - Monitor CBC   - MMA pending    #Chronic back pain  #Anxiety  #Insomnia  - Continue home gabapentin 300mg at bedtime  - Continue home trazadone 25mg PRN  - Continue home duloxetine DR 60mg daily  - Lidocaine patch and Diclofenac gel PRN  - Acetaminophen PRN    F: PRN  E: PRN  N: regular diet  DVT ppx: Lovenox  GI ppx: Protonix    Access:  - PIV  - R internal jugular CVC (6/6- )    Kristi Sánchez MD  Neurology PGY2

## 2025-01-29 ENCOUNTER — APPOINTMENT (OUTPATIENT)
Dept: NEUROLOGY | Facility: HOSPITAL | Age: 70
End: 2025-01-29
Payer: COMMERCIAL

## 2025-02-05 ENCOUNTER — APPOINTMENT (OUTPATIENT)
Dept: NEUROLOGY | Facility: HOSPITAL | Age: 70
End: 2025-02-05
Payer: COMMERCIAL

## 2025-03-06 DIAGNOSIS — G70.00 MYASTHENIA GRAVIS: ICD-10-CM

## 2025-03-06 RX ORDER — PYRIDOSTIGMINE BROMIDE 60 MG/1
60 TABLET ORAL AS NEEDED
Qty: 540 TABLET | Refills: 3 | Status: SHIPPED | OUTPATIENT
Start: 2025-03-06

## 2025-03-26 ENCOUNTER — OFFICE VISIT (OUTPATIENT)
Dept: NEUROLOGY | Facility: HOSPITAL | Age: 70
End: 2025-03-26
Payer: COMMERCIAL

## 2025-03-26 VITALS
DIASTOLIC BLOOD PRESSURE: 83 MMHG | TEMPERATURE: 96.8 F | HEART RATE: 67 BPM | HEIGHT: 62 IN | RESPIRATION RATE: 16 BRPM | SYSTOLIC BLOOD PRESSURE: 139 MMHG | WEIGHT: 128 LBS | BODY MASS INDEX: 23.55 KG/M2

## 2025-03-26 DIAGNOSIS — M54.50 CHRONIC LOW BACK PAIN WITHOUT SCIATICA, UNSPECIFIED BACK PAIN LATERALITY: ICD-10-CM

## 2025-03-26 DIAGNOSIS — G62.9 NEUROPATHY: Primary | ICD-10-CM

## 2025-03-26 DIAGNOSIS — G70.00 MYASTHENIA GRAVIS: ICD-10-CM

## 2025-03-26 DIAGNOSIS — G89.29 CHRONIC LOW BACK PAIN WITHOUT SCIATICA, UNSPECIFIED BACK PAIN LATERALITY: ICD-10-CM

## 2025-03-26 PROCEDURE — 99215 OFFICE O/P EST HI 40 MIN: CPT | Performed by: PSYCHIATRY & NEUROLOGY

## 2025-03-26 PROCEDURE — 1159F MED LIST DOCD IN RCRD: CPT | Performed by: PSYCHIATRY & NEUROLOGY

## 2025-03-26 PROCEDURE — 3008F BODY MASS INDEX DOCD: CPT | Performed by: PSYCHIATRY & NEUROLOGY

## 2025-03-26 PROCEDURE — 1125F AMNT PAIN NOTED PAIN PRSNT: CPT | Performed by: PSYCHIATRY & NEUROLOGY

## 2025-03-26 RX ORDER — GABAPENTIN 300 MG/1
300 CAPSULE ORAL NIGHTLY
Qty: 90 CAPSULE | Refills: 3 | Status: SHIPPED | OUTPATIENT
Start: 2025-03-26 | End: 2026-03-26

## 2025-03-26 ASSESSMENT — PAIN SCALES - GENERAL: PAINLEVEL_OUTOF10: 6

## 2025-03-26 NOTE — PROGRESS NOTES
Date of Service: 3/26/2025  Patient: Page Huston  MRN: [id number]  Referring Provider: No ref. provider found  Primary Care Physician: Annamarie Dickinson MD     History of Present Illness:    Page Huston is a 69 y.o. who presents for follow-up of seronegative myasthenia gravis on prednisone 10 mg daily, IVIG 1.5g/kg every 3 weeks. Has not tolerated azathioprine in the past, now on methotrexate 20 mg weekly with continued bothersome symptoms of constant double vision, daily fatigue, dyspnea with exertion, extremity weakness and some hoarseness of her voice affecting her quality of life.      Cannot increase prednisone; concern for weight gain, has a history of eating disorder. She recently decreased her prednisone to 10 mg daily.      Her prior iron studies showed low iron (Dec 2023), %sat (Dec 2023), low Hemoglobin 9.5 (May 2024).  She was recommended to undergo GI scope. She reports taking a daily iron oral supplementation. We wonder if iron infusions  might be a consideration as well as appropriate workup for causes of iron deficiency anemia - as they may be contributors to her fatigue and dyspnea with exertion. Fatigue and dyspnea with exertion can also be seen in myasthenia gravis.     Her diplopia, dysphagia are symptoms she is also currently experiencing.      She has failed high dose IVIG at 1.5 grams/kg every 3 weeks. We cannot increase prednisone due to side effects. She has not tolerated Imuran or cyclosporine in the past. Her myasthenia gravis has not been controlled with methotrexate which she is currently on and she is experiencing side effects of a change in taste and mouth pain with it.      Thus, she has tried 3 traditional immunosuppressants--Imuran, Cyclosporine and Methotrexate and either experienced severe side effects or on methotrexate currently--has not been controlled in terms of myasthenia gravis. The copay for Subcutaneous Ig was too high and not feasible.     Also not controlled on  "current overall regimen of IVIG 1.5 gram/kg every 3 weeks, methotrexate, and prednisone 10 mg daily, mestinon 60 mg QID and nightly Mestinon  mg. We cannot increase prednisone due to side effects as well.     Thus we are requesting approval for a medication that works in a different mechanism, a FcRn inhibitor, efgartigimod. There is clinical trial evidence supporting its use in seronegative myasthenia gravis patients. There is clinical trial data that supports it use in seronegative myasthenia gravis patients.     \" Art SINGH Jr, Merari V, Sushil T, Amadou C, Hima S, Galen Martinez JL, Patty H, Meiabeba A, Juaquin SR, Dana M, Qamar A, [place] Bear B, Romy S, Keya FRANKLIN, Topher K, Stan J, Bety R; ADAPT+ Study Group. Long-term safety, tolerability, and efficacy of efgartigimod (ADAPT+): interim results from a phase 3 open-label extension study in participants with generalized myasthenia gravis. Front Neurol. 2024 Jan 17;14:[id number]. doi: 10.3389/fneur.2023.4249885. PMID: 13904936; PMCID: [id number]. The major findings from this trial as it pertains to seronegative patients was that for seronegative patients, they had a similar significant reduction in MG-ADL score when compared to seropositive patients at a mean of week 3 in the cycle with receiving efgartigomid. A reduction in total IgG levels was also seen in seronegative patients.\"     Current MG ADL score: 11  MGFA Class IIIb     Plan:  - We will apply for insurance approval for [employer]; will obtain a quote on out of pocket costs  - She will continue methotrexate 20 mg weekly for now, prednisone 10mg, folate and B12 supplementation  - Referral to GI to screen for possible causes of her iron deficiency  - We will attempt to reach out to her PCP regarding appropriateness and possibility of Iron infusions for iron deficiency. Though, she is scheduled to see Hematology on 7/23/2024 regarding iron deficiency anemia and was encouraged to make this " appointment.   - Refilled mestinon ER today     - She is to stop by the lab for labs ordered since last visit: LRP4 ab testing, TSH, T4, Iron, TIBC, transferrin, ferritin      Donta De Leon MD  Neuromuscular Fellow     The patient was seen, examined, and discussed with  , Neuromuscular Attending.         Myasthenia Gravis MEDS TO AVOID:  You have Myasthenia gravis and below are the medications that should not be used (contraindicated).     1. Absolute contraindications (are life-threatening)             Curare              D-penicillamine             Botulinum toxin- Botox             Interferon alpha  *               Neuromuscular paralytic agents used in general anesthesia such as succinycholine, rocuronium, vecuronium, etc.  2. Contraindications (should be avoided)             Antibiotics-  o          Aminoglycosides- Gentamycin, Kanamycin, Amikacin, Neomycin, Streptomycin,      Tobramycin, Netilmycin, Paromomycin, spectinomycin,      Vancomycin  o          Macrolides- Azithromycin (Z-pack), Erythromycin, Clarithromycin      (Biaxin), Telithromycin   o          Fluoroquinolones Ciprofloxacin (Cipro), Norfloxacin, Levofloxacin (Levaquin)                Anti-malarials- Chloroquine, hydroxychloroquine (Plaquinal)             Anti-Fungals- Voriconazole             Anti-arrhythmics- Quinidine, Procainamide, Etafenone, Peruvoside             Magnesium- Oral tablets, IV magnesium replacement.     3. Use with Caution- may exacerbate weakness in some myasthenics             Antihypertensives  o          Calcium channel blockers- Verapamil, Nifedipine, Felodipine   o          Beta blockers- Propanalol, Atenolol, Acebutolol, Practolol, Oxprenolol, Sotalol,   Nadolol, and Ophthalmic Timolol             Lithium      Myasthenia Gravis History:    MG Type: Generalized, seronegative  Onset Date: 2008 or 2009  Onset Symptoms: intermittent generalized weakness, fatigability, intermittent double vision.  Immunosuppressant  Medications: methotrexate 20 mg weekly, IVIG every 3 weeks, prednisone 10 mg daily  Pyridostigmine: 60 mg every 3.5 to 4 hours with 2 tablets at night time, and sometimes wakes up and takes an extra one; was taking Mestinon 180 ER at night time only, however on back order  IVIG: yes  MG Crises: Yes  MG Exacerbations: Yes  Hospitalizations for MG: Yes  CT Scan: history of negative CT Chest  Comorbidities including malignancy and diabetes history: history of anorexia  Single Fiber: No  Rep Stim:  No  Labs: routine labs done  Failed Medications: azathioprine, cyclosporine--both had side effects  Other medications: Copay for subcutaneous Ig high and thus not feasible to take, also during brief trial had infusion site skin changes  Current symptoms: double vision, fatigue, extremity weakness, dyspnea on exertion, dysphagia.    Has pain in the lower back area and pain in the left leg; and getting stabbing pains on the top of the dorsum of the food. She will take 800 mg motrin, back brace, aspercreme, lidocaine patches.     She has gone back to walking.      Ptosis: Mild, not typically  Diplopia: Yes, blurred vision, has trouble reading, she has side by side double vision. It is constant  Hypernasal Speech: No  Dysarthria: Sometimes, she will stop and takes a brief rest before it gets better  Hoarseness: Mild  Hypophonia: Her partner says this happens  Chewing Weakness/Fatigue: No  Dysphagia/choking: Little bit of coughing with foods; occasional chocking episodes.   Arm weakness: Tore her right biceps in Sept 2024 and still will ache; otherwise she can do the things she needs to do  Leg Weakness:   Dyspnea: On exertion, with an increased level of physical activity (brisk walking, working in the yard  Head drop/neck soreness: No  Tongue weakness: No     SubQ immunoglobulin was tried in the past, however the co-pay was higher than IVIG, so she was switched back to IVIG.       Review of Systems:  The systems were reviewed  with pertinent positives and negatives documented in the HPI.     Interval History:   Tolerated the infusions for IVIG well, no infusion reactions. Last infusion had leg pain at the end.    Double vision remains a daily issue.  She wears corrective glasses.  She saw Dr. Riaz Neil in April 2024.     Fatigue remains a daily issue for her as well, and she finds himself often needing to take naps in the afternoon.  Her significant other tells her she has been harder to awaken from sleep than before.     She becomes short of breath with any increased levels of physical activity, brisk walking, working in the yard.      She continues working as a  difficult for her medical expenses, 5 days a week, 23 hours total /week.      She reports some acute back pain she has been dealing with, as well as a episode this past weekend of feeling disconnected/disoriented and mouth twisting/distorted in a strange position.  -------  She was unable to stop by the lab for the LRP 4, iron panel, and thyroid function studies ordered last visit.     Her most recent labs from [facility] home infusion in MAY 2024 showed hemoglobin of 9.5. Which is decreased from 10.5 from FEB 2024.   -------  Medications:  - Methotrexate, 20 mg weekly.  Recently she stopped the medication for 2 weeks as a trial and found that it improved her symptoms of sore/burning sensation mouth, as well as bitter taste/dysgeusia.  She has resumed it again recently.  - She continues the IVIG every 3 weeks  - She decreased her prednisone form 15mg to 10mg daily out of concern for weight gain  - She takes mestinon 3-4 times daily and takes 180 ER Mestinon at night time only.     Problems Assessed/Relevant:  Problem List Items Addressed This Visit    None    No past medical history on file.  Past Surgical History:   Procedure Laterality Date    CT HEAD ANGIO W AND WO IV CONTRAST  3/17/2023    CT HEAD ANGIO W AND WO IV CONTRAST POR [id number] CT    HYSTERECTOMY  07/26/2017     Hysterectomy    IR CVC TUNNELED  5/5/2020    IR CVC TUNNELED 5/5/2020 [facility] CLINICAL LEGACY    MR HEAD ANGIO WO IV CONTRAST  2/16/2023    MR HEAD ANGIO WO IV CONTRAST POR CALLIE LEVINE NECK ANGIO WO IV CONTRAST  2/16/2023    MR NECK ANGIO WO IV CONTRAST POR CALLIE CALVIN     Family History   Problem Relation Name Age of Onset    Cancer Mother      Cancer Father      Cancer Sister      Cancer Brother       Social History     Tobacco Use    Smoking status: Never     Passive exposure: Never    Smokeless tobacco: Never   Substance Use Topics    Alcohol use: Yes     Comment: socially      No Known Allergies     Medications:    Current Outpatient Medications:     cyanocobalamin (Vitamin B-12) 500 mcg tablet, Take 1 tablet (500 mcg) by mouth once daily., Disp: , Rfl:     DULoxetine (Cymbalta) 60 mg DR capsule, Take 1 capsule (60 mg) by mouth once daily., Disp: , Rfl:     gabapentin (Neurontin) 100 mg capsule, Take 3 capsules (300 mg) by mouth once daily at bedtime., Disp: 270 capsule, Rfl: 3    methotrexate (Trexall) 2.5 mg tablet, Take 1 tablet (2.5 mg total) by mouth 1 (one) time per week.  Follow directions carefully, and ask to explain any part you do not understand. Take exactly as directed 8 tablets once a week, Disp: 32 tablet, Rfl: 6    pantoprazole (ProtoNix) 40 mg EC tablet, Take 1 tablet (40 mg) by mouth once daily., Disp: , Rfl:     predniSONE (Deltasone) 10 mg tablet, Take 1 tablet (10 mg) by mouth once daily., Disp: 90 tablet, Rfl: 3    pyridostigmine (Mestinon) 180 mg ER tablet, Take 1 tablet (180 mg) by mouth once daily at bedtime. (Patient not taking: Reported on 3/26/2025), Disp: 30 tablet, Rfl: 11    pyridostigmine (Mestinon) 60 mg tablet, Take 1 tablet (60 mg) by mouth if needed (Every 4 hours when awake). As needed, Disp: 540 tablet, Rfl: 3    traZODone (Desyrel) 50 mg tablet, TAKE 1/2 TO 1 TABLET BY MOUTH AT BEDTIME, Disp: 30 tablet, Rfl: 6       Physical Exam:     General Physical Exam:  BP  "139/83   Pulse 67   Temp 36 °C (96.8 °F)   Resp 16   Ht 1.575 m (5' 2\")   Wt 58.1 kg (128 lb)   BMI 23.41 kg/m²      She is not in any acute distress.    Musculoskeletal: No scoliosis, lordosis, kyphosis, pes cavus, or hammertoes     Neurological Exam:   Mental status reveals: alert and oriented to person, place, and date. Speech is intact to conversation. Fund of knowledge is normal.     Cranial nerves:  CN 2   Visual fields full to confrontation.   CN 3, 4, 6   Pupils round, 4 mm in diameter, equally reactive to light. Lids symmetric; no ptosis. EOMs normal alignment, full range.   No nystagmus.   CN 5   Facial sensation intact bilaterally.   CN 7   Normal and symmetric facial strength. Nasolabial folds symmetric.   CN 8   Hearing intact to conversation.   CN 9   Palate elevates symmetrically.   CN 11   Normal strength of shoulder shrug and neck turning.   CN 12   Tongue midline, with normal bulk and strength; no fasciculations.     MG Exam:  Single breath test was *** seconds  No dysarthria, hypophonia, or nasal speech   Sustained upgaze was normal   No ptosis  No peek sign or Cogan's lid twitch   No fatigable weakness      Motor:  Muscle bulk and tone are normal. There are no scapular winging, fasciculations, tremor or other abnormal movement.    Neck flexion and neck extension are normal (MRC 5/5).    MANUAL MUSCLE TESTING IS AS FOLLOWS:    R L      5 5 Shoulder abduction   5 5 Elbow flexion   5 5 Elbow extension   5 5 Wrist flexion   5 5 Wrist extension   5 5 Finger flexion   5 5 Finger extension   5 5 Finger abduction   5 5 Thumb distal flexion   5 5 Thumb abduction     5 5 Hip flexion   5 5 Hip adduction   5 5 Hip abduction   5 5 Knee flexion   5 5 Knee extension   5 5 Ankle dorsiflexion   5 5 Ankle plantarflexion   5 5 Eversion   5 5 Inversion   5 5 Big toe extension   5 5 Toe flexion     Reflexes:   R L    2 2 Biceps    2 2 Brachioradialis    2 2 Triceps    2 2 Patellar   2 2 Achilles     Plantars: " "toes downgoing to plantar stimulation. No clonus or other pathologic reflexes present.      Sensory:   Pinprick sensation and touch sensation are normal and symmetrical.  Position senses are normal at the toes.  Vibration senses are normal at the toes and ankles.  All sensory modalities were normal in the hands and upper extremities.       Coordination:  Finger-nose-finger is normal without dysmetria or overshoot and heel-to-shin is normal. Rapid alternating movements in hands and feet are normal.     Gait:  Station is stable with a normal base. Gait is stable with a normal arm swing and speed. There is no ataxia, shuffling, steppage or waddling gait. Tandem gait is intact. Romberg sign is negative.      Results:     The following labs, imaging, and results were personally reviewed and demonstrated:    Labs:  Lab Results   Component Value Date    HGBA1C 5.7 (H) 06/06/2024       Lab Results   Component Value Date    [id number] 717 07/23/2024     VITAMIN D: No components found for: \"D25OHT\"  COPPER: No results found for: \"COPPER\"  ZINC: No components found for: \"ZINCLEVEL\"  B6: [ ]  FOLIC ACID: No components found for: \"FOLATELEVEL\"  IRON STUDIES:   Lab Results   Component Value Date    IRON 21 (L) 07/23/2024    TIBC 395 07/23/2024    FERRITIN 22 07/23/2024     MAGNESIUM: No components found for: \"MAGNESIUM\"  No components found for: \"THRYOIDSTIMU\"    Lab Results   Component Value Date    NAINA Positive (A) 07/23/2024    ANATITER 1:160 07/23/2024     No results found for: \"CKTOTAL\"    Lab Results   Component Value Date    SPEP Increase in polyclonal gamma globulins.   07/23/2024     CBC:   Lab Results   Component Value Date    WBC 9.6 07/23/2024    HGB 10.6 (L) 07/23/2024    HCT 34.1 (L) 07/23/2024     07/23/2024     BMP:   Lab Results   Component Value Date     07/23/2024    K 3.2 (L) 07/23/2024     07/23/2024    CO2 28 07/23/2024    BUN 19 07/23/2024    CREATININE 0.78 07/23/2024    CALCIUM 9.0 " "07/23/2024    MG 2.26 06/12/2024    PHOS 4.5 06/13/2024     LFT:   Lab Results   Component Value Date    ALKPHOS 71 07/23/2024    BILITOT 0.5 07/23/2024    BILIDIR 0.1 06/05/2024    PROT 8.3 (H) 07/23/2024    PROT 7.9 07/23/2024    ALBUMIN 3.8 07/23/2024    ALT 15 07/23/2024    AST 23 07/23/2024       Imaging:  {Imaging Results (Optional):57678}    EMG Results:  No EMG results found for the past 12 months      Pulmonary Functions Testing Results:  No results found for: \"FEV1\", \"FVC\", \"IZA1HNU\", \"TLC\", \"DLCO\"    Pathology:  {Surgical Pathology (Optional):86551}    Scales and Scores:  {Scales and Scores (Optional):65538}      Impression/Plan:     Impression:  Page Huston is a 70 y.o. who presents with [ ].    Plan:  [ ]    {MGBLOODWORK:77744}    {MGSTARTBACTRIM:98224}    {MGDEXASCAN:40064}    {MGDERM:83517}    {MGVITDCA:50486}    {MGLIVENONLIVEVACCINE:43750}    {MGFOLLOWUP:50178}    No orders of the defined types were placed in this encounter.       Myasthenia Gravis MEDS TO AVOID:  You have Myasthenia gravis and below are the medications that should not be used (contraindicated).     1. Absolute contraindications (are life-threatening)   Curare    D-penicillamine   Botulinum toxin- Botox   Interferon alpha  *               Neuromuscular paralytic agents used in general anesthesia such as succinycholine, rocuronium, vecuronium, etc.  2. Contraindications (should be avoided)   Antibiotics-  o Aminoglycosides- Gentamycin, Kanamycin, Amikacin, Neomycin, Streptomycin,      Tobramycin, Netilmycin, Paromomycin, spectinomycin,      Vancomycin  o Macrolides- Azithromycin (Z-pack), Erythromycin, Clarithromycin      (Biaxin), Telithromycin   o Fluoroquinolones Ciprofloxacin (Cipro), Norfloxacin, Levofloxacin (Levaquin)      Anti-malarials- Chloroquine, hydroxychloroquine (Plaquinal)   Anti-Fungals- Voriconazole   Anti-arrhythmics- Quinidine, Procainamide, Etafenone, Peruvoside   Magnesium- Oral tablets, IV magnesium " replacement.     3. Use with Caution- may exacerbate weakness in some myasthenics   Antihypertensives  o Calcium channel blockers- Verapamil, Nifedipine, Felodipine   o Beta blockers- Propanalol, Atenolol, Acebutolol, Practolol, Oxprenolol, Sotalol,   Nadolol, and Ophthalmic Timolol   Lithium      Reviewed and approved by TAMY RIVERA on 3/26/25 at 11:40 AM.    I personally spent [ ] minutes on the day of the visit completing the review of the medical record and outside records, obtaining history and performing an appropriate physical exam, patient care, counseling and education, placing orders, independently reviewing results, communicating with the patient/family and other providers, coordinating care and performing appropriate clinical documentation.

## 2025-07-07 DIAGNOSIS — G47.00 INSOMNIA, UNSPECIFIED TYPE: ICD-10-CM

## 2025-07-07 DIAGNOSIS — G70.00 MYASTHENIA GRAVIS: ICD-10-CM

## 2025-07-07 DIAGNOSIS — F41.9 ANXIETY: ICD-10-CM

## 2025-07-09 DIAGNOSIS — F41.9 ANXIETY: ICD-10-CM

## 2025-07-09 DIAGNOSIS — G70.00 MYASTHENIA GRAVIS: ICD-10-CM

## 2025-07-09 DIAGNOSIS — G47.00 INSOMNIA, UNSPECIFIED TYPE: ICD-10-CM

## 2025-07-09 RX ORDER — TRAZODONE HYDROCHLORIDE 50 MG/1
50 TABLET ORAL NIGHTLY
Qty: 30 TABLET | Refills: 1 | Status: SHIPPED | OUTPATIENT
Start: 2025-07-09

## 2025-07-09 RX ORDER — PREDNISONE 5 MG/1
15 TABLET ORAL DAILY
Qty: 270 TABLET | Refills: 3 | Status: SHIPPED | OUTPATIENT
Start: 2025-07-09

## 2025-07-30 ENCOUNTER — OFFICE VISIT (OUTPATIENT)
Dept: NEUROLOGY | Facility: HOSPITAL | Age: 70
End: 2025-07-30
Payer: COMMERCIAL

## 2025-07-30 VITALS
DIASTOLIC BLOOD PRESSURE: 93 MMHG | TEMPERATURE: 96.8 F | RESPIRATION RATE: 18 BRPM | HEART RATE: 75 BPM | BODY MASS INDEX: 23.55 KG/M2 | SYSTOLIC BLOOD PRESSURE: 143 MMHG | HEIGHT: 62 IN | WEIGHT: 128 LBS

## 2025-07-30 DIAGNOSIS — G70.00 MYASTHENIA GRAVIS: Primary | ICD-10-CM

## 2025-07-30 DIAGNOSIS — D53.9 MACROCYTIC ANEMIA: ICD-10-CM

## 2025-07-30 DIAGNOSIS — Z86.79: ICD-10-CM

## 2025-07-30 DIAGNOSIS — E61.1 LOW IRON: ICD-10-CM

## 2025-07-30 DIAGNOSIS — K21.9 GERD WITHOUT ESOPHAGITIS: ICD-10-CM

## 2025-07-30 PROCEDURE — 99417 PROLNG OP E/M EACH 15 MIN: CPT | Performed by: PSYCHIATRY & NEUROLOGY

## 2025-07-30 PROCEDURE — 3008F BODY MASS INDEX DOCD: CPT | Performed by: PSYCHIATRY & NEUROLOGY

## 2025-07-30 PROCEDURE — 1125F AMNT PAIN NOTED PAIN PRSNT: CPT | Performed by: PSYCHIATRY & NEUROLOGY

## 2025-07-30 PROCEDURE — 99215 OFFICE O/P EST HI 40 MIN: CPT | Performed by: PSYCHIATRY & NEUROLOGY

## 2025-07-30 PROCEDURE — 1159F MED LIST DOCD IN RCRD: CPT | Performed by: PSYCHIATRY & NEUROLOGY

## 2025-07-30 PROCEDURE — 99215 OFFICE O/P EST HI 40 MIN: CPT | Mod: GC | Performed by: PSYCHIATRY & NEUROLOGY

## 2025-07-30 PROCEDURE — G2211 COMPLEX E/M VISIT ADD ON: HCPCS | Performed by: PSYCHIATRY & NEUROLOGY

## 2025-07-30 RX ORDER — OMEPRAZOLE 40 MG/1
40 CAPSULE, DELAYED RELEASE ORAL
Qty: 30 CAPSULE | Refills: 11 | Status: SHIPPED | OUTPATIENT
Start: 2025-07-30 | End: 2026-07-30

## 2025-07-30 ASSESSMENT — PAIN SCALES - GENERAL: PAINLEVEL_OUTOF10: 6

## 2025-07-30 NOTE — PROGRESS NOTES
Date of Service: 7/30/2025  Patient: Page Huston  MRN: [id number]  Referring Provider: No ref. provider found  Primary Care Physician: Annamarie Dickinson MD       Impression/Plan:     Impression:  Page Huston is a 69 y.o. who presents for follow-up of seronegative myasthenia gravis. Has not tolerated azathioprine in the past, now on methotrexate 20 mg weekly with continued bothersome symptoms of constant double vision, daily fatigue, dyspnea with exertion, extremity weakness and some hoarseness of her voice affecting her quality of life.     Current overall regimen of IVIG 1.5 gram/kg every 3 weeks, methotrexate, and prednisone 10-15 mg daily, mestinon 60 mg QID.     Cannot increase prednisone; concern for weight gain, has a history of eating disorder. She is currently on prednisone to 10 mg daily. Her diplopia, dysphagia are symptoms she is also currently experiencing.      She has failed high dose IVIG at 1.5 grams/kg every 3 weeks. We cannot increase prednisone due to side effects. She has not tolerated Imuran or cyclosporine in the past. Her myasthenia gravis has not been controlled with methotrexate which she is currently on and she is experiencing side effects of a change in taste and mouth pain with it.      Thus, she has tried 3 traditional immunosuppressants--Imuran, Cyclosporine and Methotrexate and either experienced severe side effects or on methotrexate currently--has not been controlled in terms of myasthenia gravis. The copay for Subcutaneous Ig was too high and not feasible. Previously, she got approval for V[employer] for seronegative myasthenia gravis, however copay was also high. We will re-summit an appeal form to see if she will qualify for co-pay assistance. Alternatively, she can not have assistance with Vyvgart we may consider Rystiggo, decreasing the frequency of her IVIg doses, or CellCept.    Pain is not related to Myastenia gravis. The patient may have an underlying rheumatologic disorder,  separate from myasthenia gravis. We will refer the patient to rheumatology. Ultimately, we are planning to wean her off Methotrexate if possible.      Plan:  --Will send for the following labs: serum CPK, B12, MMA, Homocystine, Folate, Iron studies, ferretin, CBC, and CMP.   --Sent referral to Dr Monge (rheumatology)   -- For now, she will continue methotrexate 20 mg weekly, prednisone 10-15mg, folate and B12 supplementation, IVIG every 3 weeks 1.5 gram/kg  -- Pending approval for co-pay assistance with [employer].   -- Discussed applying for long term disability with the patient, she will look into it more and will provide us the necessary paperwork for us to fill on our end.   --She will let us know if she will need plasmapheresis     She will follow up with Neurology on 09/30    Brent Palafox DO   Neuromuscular fellow     Elsy Shipley MD  Neuromuscular Neurology  [facility]  Office Phone Number: [phone]     Myasthenia Gravis MEDS TO AVOID:  You have Myasthenia gravis and below are the medications that should not be used (contraindicated).     1. Absolute contraindications (are life-threatening)   Curare    D-penicillamine   Botulinum toxin- Botox   Interferon alpha  *               Neuromuscular paralytic agents used in general anesthesia such as succinycholine, rocuronium, vecuronium, etc.  2. Contraindications (should be avoided)   Antibiotics-  o Aminoglycosides- Gentamycin, Kanamycin, Amikacin, Neomycin, Streptomycin,      Tobramycin, Netilmycin, Paromomycin, spectinomycin,      Vancomycin  o Macrolides- Azithromycin (Z-pack), Erythromycin, Clarithromycin      (Biaxin), Telithromycin   o Fluoroquinolones Ciprofloxacin (Cipro), Norfloxacin, Levofloxacin (Levaquin)      Anti-malarials- Chloroquine, hydroxychloroquine (Plaquinal)   Anti-Fungals- Voriconazole   Anti-arrhythmics- Quinidine, Procainamide, Etafenone, Peruvoside   Magnesium- Oral tablets, IV magnesium replacement.     3.  Use with Caution- may exacerbate weakness in some myasthenics   Antihypertensives  o Calcium channel blockers- Verapamil, Nifedipine, Felodipine   o Beta blockers- Propanalol, Atenolol, Acebutolol, Practolol, Oxprenolol, Sotalol,   Nadolol, and Ophthalmic Timolol   Lithium      History of Present Illness:    Ms. Huston is a 70 y.o. woman who presents for the management of seronegative Myastenia Gravis.     Since we last saw the patient, she reports worsening generalized weakness, joint pains and fatigue. She believes some of this is in relation to recent hot summer days. She visited [place] for ten days in May, during that time, she briefly held  her home dose of methotrexate so that she could better taste food, but she re-started it soon after returning the [place]. She is currently on IVIG 1.5 gram/kg every 3 weeks, methotrexate, and prednisone 10-15 mg daily, mestinon 60 mg QID. Due to to some of her current symptoms, Ms Huston has needed to take higher doses of steroids (15-20mg). She expressed concerns about returning to the hospital for plasma exchange, as she ended up with a large hospital bill from her admission in 2024. She is open to trial with CellCept.        Of note, the patient reports that when she last saw her hematologist (Phil Felix), he was concerned some of her recent blood work showed indications of a possible rheumatologic disorder.     Lastly, the patient also reports symptoms of nighttime gastric reflux.    Myasthenia Gravis History:    MG Type: Generalized, seronegative  Onset Date: 2008 or 2009  Onset Symptoms: intermittent generalized weakness, fatigability, intermittent double vision.  Immunosuppressant Medications: methotrexate 20 mg weekly, IVIG every 3 weeks, prednisone 10 mg daily  Pyridostigmine: 60 mg every 3.5 to 4 hours with 2 tablets at night time, and sometimes wakes up and takes an extra one; was taking Mestinon 180 ER at night time only, however on back order  IVIG:  yes  MG Crises: Yes  MG Exacerbations: Yes  Hospitalizations for MG: Yes  CT Scan: history of negative CT Chest  Comorbidities including malignancy and diabetes history: history of anorexia  Single Fiber: No  Rep Stim:  No  Labs: routine labs done  Failed Medications: azathioprine, cyclosporine--both had side effects  Other medications: Copay for subcutaneous Ig high and thus not feasible to take, also during brief trial had infusion site skin changes  Current symptoms: double vision, fatigue, extremity weakness, dyspnea on exertion, dysphagia.     Ptosis: Mild, not typically  Diplopia: Yes, blurred vision, has trouble reading, she has side by side double vision. It is constant  Hypernasal Speech: No  Dysarthria: Sometimes, she will stop and takes a brief rest before it gets better  Hoarseness: Mild  Hypophonia: Her partner says this happens  Chewing Weakness/Fatigue: No  Dysphagia/choking: Little bit of coughing with foods; occasional chocking episodes.   Arm weakness: Tore her right biceps in Sept 2024 and still will ache; otherwise she can do the things she needs to do  Leg Weakness:   Dyspnea: On exertion, with an increased level of physical activity (brisk walking, working in the yard  Head drop/neck soreness: No  Tongue weakness: No     SubQ immunoglobulin was tried in the past, however the co-pay was higher than IVIG, so she was switched back to IVIG.     Review of Systems:  The systems were reviewed with pertinent positives and negatives documented in the HPI.     Problems Assessed/Relevant:  Problem List Items Addressed This Visit    None  Visit Diagnoses         Myasthenia gravis    -  Primary    Relevant Orders    Creatine Kinase    Ferritin    Iron and TIBC    Vitamin B12    Folate    CBC and Auto Differential    Comprehensive Metabolic Panel    Sedimentation Rate    C-Reactive Protein    Homocysteine, serum    Methylmalonic Acid    Referral to Rheumatology      GERD without esophagitis        Relevant  "Medications    omeprazole (PriLOSEC) 40 mg DR capsule      Macrocytic anemia        Relevant Orders    Ferritin      Resolved mild anemia        Relevant Orders    Homocysteine, serum      Low iron        Relevant Orders    Iron and TIBC          Medical History[1]  Surgical History[2]  Family History[3]  Social History     Tobacco Use    Smoking status: Never     Passive exposure: Never    Smokeless tobacco: Never   Substance Use Topics    Alcohol use: Yes     Alcohol/week: 2.0 standard drinks of alcohol     Types: 2 Glasses of wine per week     Comment: socially      RX Allergies[4]     Medications:  Current Medications[5]       Physical Exam:     General Physical Exam:  BP (!) 143/93   Pulse 75   Temp 36 °C (96.8 °F) (Temporal)   Resp 18   Ht 1.575 m (5' 2\")   Wt 58.1 kg (128 lb)   BMI 23.41 kg/m²      She is not in any acute distress.     Musculoskeletal: No scoliosis, lordosis, kyphosis, pes cavus, or hammertoes     Neurological Exam:   Mental status reveals: alert and oriented to person, place, and date. Speech is intact to conversation. Fund of knowledge is normal.     Cranial nerves:     CN 7   Normal and symmetric facial strength. Nasolabial folds symmetric. Chronic eyelid ptosis on the left  CN 8   Hearing intact to conversation.   CN 11   Normal strength of shoulder shrug   CN 12   Tongue midline, with normal bulk and strength; no fasciculations.      MG Exam:  Double vision looking in all directions     Motor:  Muscle bulk and tone are normal. There are no fasciculations, tremor or other abnormal movement.     Neck flexion and neck extension are normal (MRC 5/5).     MANUAL MUSCLE TESTING IS AS FOLLOWS:     R          L                            4+        4+          Shoulder abduction       5          5          Elbow flexion     5          5          Elbow extension                  5          5          Finger abduction           5          5          Thumb abduction             4          4    " "      Hip flexion         4+       4+          Knee flexion       4+       4+          Knee extension              5          5          Ankle dorsiflexion          5          5          Ankle plantarflexion            Gait:  The patient needs to hold onto chair handles to stand up. Able to take short steps unassisted. There is no ataxia, shuffling, steppage or waddling gait.     Results:     The following labs, imaging, and results were personally reviewed and demonstrated:    Labs:  Lab Results   Component Value Date    HGBA1C 5.7 (H) 06/06/2024       Lab Results   Component Value Date    CEXZIPGI74 717 07/23/2024     VITAMIN D: No components found for: \"D25OHT\"  COPPER: No results found for: \"COPPER\"  ZINC: No components found for: \"ZINCLEVEL\"  B6: [ ]  FOLIC ACID: No components found for: \"FOLATELEVEL\"  IRON STUDIES:   Lab Results   Component Value Date    IRON 21 (L) 07/23/2024    TIBC 395 07/23/2024    FERRITIN 22 07/23/2024     MAGNESIUM: No components found for: \"MAGNESIUM\"  No components found for: \"THRYOIDSTIMU\"    Lab Results   Component Value Date    NAINA Positive (A) 07/23/2024    ANATITER 1:160 07/23/2024     No results found for: \"CKTOTAL\"    Lab Results   Component Value Date    SPEP Increase in polyclonal gamma globulins.   07/23/2024     CBC:   Lab Results   Component Value Date    WBC 9.6 07/23/2024    HGB 10.6 (L) 07/23/2024    HCT 34.1 (L) 07/23/2024     07/23/2024     BMP:   Lab Results   Component Value Date     07/23/2024    K 3.2 (L) 07/23/2024     07/23/2024    CO2 28 07/23/2024    BUN 19 07/23/2024    CREATININE 0.78 07/23/2024    CALCIUM 9.0 07/23/2024    MG 2.26 06/12/2024    PHOS 4.5 06/13/2024     LFT:   Lab Results   Component Value Date    ALKPHOS 71 07/23/2024    BILITOT 0.5 07/23/2024    BILIDIR 0.1 06/05/2024    PROT 8.3 (H) 07/23/2024    PROT 7.9 07/23/2024    ALBUMIN 3.8 07/23/2024    ALT 15 07/23/2024    AST 23 07/23/2024     Reviewed and approved by " AYLA BETANCOURT on 7/30/25 at 2:20 PM.    I personally spent [ ] minutes on the day of the visit completing the review of the medical record and outside records, obtaining history and performing an appropriate physical exam, patient care, counseling and education, placing orders, independently reviewing results, communicating with the patient/family and other providers, coordinating care and performing appropriate clinical documentation.         [1]   Past Medical History:  Diagnosis Date    Myasthenia gravis 1995    Stroke (Multi)    [2]   Past Surgical History:  Procedure Laterality Date    CT HEAD ANGIO W AND WO IV CONTRAST  3/17/2023    CT HEAD ANGIO W AND WO IV CONTRAST POR [id number] CT    HYSTERECTOMY  07/26/2017    Hysterectomy    IR CVC TUNNELED  5/5/2020    IR CVC TUNNELED 5/5/2020 [facility] CLINICAL LEGACY    MR HEAD ANGIO WO IV CONTRAST  2/16/2023    MR HEAD ANGIO WO IV CONTRAST POR CALLIE MR MOBILE    MR NECK ANGIO WO IV CONTRAST  2/16/2023    MR NECK ANGIO WO IV CONTRAST POR CALLIE MR MOBILE   [3]   Family History  Problem Relation Name Age of Onset    Cancer Mother      Cancer Father      Cancer Sister      Cancer Brother      Cancer Sister More     Cancer Brother Fres    [4] No Known Allergies  [5]   Current Outpatient Medications:     pyridostigmine (Mestinon) 180 mg ER tablet, Take 1 tablet (180 mg) by mouth once daily at bedtime., Disp: 30 tablet, Rfl: 11    cyanocobalamin (Vitamin B-12) 500 mcg tablet, Take 1 tablet (500 mcg) by mouth once daily., Disp: , Rfl:     DULoxetine (Cymbalta) 60 mg DR capsule, Take 1 capsule (60 mg) by mouth once daily., Disp: , Rfl:     gabapentin (Neurontin) 100 mg capsule, Take 3 capsules (300 mg) by mouth once daily at bedtime., Disp: 270 capsule, Rfl: 3    gabapentin (Neurontin) 300 mg capsule, Take 1 capsule (300 mg) by mouth once daily at bedtime., Disp: 90 capsule, Rfl: 3    methotrexate (Trexall) 2.5 mg tablet, Take 1 tablet (2.5 mg total) by mouth 1 (one)  time per week.  Follow directions carefully, and ask to explain any part you do not understand. Take exactly as directed 8 tablets once a week, Disp: 32 tablet, Rfl: 6    omeprazole (PriLOSEC) 40 mg DR capsule, Take 1 capsule (40 mg) by mouth once daily in the morning. Take before meals. Do not crush or chew., Disp: 30 capsule, Rfl: 11    pantoprazole (ProtoNix) 40 mg EC tablet, Take 1 tablet (40 mg) by mouth once daily., Disp: , Rfl:     predniSONE (Deltasone) 5 mg tablet, Take 3 tablets (15 mg) by mouth once daily., Disp: 270 tablet, Rfl: 3    pyridostigmine (Mestinon) 60 mg tablet, Take 1 tablet (60 mg) by mouth if needed (Every 4 hours when awake). As needed, Disp: 540 tablet, Rfl: 3    traZODone (Desyrel) 50 mg tablet, Take 1 tablet (50 mg) by mouth once daily at bedtime., Disp: 30 tablet, Rfl: 1

## 2025-09-10 ENCOUNTER — APPOINTMENT (OUTPATIENT)
Dept: RHEUMATOLOGY | Facility: CLINIC | Age: 70
End: 2025-09-10
Payer: COMMERCIAL